# Patient Record
Sex: FEMALE | Race: WHITE | Employment: OTHER | URBAN - METROPOLITAN AREA
[De-identification: names, ages, dates, MRNs, and addresses within clinical notes are randomized per-mention and may not be internally consistent; named-entity substitution may affect disease eponyms.]

---

## 2017-01-17 ENCOUNTER — TELEPHONE (OUTPATIENT)
Dept: FAMILY MEDICINE CLINIC | Facility: CLINIC | Age: 52
End: 2017-01-17

## 2017-01-17 RX ORDER — PREDNISONE 10 MG/1
TABLET ORAL
Qty: 18 TABLET | Refills: 0 | Status: SHIPPED | OUTPATIENT
Start: 2017-01-17 | End: 2017-02-01

## 2017-01-17 RX ORDER — AMOXICILLIN AND CLAVULANATE POTASSIUM 875; 125 MG/1; MG/1
1 TABLET, FILM COATED ORAL 2 TIMES DAILY
Qty: 20 TABLET | Refills: 0 | Status: SHIPPED | OUTPATIENT
Start: 2017-01-17 | End: 2017-01-27

## 2017-01-17 RX ORDER — ALBUTEROL SULFATE 90 UG/1
2 AEROSOL, METERED RESPIRATORY (INHALATION) EVERY 6 HOURS PRN
Qty: 1 INHALER | Refills: 3 | Status: SHIPPED | OUTPATIENT
Start: 2017-01-17 | End: 2017-02-16

## 2017-01-17 RX ORDER — ALBUTEROL SULFATE 2.5 MG/3ML
2.5 SOLUTION RESPIRATORY (INHALATION) EVERY 6 HOURS PRN
Qty: 75 ML | Refills: 12 | Status: SHIPPED | OUTPATIENT
Start: 2017-01-17 | End: 2018-04-23

## 2017-01-17 NOTE — TELEPHONE ENCOUNTER
Pt states she has had her cough since her last OV on 12/20/16. Wheezing started yesterday. Pt has complaints of cough with yellow/green phlegm and nasal congestion. No fevers. Pt states she is out of medication for her nebulizer and her inhaler.  She would

## 2017-01-19 ENCOUNTER — OFFICE VISIT (OUTPATIENT)
Dept: NEUROLOGY | Facility: CLINIC | Age: 52
End: 2017-01-19

## 2017-01-19 ENCOUNTER — TELEPHONE (OUTPATIENT)
Dept: FAMILY MEDICINE CLINIC | Facility: CLINIC | Age: 52
End: 2017-01-19

## 2017-01-19 VITALS
HEART RATE: 80 BPM | WEIGHT: 220 LBS | RESPIRATION RATE: 16 BRPM | DIASTOLIC BLOOD PRESSURE: 88 MMHG | HEIGHT: 68 IN | BODY MASS INDEX: 33.34 KG/M2 | SYSTOLIC BLOOD PRESSURE: 128 MMHG

## 2017-01-19 DIAGNOSIS — M72.2 PLANTAR FASCIITIS: ICD-10-CM

## 2017-01-19 DIAGNOSIS — M79.671 FOOT PAIN, BILATERAL: ICD-10-CM

## 2017-01-19 DIAGNOSIS — M79.672 FOOT PAIN, BILATERAL: ICD-10-CM

## 2017-01-19 PROCEDURE — 99245 OFF/OP CONSLTJ NEW/EST HI 55: CPT | Performed by: OTHER

## 2017-01-19 NOTE — PATIENT INSTRUCTIONS
Refill policies:    • Allow 2 business days for refills; controlled substances may take longer.   • Contact your pharmacy at least 5 days prior to running out of medication and have them send an electronic request or submit request through the “request re your physician has recommended that you have a procedure or additional testing performed. [facility] will contact your insurance carrier to obtain pre-certification or prior authorization.     Unfortunately, MONICA has seen an increas

## 2017-01-19 NOTE — PROGRESS NOTES
[address] [address]   Neurology- INITIAL CLINIC VISIT  2017, 1:29 PM     Davon [address] Convey Patient Status:  No patient class for patient encounter    1965 MRN [id number]   [facility], [address] [place] [address],  (H)   TSH      0.350-5.500 mIU/mL 1.730    T3 FREE      2.30-4.20 pg/mL 3.22        Past Medical History:  Past Medical History   Diagnosis Date   • PCOS (polycystic ovarian syndrome)    • Fibrocystic breast disease    • Obesity, unspecified    • Fibromyal HYDROcodone-acetaminophen  MG Oral Tab, Take 1 tablet by mouth every 4 (four) hours as needed for Pain., Disp: 60 tablet, Rfl: 0  •  naproxen 500 MG Oral Tab, Take 1 tablet (500 mg total) by mouth Q12H.  Take with a meal, Disp: 60 tablet, Rfl: 0  •  m ability to follow commands were intact. Cranial nerves II-XII: Optic discs were sharp. Pupils were round and reacted to light. Extraocular movements were full. There was no face, jaw, palate or tongue weakness or atrophy. Hearing was grossly intact.  Aj Ano neuropathy. However, symptomatically at this time clinical picture is more consistent with musculoskeletal pain.     Check CK, B12, NUPUR, A1c, B6  Wear shoes with heel insert  Exercising off feet, ie in pool  Rest and icing each night  Stretches  rtc in 4 we

## 2017-01-19 NOTE — TELEPHONE ENCOUNTER
Patient is overdue for labs. Will send patient letter via [employer], will postpone for 1 month.

## 2017-01-19 NOTE — PROGRESS NOTES
When bears weight experiences constant shooting/throbbing pain that starts in feet and moves up legs approximately 1 year. Also is \"falling a lot\" over past year also.

## 2017-01-20 ENCOUNTER — TELEPHONE (OUTPATIENT)
Dept: SURGERY | Facility: CLINIC | Age: 52
End: 2017-01-20

## 2017-01-20 ENCOUNTER — OFFICE VISIT (OUTPATIENT)
Dept: SURGERY | Facility: CLINIC | Age: 52
End: 2017-01-20

## 2017-01-20 ENCOUNTER — TELEPHONE (OUTPATIENT)
Dept: FAMILY MEDICINE CLINIC | Facility: CLINIC | Age: 52
End: 2017-01-20

## 2017-01-20 VITALS — RESPIRATION RATE: 14 BRPM | SYSTOLIC BLOOD PRESSURE: 122 MMHG | HEART RATE: 80 BPM | DIASTOLIC BLOOD PRESSURE: 80 MMHG

## 2017-01-20 DIAGNOSIS — M47.817 LUMBOSACRAL SPONDYLOSIS WITHOUT MYELOPATHY: ICD-10-CM

## 2017-01-20 DIAGNOSIS — G89.29 CHRONIC RIGHT SHOULDER PAIN: ICD-10-CM

## 2017-01-20 DIAGNOSIS — M72.2 PLANTAR FASCIITIS: Primary | ICD-10-CM

## 2017-01-20 DIAGNOSIS — M51.26 BULGING LUMBAR DISC: ICD-10-CM

## 2017-01-20 DIAGNOSIS — M25.511 CHRONIC RIGHT SHOULDER PAIN: ICD-10-CM

## 2017-01-20 PROCEDURE — 99213 OFFICE O/P EST LOW 20 MIN: CPT | Performed by: PHYSICIAN ASSISTANT

## 2017-01-20 RX ORDER — HYDROCODONE BITARTRATE AND ACETAMINOPHEN 10; 325 MG/1; MG/1
1 TABLET ORAL EVERY 8 HOURS PRN
Qty: 90 TABLET | Refills: 0 | Status: SHIPPED | OUTPATIENT
Start: 2017-01-20 | End: 2018-04-23

## 2017-01-20 NOTE — TELEPHONE ENCOUNTER
Spoke w/ pt and informed her that at this time we are unable to schedule for procedures with Dr. Jerris Cranker as Dr. Jerris Cranker is currently not credentialed with [facility].  Informed pt process can take at least 30 days and that pt should call back at a later time for

## 2017-01-20 NOTE — PROGRESS NOTES
Pt is here for follow up regarding back pain, pain currently 5/10, mostly on left lower back to hip, occasionally going into left leg

## 2017-01-20 NOTE — TELEPHONE ENCOUNTER
Called and spoke to patient she states she needs letter with all the diagnosis' that she has and what your treating for rehab services \"to get back on her feet\" she states she has an appt with the lady there at 2pm on Monday and she can pick this letter

## 2017-01-20 NOTE — PROGRESS NOTES
Neurosurgery Follow up      Satya Brandt 113 Samir James is a 48year old right-handed female follow-up. She continues to have low back pain on extension. She continues with right shoulder pain.   She has bilateral foot pain which she BIOPSY          GREGORIA NEEDLE LOCALIZATION W/ SPECIMEN 1 SITE LEFT    1979      Comment  BENIGN      NEEDLE BIOPSY LEFT    2/2010      Comment  BENIGN      OTHER          Comment  left hand, injury        Bilateral  6/9/2016      Comment  Procedure: SYNVISC I dry, without rashes. NEUROLOGICAL:  This patient is alert and orientated x 3.  Speech fluent. Comprehension intact.  Pupils equally round and reactive to light.  3+ brisk bilaterally.  EOMs intact.  Visual fields are full.  Face is symmetrical. Tongue is stenosis    ASSESSMENT:  1.  Balance dysfunction  2.  Frequent falls  3. Cervical spondylosis  4.  Thoracic scoliosis  5.  L5-S1 spondylosis with back pain  6.  Bilateral hip pain  7. Right shoulder pain  8. Plantar fasciitis    PLAN:  1.  Follow up with

## 2017-01-23 ENCOUNTER — TELEPHONE (OUTPATIENT)
Dept: FAMILY MEDICINE CLINIC | Facility: CLINIC | Age: 52
End: 2017-01-23

## 2017-01-23 NOTE — TELEPHONE ENCOUNTER
----- Message from Trish Dai sent at 1/23/2017 12:03 PM CST -----  Contact: Pt  Pt calling to see if ppwk is ready for her. Pt advised she will also need a list of all meds.  Pls call pt @ [phone]

## 2017-01-23 NOTE — TELEPHONE ENCOUNTER
Clarification: 1-2 weeks after finishing antibiotic. Pt notified by phone and verbalized understanding.

## 2017-01-23 NOTE — TELEPHONE ENCOUNTER
Pt states the program helps individuals figure how to go back to school or work after having certain conditions.  She believes that fibromyalgia is an accepted condition but has to bring a letter with all her medical conditions so that she can be accepted i

## 2017-02-01 ENCOUNTER — HOSPITAL ENCOUNTER (OUTPATIENT)
Age: 52
Discharge: HOME | End: 2017-02-01
Attending: FAMILY MEDICINE
Payer: MEDICAID

## 2017-02-01 ENCOUNTER — MED REC SCAN ONLY (OUTPATIENT)
Dept: FAMILY MEDICINE CLINIC | Facility: CLINIC | Age: 52
End: 2017-02-01

## 2017-02-01 ENCOUNTER — APPOINTMENT (OUTPATIENT)
Dept: GENERAL RADIOLOGY | Age: 52
End: 2017-02-01
Attending: FAMILY MEDICINE
Payer: MEDICAID

## 2017-02-01 VITALS
HEART RATE: 85 BPM | DIASTOLIC BLOOD PRESSURE: 81 MMHG | TEMPERATURE: 98 F | WEIGHT: 220 LBS | SYSTOLIC BLOOD PRESSURE: 120 MMHG | RESPIRATION RATE: 16 BRPM | BODY MASS INDEX: 33.34 KG/M2 | HEIGHT: 68 IN

## 2017-02-01 DIAGNOSIS — S93.402A MODERATE ANKLE SPRAIN, LEFT, INITIAL ENCOUNTER: Primary | ICD-10-CM

## 2017-02-01 PROCEDURE — 99203 OFFICE O/P NEW LOW 30 MIN: CPT

## 2017-02-01 PROCEDURE — 73630 X-RAY EXAM OF FOOT: CPT

## 2017-02-01 PROCEDURE — 99213 OFFICE O/P EST LOW 20 MIN: CPT

## 2017-02-01 PROCEDURE — 73610 X-RAY EXAM OF ANKLE: CPT

## 2017-02-01 NOTE — ED INITIAL ASSESSMENT (HPI)
The patient is here with complaints of left foot and ankle pain following a fall down a stair last night about 10pm.  She has swelling to the left ankle and limps with walking due to the pain.   She hasn't taken anything for pain at this time but has used i

## 2017-02-01 NOTE — ED PROVIDER NOTES
Patient presents with:  Lower Extremity Injury (musculoskeletal)      HPI:     Olga Bennett is a 46year old female who presents today with a chief complaint of pain in the left foot and ankle  pain after fall and twisting injury.  Onset of symptom EYES: sclera non icteric bilateral  NECK: supple, no adenopathy,  LUNGS: clear to auscultation, no RRW  CARDIO: RRR without murmur        MDM/Assessment/Plan:   Orders for this encounter:      Orders Placed This Encounter  XR FOOT, COMPLETE (MIN 3 VIEWS) CONCLUSION:  No evidence of acute displaced fracture or dislocation in the left ankle. Mild soft tissue swelling surrounding the ankle joint. Dictated by: Marianne Arenas MD on 2/01/2017 at 10:29     Approved by:  Marianne Arenas MD            Xr Foot, Comp

## 2017-02-03 ENCOUNTER — APPOINTMENT (OUTPATIENT)
Dept: LAB | Facility: HOSPITAL | Age: 52
End: 2017-02-03
Attending: FAMILY MEDICINE
Payer: MEDICAID

## 2017-02-03 DIAGNOSIS — M79.672 FOOT PAIN, BILATERAL: ICD-10-CM

## 2017-02-03 DIAGNOSIS — M79.671 FOOT PAIN, BILATERAL: ICD-10-CM

## 2017-02-03 DIAGNOSIS — N91.2 AMENORRHEA: ICD-10-CM

## 2017-02-03 DIAGNOSIS — G58.9 MONONEUROPATHY: ICD-10-CM

## 2017-02-03 DIAGNOSIS — E06.3 HASHIMOTO'S THYROIDITIS: ICD-10-CM

## 2017-02-03 DIAGNOSIS — E01.0 THYROMEGALY: ICD-10-CM

## 2017-02-03 LAB
ANTI-THYROPEROXIDASE: 100 U/ML (ref ?–60)
EST. AVERAGE GLUCOSE BLD GHB EST-MCNC: 120 MG/DL (ref 68–126)
FREE T4: 1.2 NG/DL (ref 0.9–1.8)
HAV AB SERPL IA-ACNC: 1036 PG/ML (ref 193–986)
HBA1C MFR BLD HPLC: 5.8 % (ref ?–5.7)
T3FREE SERPL-MCNC: 3.28 PG/ML (ref 2.3–4.2)
TSI SER-ACNC: 1.29 MIU/ML (ref 0.35–5.5)

## 2017-02-03 PROCEDURE — 84481 FREE ASSAY (FT-3): CPT

## 2017-02-03 PROCEDURE — 84443 ASSAY THYROID STIM HORMONE: CPT

## 2017-02-03 PROCEDURE — 83036 HEMOGLOBIN GLYCOSYLATED A1C: CPT

## 2017-02-03 PROCEDURE — 86038 ANTINUCLEAR ANTIBODIES: CPT

## 2017-02-03 PROCEDURE — 82552 ASSAY OF CPK IN BLOOD: CPT

## 2017-02-03 PROCEDURE — 86376 MICROSOMAL ANTIBODY EACH: CPT

## 2017-02-03 PROCEDURE — 84207 ASSAY OF VITAMIN B-6: CPT

## 2017-02-03 PROCEDURE — 36415 COLL VENOUS BLD VENIPUNCTURE: CPT

## 2017-02-03 PROCEDURE — 82550 ASSAY OF CK (CPK): CPT

## 2017-02-03 PROCEDURE — 84439 ASSAY OF FREE THYROXINE: CPT

## 2017-02-03 PROCEDURE — 82607 VITAMIN B-12: CPT

## 2017-02-05 LAB
ANA SCREEN: NEGATIVE
VITAMIN B6: 157.5 NMOL/L

## 2017-02-06 ENCOUNTER — TELEPHONE (OUTPATIENT)
Dept: NEUROLOGY | Facility: CLINIC | Age: 52
End: 2017-02-06

## 2017-02-06 ENCOUNTER — TELEPHONE (OUTPATIENT)
Dept: FAMILY MEDICINE CLINIC | Facility: CLINIC | Age: 52
End: 2017-02-06

## 2017-02-06 DIAGNOSIS — E01.0 THYROMEGALY: Primary | ICD-10-CM

## 2017-02-06 LAB
CK TOTAL: 79 U/L
CK-BB: 0 %
CK-MACRO TYPE I: 0 %
CK-MACRO TYPE II: 0 %
CK-MB: 0 %
CK-MM: 100 %

## 2017-02-06 NOTE — TELEPHONE ENCOUNTER
----- Message from Mallory Moffett DO sent at 2/6/2017  1:46 PM CST -----  Can notify Merlyn Corner her labs are looking better, lets keep the thyroid dose the same and recall TSH and free T4 in 1 month

## 2017-02-06 NOTE — TELEPHONE ENCOUNTER
Patient advised. Orders placed.   She will have labs drawn at another [facility]

## 2017-02-06 NOTE — TELEPHONE ENCOUNTER
TPO is an antibody level, so her thyroid is being attacked but the antibodies and as that process resolves her number go down, but her thyroid is slowly burning itself out, Sometimes she may regain thyroid function, but for now the levothyroxine is helping

## 2017-02-06 NOTE — TELEPHONE ENCOUNTER
Patient advised of lab results.   She's not understanding the reason why her TSH and other levels were normal and her TPO was elevated

## 2017-02-06 NOTE — TELEPHONE ENCOUNTER
----- Message from Matilda Brandt DO sent at 2/6/2017  9:51 AM CST -----  Vitamin B6 level was elevated. Would stop taking B vitamins at this time as her B12 was >1000k and B6 toxicity can cause a painful neuropathy.   This may be only contributing but

## 2017-02-13 RX ORDER — LEVOTHYROXINE SODIUM 0.03 MG/1
TABLET ORAL
Qty: 30 TABLET | Refills: 0 | Status: SHIPPED | OUTPATIENT
Start: 2017-02-13 | End: 2017-02-13

## 2017-02-13 RX ORDER — LEVOTHYROXINE SODIUM 0.03 MG/1
TABLET ORAL
Qty: 30 TABLET | Refills: 1 | Status: SHIPPED | OUTPATIENT
Start: 2017-02-13 | End: 2017-05-13

## 2017-02-13 NOTE — TELEPHONE ENCOUNTER
Last OV 12/20/16, Future Appointments  Date Time Provider Karly Recinos   2/23/2017 2:00 PM Jeanmarie Jackson, [address] [facility] [place]   3/3/2017 8:30 AM MD HERNANDO BhaktaPER2 [facility] [place]       Last rx given 12/20/16  Last TSH done 2/3/17 wa

## 2017-03-02 ENCOUNTER — TELEPHONE (OUTPATIENT)
Dept: SURGERY | Facility: CLINIC | Age: 52
End: 2017-03-02

## 2017-03-06 ENCOUNTER — TELEPHONE (OUTPATIENT)
Dept: NEUROLOGY | Facility: CLINIC | Age: 52
End: 2017-03-06

## 2017-03-06 DIAGNOSIS — M47.816 LUMBAR FACET ARTHROPATHY: Primary | ICD-10-CM

## 2017-03-06 DIAGNOSIS — M25.519 SHOULDER PAIN, UNSPECIFIED CHRONICITY, UNSPECIFIED LATERALITY: ICD-10-CM

## 2017-03-06 NOTE — TELEPHONE ENCOUNTER
Spoke with pt who would like to scheduled injections, informed her that prior authorization will be started. Pt verbalized understanding, no further needs.      Patient has [employer], prior authorization is needed for    Right shoulder joint injection  Lef

## 2017-03-07 ENCOUNTER — OFFICE VISIT (OUTPATIENT)
Dept: NEUROLOGY | Facility: CLINIC | Age: 52
End: 2017-03-07

## 2017-03-07 VITALS
SYSTOLIC BLOOD PRESSURE: 122 MMHG | DIASTOLIC BLOOD PRESSURE: 76 MMHG | BODY MASS INDEX: 34 KG/M2 | HEART RATE: 72 BPM | WEIGHT: 222 LBS | RESPIRATION RATE: 16 BRPM

## 2017-03-07 DIAGNOSIS — M72.2 PLANTAR FASCIITIS: Primary | ICD-10-CM

## 2017-03-07 PROCEDURE — 99214 OFFICE O/P EST MOD 30 MIN: CPT | Performed by: OTHER

## 2017-03-07 NOTE — PROGRESS NOTES
[facility] in [place]  Neurology - Clinic Follow up  3/7/2017, 8:40 AM     [facility] Patient Status:  No patient class for patient encounter    1965 MRN [id number]   Location [unfilled] PCP DO Ren Raphael last encounter    ALLERGIES:   Terbinafine Hcl             MEDICATIONS: EMR reviewed   Current outpatient prescriptions:   •  Levothyroxine Sodium 25 MCG Oral Tab, TAKE 1 TABLET(25 MCG) BY MOUTH BEFORE BREAKFAST, Disp: 30 tablet, Rfl: 1  •  HYDROcodone-ace in no acute distress,   Pink Conjunctiva;   Neck: supple, no bruits;  Cardiac: S1/S2 RRR  Lungs: CTA B/L;  Musculoskeletal: no joint tenderness, others see neuro exam;  Extremities:  no pitting edema, no cyanosis or skin rash,  pulse is present,  Neurologic patient was given ample opportunity to ask questions. All questions and concerns were addressed.      Yasmani Hogan,   Neurology and Neuromuscular medicine  [facility]

## 2017-03-07 NOTE — PATIENT INSTRUCTIONS
Refill policies:    • Allow 2 business days for refills; controlled substances may take longer.   • Contact your pharmacy at least 5 days prior to running out of medication and have them send an electronic request or submit request through the “request re your physician has recommended that you have a procedure or additional testing performed. [facility] will contact your insurance carrier to obtain pre-certification or prior authorization.     Unfortunately, MONICA has seen an increas

## 2017-03-13 NOTE — TELEPHONE ENCOUNTER
Received a fax from [address] [place] [address], [phone] has been denied    The clinical information provided was reviewed by our medial director and the request for Facet Joint Interventions are denied based on lack of medical necessity.  Facet joint medial [facility]

## 2017-03-15 NOTE — TELEPHONE ENCOUNTER
Spoke with patient and informed patient of message below. Informed pt PT referral being mailed to pt's home. Address confirmed. Pt verbalized understanding. No further questions at this time.

## 2017-03-15 NOTE — TELEPHONE ENCOUNTER
PT order entered, she needs to do it for 6 weeks, I ordered 3x per week. She should follow up with us in the office afterward so we can document her progress and see if the insurance will approve injections, if needed, at that point.

## 2017-03-21 ENCOUNTER — APPOINTMENT (OUTPATIENT)
Dept: LAB | Facility: HOSPITAL | Age: 52
End: 2017-03-21
Attending: FAMILY MEDICINE
Payer: MEDICAID

## 2017-03-21 ENCOUNTER — TELEPHONE (OUTPATIENT)
Dept: FAMILY MEDICINE CLINIC | Facility: CLINIC | Age: 52
End: 2017-03-21

## 2017-03-21 DIAGNOSIS — E01.0 THYROMEGALY: Primary | ICD-10-CM

## 2017-03-21 DIAGNOSIS — E01.0 THYROMEGALY: ICD-10-CM

## 2017-03-21 LAB
FREE T4: 1.3 NG/DL (ref 0.9–1.8)
TSI SER-ACNC: 1.27 MIU/ML (ref 0.35–5.5)

## 2017-03-21 PROCEDURE — 84443 ASSAY THYROID STIM HORMONE: CPT

## 2017-03-21 PROCEDURE — 84439 ASSAY OF FREE THYROXINE: CPT

## 2017-03-21 PROCEDURE — 36415 COLL VENOUS BLD VENIPUNCTURE: CPT

## 2017-03-21 NOTE — TELEPHONE ENCOUNTER
Her thyroid is normal in function now don;t follow antibody levels we follow her progress  And her TSh, if she'd like she could see if she can find an endocrinologists, with Jett Valdes or [facility]?

## 2017-03-21 NOTE — TELEPHONE ENCOUNTER
----- Message from Agnieszka Saenz DO sent at 3/21/2017  3:02 PM CDT -----  Can notify her Thyroid looks fine, lets keep the dose the same and recall in 6 months THS nand free T4

## 2017-03-21 NOTE — TELEPHONE ENCOUNTER
Patient notified of below. [facility] [phone]. Will call our office to let us know if we can fax info to them.

## 2017-03-21 NOTE — TELEPHONE ENCOUNTER
Left message for patient to call office back. Office phone number provided. Also confirmed that [facility] accepts Medicaid. Their phone # is [phone]. Fax # [phone]  Address [address] [place] [address]

## 2017-03-21 NOTE — TELEPHONE ENCOUNTER
Patient notified of below. Wants to know why she is so tired. Wanted antibody levels checked as they were high last time. Why did TSH and T4 values fluctuate? States is frustrated-hasn't slept in 24 hours. Please advise.

## 2017-03-28 NOTE — TELEPHONE ENCOUNTER
Mail came back with \"Insufficient address\"; called and re-verified address with patient. Address was missing type of street (had [address], should read [address]).  Re-mailed to patient on 03/28 with corrected address, updated pt's demographics to

## 2017-05-15 RX ORDER — LEVOTHYROXINE SODIUM 0.03 MG/1
TABLET ORAL
Qty: 30 TABLET | Refills: 4 | Status: SHIPPED | OUTPATIENT
Start: 2017-05-15 | End: 2017-05-23

## 2017-05-15 NOTE — TELEPHONE ENCOUNTER
Last OV 12/20/16  Last labs 3/21/17  Free T4 0.9-1.8 ng/dL 1.3     TSH 0.350-5.500 mIU/mL 1.270            Last refilled 2/13/17  #30  1 refill

## 2017-05-22 ENCOUNTER — OFFICE VISIT (OUTPATIENT)
Dept: FAMILY MEDICINE CLINIC | Facility: CLINIC | Age: 52
End: 2017-05-22

## 2017-05-22 VITALS
DIASTOLIC BLOOD PRESSURE: 72 MMHG | TEMPERATURE: 99 F | HEART RATE: 96 BPM | SYSTOLIC BLOOD PRESSURE: 110 MMHG | RESPIRATION RATE: 16 BRPM | WEIGHT: 226 LBS | BODY MASS INDEX: 34 KG/M2

## 2017-05-22 DIAGNOSIS — E06.3 HASHIMOTO'S DISEASE: ICD-10-CM

## 2017-05-22 DIAGNOSIS — N91.2 AMENORRHEA: ICD-10-CM

## 2017-05-22 DIAGNOSIS — M79.7 FIBROMYALGIA: ICD-10-CM

## 2017-05-22 DIAGNOSIS — Z11.4 SCREENING FOR HIV (HUMAN IMMUNODEFICIENCY VIRUS): ICD-10-CM

## 2017-05-22 DIAGNOSIS — E04.1 THYROID NODULE: ICD-10-CM

## 2017-05-22 DIAGNOSIS — E01.0 THYROMEGALY: Primary | ICD-10-CM

## 2017-05-22 PROCEDURE — 99214 OFFICE O/P EST MOD 30 MIN: CPT | Performed by: FAMILY MEDICINE

## 2017-05-22 PROCEDURE — 36415 COLL VENOUS BLD VENIPUNCTURE: CPT | Performed by: FAMILY MEDICINE

## 2017-05-22 PROCEDURE — 84481 FREE ASSAY (FT-3): CPT | Performed by: FAMILY MEDICINE

## 2017-05-22 PROCEDURE — 86376 MICROSOMAL ANTIBODY EACH: CPT | Performed by: FAMILY MEDICINE

## 2017-05-22 PROCEDURE — 87389 HIV-1 AG W/HIV-1&-2 AB AG IA: CPT | Performed by: FAMILY MEDICINE

## 2017-05-22 PROCEDURE — 84443 ASSAY THYROID STIM HORMONE: CPT | Performed by: FAMILY MEDICINE

## 2017-05-23 ENCOUNTER — TELEPHONE (OUTPATIENT)
Dept: FAMILY MEDICINE CLINIC | Facility: CLINIC | Age: 52
End: 2017-05-23

## 2017-05-23 DIAGNOSIS — E01.0 THYROMEGALY: Primary | ICD-10-CM

## 2017-05-23 RX ORDER — LEVOTHYROXINE SODIUM 0.07 MG/1
75 TABLET ORAL
Qty: 30 TABLET | Refills: 1 | Status: SHIPPED | OUTPATIENT
Start: 2017-05-23 | End: 2017-07-28

## 2017-05-23 NOTE — TELEPHONE ENCOUNTER
----- Message from Elenita Gramajo DO sent at 5/23/2017  4:50 PM CDT -----  Can notify it is negative

## 2017-05-23 NOTE — TELEPHONE ENCOUNTER
Pt notified by phone and verbalized understanding. Reflex order and pt reminder entered into [employer]. Rx faxed to [facility] in [place].

## 2017-05-23 NOTE — TELEPHONE ENCOUNTER
----- Message from Shirley Del Cid DO sent at 5/23/2017 12:14 PM CDT -----  Can notify Collette Brittonsper that her Thyroid is normal but at the lower end of normal, so lets increase her levothyroxine to 75 mcg and see if that helps, and would get a repeat TSh and T4

## 2017-05-24 ENCOUNTER — TELEPHONE (OUTPATIENT)
Dept: FAMILY MEDICINE CLINIC | Facility: CLINIC | Age: 52
End: 2017-05-24

## 2017-05-24 DIAGNOSIS — Z00.00 ROUTINE HEALTH MAINTENANCE: Primary | ICD-10-CM

## 2017-05-24 NOTE — TELEPHONE ENCOUNTER
Detailed message left for pt on cell (ok per consent) with instructions to call to schedule mammogram.

## 2017-05-25 ENCOUNTER — TELEPHONE (OUTPATIENT)
Dept: FAMILY MEDICINE CLINIC | Facility: CLINIC | Age: 52
End: 2017-05-25

## 2017-05-25 ENCOUNTER — HOSPITAL ENCOUNTER (OUTPATIENT)
Dept: ULTRASOUND IMAGING | Age: 52
Discharge: HOME | End: 2017-05-25
Attending: FAMILY MEDICINE
Payer: MEDICAID

## 2017-05-25 DIAGNOSIS — E04.1 THYROID NODULE: ICD-10-CM

## 2017-05-25 DIAGNOSIS — M79.7 FIBROMYALGIA: ICD-10-CM

## 2017-05-25 DIAGNOSIS — N91.2 AMENORRHEA: ICD-10-CM

## 2017-05-25 DIAGNOSIS — E01.0 THYROMEGALY: ICD-10-CM

## 2017-05-25 DIAGNOSIS — E06.3 HASHIMOTO'S DISEASE: ICD-10-CM

## 2017-05-25 PROCEDURE — 76536 US EXAM OF HEAD AND NECK: CPT | Performed by: FAMILY MEDICINE

## 2017-05-25 NOTE — TELEPHONE ENCOUNTER
Detailed message left for pt on cell (ok per consent) with instructions to call with questions/concerns. Info for Dr. Adolph Urbano and Dr. Jie Chapman left on voice mail.

## 2017-05-25 NOTE — TELEPHONE ENCOUNTER
----- Message from Clark Purdy DO sent at 5/25/2017  2:00 PM CDT -----  Can notify [facility] Bring she has multiple small nodules in her thyroid, if she has [facility] she can continue to see our Docs, so have her set up an appt with Endo, for further evaluati

## 2017-05-30 ENCOUNTER — APPOINTMENT (OUTPATIENT)
Dept: GENERAL RADIOLOGY | Age: 52
End: 2017-05-30
Attending: FAMILY MEDICINE
Payer: MEDICAID

## 2017-05-30 ENCOUNTER — HOSPITAL ENCOUNTER (OUTPATIENT)
Age: 52
Discharge: HOME | End: 2017-05-30
Attending: FAMILY MEDICINE
Payer: MEDICAID

## 2017-05-30 VITALS
SYSTOLIC BLOOD PRESSURE: 131 MMHG | TEMPERATURE: 98 F | RESPIRATION RATE: 16 BRPM | OXYGEN SATURATION: 97 % | BODY MASS INDEX: 33.8 KG/M2 | WEIGHT: 223 LBS | HEIGHT: 68 IN | HEART RATE: 88 BPM | DIASTOLIC BLOOD PRESSURE: 85 MMHG

## 2017-05-30 DIAGNOSIS — S80.01XA CONTUSION OF RIGHT KNEE, INITIAL ENCOUNTER: Primary | ICD-10-CM

## 2017-05-30 PROCEDURE — 99213 OFFICE O/P EST LOW 20 MIN: CPT

## 2017-05-30 PROCEDURE — 73560 X-RAY EXAM OF KNEE 1 OR 2: CPT | Performed by: FAMILY MEDICINE

## 2017-05-30 RX ORDER — NAPROXEN 500 MG/1
500 TABLET ORAL 2 TIMES DAILY PRN
Qty: 28 TABLET | Refills: 0 | Status: SHIPPED | OUTPATIENT
Start: 2017-05-30 | End: 2018-02-02

## 2017-05-31 ENCOUNTER — HOSPITAL ENCOUNTER (OUTPATIENT)
Dept: MAMMOGRAPHY | Age: 52
Discharge: HOME | End: 2017-05-31
Attending: FAMILY MEDICINE
Payer: MEDICAID

## 2017-05-31 DIAGNOSIS — Z00.00 ROUTINE HEALTH MAINTENANCE: ICD-10-CM

## 2017-05-31 PROCEDURE — 77067 SCR MAMMO BI INCL CAD: CPT | Performed by: FAMILY MEDICINE

## 2017-05-31 NOTE — ED PROVIDER NOTES
Patient Seen in: [facility]    History   Patient presents with:  Fall (musculoskeletal, neurologic)  Lower Extremity Injury (musculoskeletal)  Upper Extremity Injury (musculoskeletal)    Stated Complaint: fall, rt side of b nebulization every 6 (six) hours as needed for Wheezing. methocarbamol 500 MG Oral Tab,  Take 1 tablet (500 mg total) by mouth every 8 (eight) hours as needed (for muscle spasm). lidocaine 5 % External Patch,  Place 1 patch onto the skin daily.        Arthor Bernheim examination there is some localized bruising over the lateral part of the thigh without any focal tenderness. Full range of motion of the hip. Patient is able to bear weight on the affected extremity.     ED Course   Labs Reviewed - No data to display  Xr

## 2017-06-19 ENCOUNTER — TELEPHONE (OUTPATIENT)
Dept: SURGERY | Facility: CLINIC | Age: 52
End: 2017-06-19

## 2017-06-19 ENCOUNTER — HOSPITAL ENCOUNTER (EMERGENCY)
Age: 52
Discharge: HOME | End: 2017-06-19
Attending: EMERGENCY MEDICINE
Payer: MEDICAID

## 2017-06-19 ENCOUNTER — APPOINTMENT (OUTPATIENT)
Dept: ULTRASOUND IMAGING | Age: 52
End: 2017-06-19
Attending: EMERGENCY MEDICINE
Payer: MEDICAID

## 2017-06-19 VITALS
BODY MASS INDEX: 32.28 KG/M2 | TEMPERATURE: 98 F | SYSTOLIC BLOOD PRESSURE: 115 MMHG | HEART RATE: 78 BPM | WEIGHT: 213 LBS | DIASTOLIC BLOOD PRESSURE: 68 MMHG | RESPIRATION RATE: 18 BRPM | OXYGEN SATURATION: 99 % | HEIGHT: 68 IN

## 2017-06-19 DIAGNOSIS — R60.9 PERIPHERAL EDEMA: Primary | ICD-10-CM

## 2017-06-19 DIAGNOSIS — M54.31 SCIATICA OF RIGHT SIDE: ICD-10-CM

## 2017-06-19 PROCEDURE — 99284 EMERGENCY DEPT VISIT MOD MDM: CPT

## 2017-06-19 PROCEDURE — 93971 EXTREMITY STUDY: CPT | Performed by: EMERGENCY MEDICINE

## 2017-06-19 RX ORDER — HYDROCODONE BITARTRATE AND ACETAMINOPHEN 5; 325 MG/1; MG/1
1 TABLET ORAL ONCE
Status: COMPLETED | OUTPATIENT
Start: 2017-06-19 | End: 2017-06-19

## 2017-06-19 RX ORDER — HYDROCODONE BITARTRATE AND ACETAMINOPHEN 5; 325 MG/1; MG/1
1-2 TABLET ORAL EVERY 4 HOURS PRN
Qty: 20 TABLET | Refills: 0 | Status: SHIPPED | OUTPATIENT
Start: 2017-06-19 | End: 2017-06-26

## 2017-06-19 RX ORDER — IBUPROFEN 600 MG/1
600 TABLET ORAL ONCE
Status: COMPLETED | OUTPATIENT
Start: 2017-06-19 | End: 2017-06-19

## 2017-06-19 NOTE — ED INITIAL ASSESSMENT (HPI)
RIGHT LEG PAIN FOR PAST MONTH BUT SWELLING OF CALF STARTED ON SAT.   TOOK 2 [address] WITHOUT RELIEF

## 2017-06-19 NOTE — TELEPHONE ENCOUNTER
Pt calling regarding PT order. Pt PT order for evaluation and treatment of low back not completed and now . Pt states she has new pain to leg and foot and scheduling OV for f/u to new pain.   Pt states she will request new PT order to include new ar

## 2017-07-12 ENCOUNTER — OFFICE VISIT (OUTPATIENT)
Dept: SURGERY | Facility: CLINIC | Age: 52
End: 2017-07-12

## 2017-07-12 VITALS
BODY MASS INDEX: 33.04 KG/M2 | DIASTOLIC BLOOD PRESSURE: 76 MMHG | SYSTOLIC BLOOD PRESSURE: 132 MMHG | RESPIRATION RATE: 16 BRPM | HEART RATE: 78 BPM | HEIGHT: 68 IN | WEIGHT: 218 LBS

## 2017-07-12 DIAGNOSIS — M54.16 LUMBAR RADICULITIS: Primary | ICD-10-CM

## 2017-07-12 DIAGNOSIS — M72.2 PLANTAR FASCIITIS: ICD-10-CM

## 2017-07-12 DIAGNOSIS — G89.29 CHRONIC PAIN OF RIGHT KNEE: ICD-10-CM

## 2017-07-12 DIAGNOSIS — M25.561 CHRONIC PAIN OF RIGHT KNEE: ICD-10-CM

## 2017-07-12 PROCEDURE — 99214 OFFICE O/P EST MOD 30 MIN: CPT | Performed by: PHYSICIAN ASSISTANT

## 2017-07-12 NOTE — PATIENT INSTRUCTIONS
Refill policies:    • Allow 2-3 business days for refills; controlled substances may take longer.   • Contact your pharmacy at least 5 days prior to running out of medication and have them send an electronic request or submit request through the [facility] have a procedure or additional testing performed. [facility]) will contact your insurance carrier to obtain pre-certification or prior authorization.     Unfortunately, MONICA has seen an increase in denial of payment even though the p

## 2017-07-12 NOTE — PROGRESS NOTES
Name: Merari Alvarez   : 1965   DOS: 2017     Pain Clinic Follow Up Visit:   Patient presents with:   Follow - Up: shoulder/back/leg pain      Sienna Villatoro Sat is a 46year old female who presents for recheck of chronic low back pa needed. Disp: 90 tablet Rfl: 0   methocarbamol 500 MG Oral Tab Take 1 tablet (500 mg total) by mouth every 8 (eight) hours as needed (for muscle spasm). Disp: 90 tablet Rfl: 0   lidocaine 5 % External Patch Place 1 patch onto the skin daily.  Disp: 30 patch AM

## 2017-07-14 ENCOUNTER — TELEPHONE (OUTPATIENT)
Dept: SURGERY | Facility: CLINIC | Age: 52
End: 2017-07-14

## 2017-07-14 NOTE — TELEPHONE ENCOUNTER
Spoke w/ pt and informed of message below. Pt to call office to provide update after 6 weeks of PT. Pt verbalized understanding. No further needs at this time.       ASSESSMENT AND PLAN:   Lumbar radiculitis  (primary encounter diagnosis)  Chronic pain o

## 2017-07-17 ENCOUNTER — TELEPHONE (OUTPATIENT)
Dept: NEUROLOGY | Facility: CLINIC | Age: 52
End: 2017-07-17

## 2017-07-17 NOTE — TELEPHONE ENCOUNTER
Spoke with patient and informed patient of message below. Pt states she is trying to get into PT and states she has tried medication. Pt verbalized frustration and understanding and states she will call insurance to follow up.   No further questions at [facility]

## 2017-07-17 NOTE — TELEPHONE ENCOUNTER
Received a fax from [facility], MRI Knee/MRI Lumbar has been denied,    An MRI Knee must meet TONY guidelines which require that imaging be necessary on the basis of a medical or physical condition.  Based on information provided (a fall on the knee), the recommend

## 2017-07-24 ENCOUNTER — HOSPITAL ENCOUNTER (OUTPATIENT)
Dept: PHYSICAL THERAPY | Facility: HOSPITAL | Age: 52
Setting detail: THERAPIES SERIES
Discharge: HOME | End: 2017-07-24
Attending: PHYSICIAN ASSISTANT
Payer: COMMERCIAL

## 2017-07-24 ENCOUNTER — TELEPHONE (OUTPATIENT)
Dept: FAMILY MEDICINE CLINIC | Facility: CLINIC | Age: 52
End: 2017-07-24

## 2017-07-24 DIAGNOSIS — M54.16 LUMBAR RADICULITIS: ICD-10-CM

## 2017-07-24 PROCEDURE — 97163 PT EVAL HIGH COMPLEX 45 MIN: CPT

## 2017-07-24 PROCEDURE — 97110 THERAPEUTIC EXERCISES: CPT

## 2017-07-24 NOTE — TELEPHONE ENCOUNTER
Patient is due for repeat labs. Letter mailed to patients home address, as patient does not check mychart account.

## 2017-07-24 NOTE — PROGRESS NOTES
SPINE EVALUATION:   Referring Physician: Dr. Tenna Lombard  Diagnosis: lumbar radiculitis     Date of Service: 7/24/2017     PATIENT SUMMARY   Sienna Gray is a 46year old y/o female who presents to therapy today with complaints of RLE swelling and karyn thyroid problem. ASSESSMENT  Patient presents to PT with impairments of pain, decreased lumbar ROM, decreased strength, gait deviations, and loss of function.   Patient has widespread pain in RLE that is affected with activity and can move around, though pain with going up a flight of stairs to her bedroom. Patient will demonstrate at least 75% reduction in sleep disturbances.   Patient will increase lumbar extension ROM to at least 55 cm in order to improve her ability to perform sit to stand transfer wit

## 2017-07-27 ENCOUNTER — HOSPITAL ENCOUNTER (OUTPATIENT)
Dept: PHYSICAL THERAPY | Facility: HOSPITAL | Age: 52
Setting detail: THERAPIES SERIES
Discharge: HOME | End: 2017-07-27
Attending: PHYSICIAN ASSISTANT
Payer: COMMERCIAL

## 2017-07-27 PROCEDURE — 97110 THERAPEUTIC EXERCISES: CPT

## 2017-07-27 PROCEDURE — 97112 NEUROMUSCULAR REEDUCATION: CPT

## 2017-07-27 PROCEDURE — 97140 MANUAL THERAPY 1/> REGIONS: CPT

## 2017-07-27 NOTE — PROGRESS NOTES
Dx: lumbar radiculitis         Authorized # of Visits:           Next MD visit: none scheduled  Fall Risk: standard         Precautions: n/a             Subjective: Patient reports no change at this time.     Objective:   RLE swelling and pain      Assessme x1, neuro x1       Total Timed Treatment: 45 min  Total Treatment Time: 45 min

## 2017-07-28 DIAGNOSIS — E01.0 THYROMEGALY: ICD-10-CM

## 2017-07-28 RX ORDER — LEVOTHYROXINE SODIUM 0.07 MG/1
TABLET ORAL
Qty: 30 TABLET | Refills: 6 | Status: SHIPPED | OUTPATIENT
Start: 2017-07-28 | End: 2018-05-30 | Stop reason: ALTCHOICE

## 2017-07-31 ENCOUNTER — HOSPITAL ENCOUNTER (OUTPATIENT)
Dept: PHYSICAL THERAPY | Facility: HOSPITAL | Age: 52
Setting detail: THERAPIES SERIES
Discharge: HOME | End: 2017-07-31
Attending: PHYSICIAN ASSISTANT
Payer: COMMERCIAL

## 2017-07-31 PROCEDURE — 97140 MANUAL THERAPY 1/> REGIONS: CPT

## 2017-07-31 PROCEDURE — 97112 NEUROMUSCULAR REEDUCATION: CPT

## 2017-07-31 PROCEDURE — 97110 THERAPEUTIC EXERCISES: CPT

## 2017-07-31 NOTE — PROGRESS NOTES
Dx: lumbar radiculitis         Authorized # of Visits:           Next MD visit: none scheduled  Fall Risk: standard         Precautions: n/a             Subjective: Patient reports no change at this time.     Objective:   RLE swelling and pain    Assessment alarm system x10' total PNE: education regarding sleep  Hygiene, alarm system x10' total         8\" forward lunges x20 BL        Skilled Services: HEP in bold.     Charges: manual x1, therex x1, neuro x1       Total Timed Treatment: 45 min  Total Treatment

## 2017-08-03 ENCOUNTER — HOSPITAL ENCOUNTER (OUTPATIENT)
Dept: PHYSICAL THERAPY | Facility: HOSPITAL | Age: 52
Setting detail: THERAPIES SERIES
Discharge: HOME | End: 2017-08-03
Attending: PHYSICIAN ASSISTANT
Payer: COMMERCIAL

## 2017-08-03 PROCEDURE — 97110 THERAPEUTIC EXERCISES: CPT

## 2017-08-03 NOTE — PROGRESS NOTES
Dx: lumbar radiculitis         Authorized # of Visits:           Next MD visit: none scheduled  Fall Risk: standard         Precautions: n/a             Subjective: Patient reports her heels are killing her today.     Objective:   RLE swelling and pain    A regarding sleep tips, sensitive nervous system, alarm system x10' total PNE: education regarding sleep  Hygiene, alarm system x10' total         8\" forward lunges x20 BL 8\" forward lunges x20 BL         Green tband knee flexion and extension 2x20 BL

## 2017-08-07 ENCOUNTER — HOSPITAL ENCOUNTER (OUTPATIENT)
Dept: PHYSICAL THERAPY | Facility: HOSPITAL | Age: 52
Setting detail: THERAPIES SERIES
Discharge: HOME | End: 2017-08-07
Attending: PHYSICIAN ASSISTANT
Payer: COMMERCIAL

## 2017-08-07 PROCEDURE — 97140 MANUAL THERAPY 1/> REGIONS: CPT

## 2017-08-07 PROCEDURE — 97110 THERAPEUTIC EXERCISES: CPT

## 2017-08-07 NOTE — PROGRESS NOTES
Dx: lumbar radiculitis         Authorized # of Visits:           Next MD visit: none scheduled  Fall Risk: standard         Precautions: n/a             Subjective: Patient 15' late for her appointment. Patient states she is in a lot of pain today.     Obj Standing gastroc stretch with foot on step and heel on floor 4x30 sec BL Standing gastroc stretch with foot on step and heel on floor 4x30 sec BL      PNE: education regarding sleep tips, sensitive nervous system, alarm system x10' total PNE: education reg

## 2017-08-10 ENCOUNTER — HOSPITAL ENCOUNTER (OUTPATIENT)
Dept: PHYSICAL THERAPY | Facility: HOSPITAL | Age: 52
Setting detail: THERAPIES SERIES
Discharge: HOME | End: 2017-08-10
Attending: PHYSICIAN ASSISTANT
Payer: COMMERCIAL

## 2017-08-10 PROCEDURE — 97140 MANUAL THERAPY 1/> REGIONS: CPT

## 2017-08-10 PROCEDURE — 97112 NEUROMUSCULAR REEDUCATION: CPT

## 2017-08-10 PROCEDURE — 97110 THERAPEUTIC EXERCISES: CPT

## 2017-08-14 ENCOUNTER — TELEPHONE (OUTPATIENT)
Dept: SURGERY | Facility: CLINIC | Age: 52
End: 2017-08-14

## 2017-08-14 ENCOUNTER — HOSPITAL ENCOUNTER (OUTPATIENT)
Dept: PHYSICAL THERAPY | Facility: HOSPITAL | Age: 52
Setting detail: THERAPIES SERIES
Discharge: HOME | End: 2017-08-14
Attending: PHYSICIAN ASSISTANT
Payer: COMMERCIAL

## 2017-08-14 PROCEDURE — 97140 MANUAL THERAPY 1/> REGIONS: CPT

## 2017-08-14 PROCEDURE — 97110 THERAPEUTIC EXERCISES: CPT

## 2017-08-14 NOTE — PROGRESS NOTES
Dx: lumbar radiculitis         Authorized # of Visits:           Next MD visit: none scheduled  Fall Risk: standard         Precautions: n/a             Subjective: Patient competed in her triathlon over the weekend and states she felt great.   She used tap DF stretching x15' total    SB ex: supine lower trunk rotations x2', SB DKTC x20 SB ex: supine lower trunk rotations x2', SB DKTC x20  SB ex: supine lower trunk rotations x2', SB DKTC x20 SB ex: supine lower trunk rotations x2'  SB DKTC x20 SB ex: supine l

## 2017-08-15 ENCOUNTER — TELEPHONE (OUTPATIENT)
Dept: SURGERY | Facility: CLINIC | Age: 52
End: 2017-08-15

## 2017-08-15 NOTE — TELEPHONE ENCOUNTER
Spoke to pt regarding office injection. She is stating that she finished her PT as discussed during last OV and she would like to schedule injections. Per last OV notes, only one injection was recommended for pt.  Right plantar fascia injection which is angie

## 2017-08-15 NOTE — TELEPHONE ENCOUNTER
Called pt to get new insurance information from her. She stated that nothing has changed. RN advised her to call our office once she receives information packet from [facility]. Pt verbalizes understanding.       Also, pt notified that once her ne

## 2017-08-15 NOTE — TELEPHONE ENCOUNTER
Per Ky Rand, pt can receive injection on both feet. Pt notified and advised to have a  just in case. Pre-op instruction reviewed with pt. Pt switched her insurance from [facility] to [facility] and is asking if she needs new MRI order.

## 2017-08-17 ENCOUNTER — APPOINTMENT (OUTPATIENT)
Dept: PHYSICAL THERAPY | Facility: HOSPITAL | Age: 52
End: 2017-08-17
Attending: PHYSICIAN ASSISTANT
Payer: COMMERCIAL

## 2017-09-06 NOTE — TELEPHONE ENCOUNTER
Spoke to patient, states she is active with [facility], states member ID remained the same. Patient advised office will obtain prior authorization from new insurance for injection next week. Patient verbalized understanding, no further needs.      Attempted to

## 2017-09-07 NOTE — TELEPHONE ENCOUNTER
Patient provided [facility] member ID: [id number]. Eligibility e-verified.

## 2017-09-07 NOTE — TELEPHONE ENCOUNTER
Spoke to Robb BURKS at [facility] to request authorization for Plantar Fascia injection, provided cpt code 05075.  Robb Murray states Jaz does not review code, no prior authorization required, but advised to call [facility] as authorization may be needed through the

## 2017-09-14 NOTE — PROGRESS NOTES
Name: Pierre Vergara   : 1965   DOS: 2017     Pain Clinic Follow Up Visit:   No chief complaint on file. Pierre Vergara is a 46year old female who presents for recheck of chronic low back pain and knee pain.  I orderd MRI L

## 2017-09-15 ENCOUNTER — OFFICE VISIT (OUTPATIENT)
Dept: SURGERY | Facility: CLINIC | Age: 52
End: 2017-09-15

## 2017-09-15 ENCOUNTER — TELEPHONE (OUTPATIENT)
Dept: SURGERY | Facility: CLINIC | Age: 52
End: 2017-09-15

## 2017-09-15 VITALS
WEIGHT: 210 LBS | BODY MASS INDEX: 31.83 KG/M2 | HEIGHT: 68 IN | RESPIRATION RATE: 16 BRPM | SYSTOLIC BLOOD PRESSURE: 128 MMHG | HEART RATE: 78 BPM | DIASTOLIC BLOOD PRESSURE: 68 MMHG

## 2017-09-15 DIAGNOSIS — G89.29 CHRONIC RIGHT SHOULDER PAIN: ICD-10-CM

## 2017-09-15 DIAGNOSIS — M25.511 CHRONIC RIGHT SHOULDER PAIN: ICD-10-CM

## 2017-09-15 DIAGNOSIS — M72.2 PLANTAR FASCIITIS, BILATERAL: Primary | ICD-10-CM

## 2017-09-15 DIAGNOSIS — M25.561 CHRONIC PAIN OF RIGHT KNEE: ICD-10-CM

## 2017-09-15 DIAGNOSIS — G89.29 CHRONIC PAIN OF RIGHT KNEE: ICD-10-CM

## 2017-09-15 DIAGNOSIS — M54.16 LUMBAR RADICULOPATHY: ICD-10-CM

## 2017-09-15 PROCEDURE — 20552 NJX 1/MLT TRIGGER POINT 1/2: CPT | Performed by: NURSE PRACTITIONER

## 2017-09-15 RX ORDER — LIDOCAINE HYDROCHLORIDE 10 MG/ML
20 INJECTION, SOLUTION INFILTRATION; PERINEURAL ONCE
Status: COMPLETED | OUTPATIENT
Start: 2017-09-15 | End: 2017-09-15

## 2017-09-15 RX ORDER — METHYLPREDNISOLONE ACETATE 40 MG/ML
80 INJECTION, SUSPENSION INTRA-ARTICULAR; INTRALESIONAL; INTRAMUSCULAR; SOFT TISSUE ONCE
Status: COMPLETED | OUTPATIENT
Start: 2017-09-15 | End: 2017-09-15

## 2017-09-15 RX ADMIN — METHYLPREDNISOLONE ACETATE 80 MG: 40 INJECTION, SUSPENSION INTRA-ARTICULAR; INTRALESIONAL; INTRAMUSCULAR; SOFT TISSUE at 09:38:00

## 2017-09-15 RX ADMIN — LIDOCAINE HYDROCHLORIDE 20 ML: 10 INJECTION, SOLUTION INFILTRATION; PERINEURAL at 09:37:00

## 2017-09-15 NOTE — TELEPHONE ENCOUNTER
Which please attempt to pre-certify MRI of lumbar spine and right knee MRI which were ordered in July.   Patient insurance at that time denied MRI because she needed to do physical therapy first.  She did complete physical therapy for 6 weeks with no relief

## 2017-09-15 NOTE — PROGRESS NOTES
Name: Pierre Vergara   : 1965   DOS: 9/15/2017     Pain Clinic Follow Up Visit:   Pierre Vergara is a 46year old female who presents for bilateral plantar fascia injections in the office today.     She is also been experiencing incre lidocaine 5 % External Patch Place 1 patch onto the skin daily. Disp: 30 patch Rfl: 1         EXAM:   /68   Pulse 78   Resp 16   Ht 68\"   Wt 210 lb   BMI 31.93 kg/m²   General: 46year old female in no acute distress.   Neurologic: Alert, oriented, 12:39 PM

## 2017-09-15 NOTE — TELEPHONE ENCOUNTER
Would you please delete the progress note that you had previously opened for this patient today. Since I did the consent, I completed my own progress note.   Thank you

## 2017-09-15 NOTE — PATIENT INSTRUCTIONS
Refill policies:    • Allow 2-3 business days for refills; controlled substances may take longer.   • Contact your pharmacy at least 5 days prior to running out of medication and have them send an electronic request or submit request through the [facility] have a procedure or additional testing performed. [facility]) will contact your insurance carrier to obtain pre-certification or prior authorization.     Unfortunately, MONICA has seen an increase in denial of payment even though the p

## 2017-09-17 NOTE — PROCEDURES
[facility]  Operative Report  9/15/2017     Melissa Lazar Patient Status:  No patient class for patient encounter    1965 MRN [id number]   Location [address] [place] [address], [address] [place] [address], [address] [place] [address] Attending No att. providers found   H

## 2017-09-18 NOTE — TELEPHONE ENCOUNTER
Spoke to Rey matamoros [facility], codes  are valid and billable, no prior authorization or predetermination required.  Call reference: [phone] call time 5:59

## 2017-09-27 ENCOUNTER — APPOINTMENT (OUTPATIENT)
Dept: LAB | Facility: HOSPITAL | Age: 52
End: 2017-09-27
Attending: FAMILY MEDICINE
Payer: MEDICAID

## 2017-09-27 ENCOUNTER — TELEPHONE (OUTPATIENT)
Dept: FAMILY MEDICINE CLINIC | Facility: CLINIC | Age: 52
End: 2017-09-27

## 2017-09-27 DIAGNOSIS — E01.0 THYROMEGALY: Primary | ICD-10-CM

## 2017-09-27 DIAGNOSIS — E01.0 THYROMEGALY: ICD-10-CM

## 2017-09-27 PROCEDURE — 36415 COLL VENOUS BLD VENIPUNCTURE: CPT

## 2017-09-27 PROCEDURE — 84443 ASSAY THYROID STIM HORMONE: CPT

## 2017-09-27 PROCEDURE — 84439 ASSAY OF FREE THYROXINE: CPT

## 2017-09-27 NOTE — TELEPHONE ENCOUNTER
----- Message from Nia Prado MD sent at 9/27/2017 12:58 PM CDT -----  Please let patient know or leave message that her thyroid is well regulated.   Continue present medications  Repeat tsh and free t4 in 6 months  Thanks

## 2017-10-10 ENCOUNTER — HOSPITAL ENCOUNTER (OUTPATIENT)
Dept: MRI IMAGING | Age: 52
Discharge: HOME | End: 2017-10-10
Attending: PHYSICIAN ASSISTANT
Payer: MEDICAID

## 2017-10-10 DIAGNOSIS — M54.16 LUMBAR RADICULITIS: ICD-10-CM

## 2017-10-10 DIAGNOSIS — G89.29 CHRONIC PAIN OF RIGHT KNEE: ICD-10-CM

## 2017-10-10 DIAGNOSIS — M25.561 CHRONIC PAIN OF RIGHT KNEE: ICD-10-CM

## 2017-10-10 PROCEDURE — 72148 MRI LUMBAR SPINE W/O DYE: CPT | Performed by: PHYSICIAN ASSISTANT

## 2017-10-10 PROCEDURE — 73721 MRI JNT OF LWR EXTRE W/O DYE: CPT | Performed by: PHYSICIAN ASSISTANT

## 2017-10-19 PROBLEM — M17.11 PRIMARY OSTEOARTHRITIS OF RIGHT KNEE: Status: ACTIVE | Noted: 2017-10-19

## 2017-10-25 ENCOUNTER — TELEPHONE (OUTPATIENT)
Dept: SURGERY | Facility: CLINIC | Age: 52
End: 2017-10-25

## 2017-10-25 DIAGNOSIS — M25.811 CLICKING RIGHT SHOULDER: ICD-10-CM

## 2017-10-25 DIAGNOSIS — M25.511 CHRONIC RIGHT SHOULDER PAIN: Primary | ICD-10-CM

## 2017-10-25 DIAGNOSIS — G89.29 CHRONIC RIGHT SHOULDER PAIN: Primary | ICD-10-CM

## 2017-10-26 NOTE — TELEPHONE ENCOUNTER
Spoke to pt regarding her right shoulder pain which she states her now is 9/10. Pt would like to get MRI prior to any injections. RN provided her with central scheduling phone number and the order number.  Pt will call our office regarding MRI results and i

## 2017-11-08 ENCOUNTER — HOSPITAL ENCOUNTER (OUTPATIENT)
Dept: GENERAL RADIOLOGY | Facility: HOSPITAL | Age: 52
Discharge: HOME | End: 2017-11-08
Attending: NURSE PRACTITIONER
Payer: MEDICAID

## 2017-11-08 DIAGNOSIS — G89.29 CHRONIC RIGHT SHOULDER PAIN: ICD-10-CM

## 2017-11-08 DIAGNOSIS — M25.511 CHRONIC RIGHT SHOULDER PAIN: ICD-10-CM

## 2017-11-08 PROCEDURE — 73030 X-RAY EXAM OF SHOULDER: CPT | Performed by: NURSE PRACTITIONER

## 2017-11-08 NOTE — TELEPHONE ENCOUNTER
Please let patient know that her insurance would not approve the MRI of the right shoulder. They require that she begins with a right shoulder x-ray which I have ordered.   Thank you

## 2017-11-08 NOTE — TELEPHONE ENCOUNTER
Patient called asking about MRI. I advised her of prior note; that x-ray needed to be done first before insurance would approve MRI and that x-ray order was in the system. She was happy and will schedule x-ray soon.

## 2017-11-08 NOTE — TELEPHONE ENCOUNTER
Denied MRI Right Shoulder cpt code 47511    Based on [facility] Musculoskeletal Imaging Guidelines, we cannot approve this request. Your records show that you have signs and/or symptoms related to the body system that provides form, support, strength, and mot

## 2017-11-09 NOTE — TELEPHONE ENCOUNTER
Called pt and notified her to of the message below. Also, advised her to wait a week prior to scheduling her MRI to make sure that is covered under her insurance. Pt verbalized understanding.

## 2017-11-09 NOTE — TELEPHONE ENCOUNTER
Given patient's continued pain and slopping acromion which can be associated with rotator cuff pathology, I have ordered right shoulder MRI. Would you please ask her to wait a couple of days and be sure it is approved by her insurance prior to scheduling.

## 2017-11-27 NOTE — TELEPHONE ENCOUNTER
Called [employer]. Spoke to appeals rep  [place] advsied rep that per original denial pt has completed Xray of shoulder. Rep states that at this time an appeal has to be submitted in writing along with the completed designation form signed by patient.

## 2017-11-30 NOTE — TELEPHONE ENCOUNTER
Spoke to patient, advised that office is trying to complete appeal for MRI Shoulder, but need signed Delegation Form. Patient requests form be faxed to her attention at [phone]. Form faxed, fax confirmation received.

## 2017-12-06 NOTE — TELEPHONE ENCOUNTER
Spoke to patient, states she did receive fax, but is not able to fax the form back. Patient states she will stop in the office to sign the forms. No further needs at this time.

## 2017-12-13 NOTE — TELEPHONE ENCOUNTER
Appeal letter drafted and submitted to [address] [place] [address] with XR report and clinical notes.

## 2017-12-18 NOTE — TELEPHONE ENCOUNTER
Received fax from [facility] saying that they have received our appeal and it can take up to 30 days for a decision.     Request  ID #[id number]

## 2018-01-03 ENCOUNTER — TELEPHONE (OUTPATIENT)
Dept: FAMILY MEDICINE CLINIC | Facility: CLINIC | Age: 53
End: 2018-01-03

## 2018-01-03 NOTE — TELEPHONE ENCOUNTER
Patient contacted office, states she received letter of authorization for MRI shoulder, office submitted appeal in December. Patient provided authorization #[id number], valid through 1/28/18.  Patient advised to contact central scheduling to proceed with MR

## 2018-01-12 ENCOUNTER — HOSPITAL ENCOUNTER (OUTPATIENT)
Dept: MRI IMAGING | Age: 53
Discharge: HOME | End: 2018-01-12
Attending: NURSE PRACTITIONER
Payer: MEDICAID

## 2018-01-12 DIAGNOSIS — G89.29 CHRONIC RIGHT SHOULDER PAIN: ICD-10-CM

## 2018-01-12 DIAGNOSIS — M25.811 CLICKING RIGHT SHOULDER: ICD-10-CM

## 2018-01-12 DIAGNOSIS — M25.511 CHRONIC RIGHT SHOULDER PAIN: ICD-10-CM

## 2018-01-12 PROCEDURE — 73221 MRI JOINT UPR EXTREM W/O DYE: CPT | Performed by: NURSE PRACTITIONER

## 2018-01-17 ENCOUNTER — TELEPHONE (OUTPATIENT)
Dept: SURGERY | Facility: CLINIC | Age: 53
End: 2018-01-17

## 2018-01-17 DIAGNOSIS — G89.29 CHRONIC RIGHT SHOULDER PAIN: Primary | ICD-10-CM

## 2018-01-17 DIAGNOSIS — M25.511 CHRONIC RIGHT SHOULDER PAIN: Primary | ICD-10-CM

## 2018-01-17 NOTE — TELEPHONE ENCOUNTER
Radiologist notes tendon tears on right shoulder MRI, Dr. Derrick Nichole recommends patient consult with Dr. Efrain Zimmerman, orthopedic specialist regarding her right shoulder pain. I will place the referral, please let her know.    Thanks

## 2018-01-18 ENCOUNTER — TELEPHONE (OUTPATIENT)
Dept: NEUROLOGY | Facility: CLINIC | Age: 53
End: 2018-01-18

## 2018-01-18 NOTE — TELEPHONE ENCOUNTER
----- Message from GIN Byrd sent at 1/17/2018 11:43 AM CST -----  Radiologist notes tendon tears on right shoulder MRI, Dr. Lucita Wallace recommends patient consult with Dr. Tri Nicole, orthopedic specialist regarding her right shoulder pain.  I will place t

## 2018-01-19 NOTE — TELEPHONE ENCOUNTER
Spoke with patient and informed patient of message below. Pt verbalized understanding and agreeable to plan. Contact info for Dr. Ana Love given. No further questions at this time.        [address] [facility] [address]   Phone: [phone]

## 2018-01-24 PROBLEM — M75.121 COMPLETE TEAR OF RIGHT ROTATOR CUFF: Status: ACTIVE | Noted: 2018-01-24

## 2018-02-02 RX ORDER — ACETAMINOPHEN 325 MG/1
325 TABLET ORAL AS NEEDED
COMMUNITY
End: 2019-06-10 | Stop reason: ALTCHOICE

## 2018-02-02 RX ORDER — CEFAZOLIN SODIUM/WATER 2 G/20 ML
2 SYRINGE (ML) INTRAVENOUS ONCE
Status: CANCELLED | OUTPATIENT
Start: 2018-02-02 | End: 2018-02-02

## 2018-02-02 RX ORDER — MULTIVIT-MIN/IRON FUM/FOLIC AC 7.5 MG-4
1 TABLET ORAL DAILY
COMMUNITY
End: 2019-08-07 | Stop reason: ALTCHOICE

## 2018-02-20 NOTE — H&P
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    PATIENT ACCOUNT#:   [de-identified]    LOCATION:  OR   [facility]  MEDICAL RECORD #:   [id number]       YOB: 1965  ADMISSION DATE:       02/23/2018 II-XII are intact. PLAN:  The patient is scheduled for elective right knee arthroscopy, meniscectomy, debridement, chondroplasty as appropriate. Limitations, risks, complications, postoperative scenarios are stressed.   The fact that she does have an el

## 2018-02-23 ENCOUNTER — ANESTHESIA EVENT (OUTPATIENT)
Dept: SURGERY | Facility: HOSPITAL | Age: 53
End: 2018-02-23

## 2018-02-23 ENCOUNTER — ANESTHESIA (OUTPATIENT)
Dept: SURGERY | Facility: HOSPITAL | Age: 53
End: 2018-02-23

## 2018-02-23 ENCOUNTER — SURGERY (OUTPATIENT)
Age: 53
End: 2018-02-23

## 2018-02-23 ENCOUNTER — HOSPITAL ENCOUNTER (OUTPATIENT)
Facility: HOSPITAL | Age: 53
Setting detail: HOSPITAL OUTPATIENT SURGERY
Discharge: HOME | End: 2018-02-23
Attending: ORTHOPAEDIC SURGERY | Admitting: ORTHOPAEDIC SURGERY
Payer: MEDICAID

## 2018-02-23 VITALS
RESPIRATION RATE: 16 BRPM | WEIGHT: 225 LBS | HEIGHT: 68 IN | OXYGEN SATURATION: 100 % | TEMPERATURE: 98 F | HEART RATE: 71 BPM | DIASTOLIC BLOOD PRESSURE: 86 MMHG | SYSTOLIC BLOOD PRESSURE: 116 MMHG | BODY MASS INDEX: 34.1 KG/M2

## 2018-02-23 DIAGNOSIS — M17.11 PRIMARY OSTEOARTHRITIS OF RIGHT KNEE: ICD-10-CM

## 2018-02-23 DIAGNOSIS — S83.231A COMPLEX TEAR OF MEDIAL MENISCUS OF RIGHT KNEE AS CURRENT INJURY, INITIAL ENCOUNTER: ICD-10-CM

## 2018-02-23 LAB
CHLORIDE: 107 MMOL/L (ref 101–111)
CO2: 26 MMOL/L (ref 22–32)
POTASSIUM SERPL-SCNC: 4 MMOL/L (ref 3.6–5.1)
SODIUM SERPL-SCNC: 140 MMOL/L (ref 136–144)

## 2018-02-23 PROCEDURE — 0SBC4ZZ EXCISION OF RIGHT KNEE JOINT, PERCUTANEOUS ENDOSCOPIC APPROACH: ICD-10-PCS | Performed by: ORTHOPAEDIC SURGERY

## 2018-02-23 PROCEDURE — 80051 ELECTROLYTE PANEL: CPT

## 2018-02-23 RX ORDER — HYDROCODONE BITARTRATE AND ACETAMINOPHEN 5; 325 MG/1; MG/1
2 TABLET ORAL AS NEEDED
Status: COMPLETED | OUTPATIENT
Start: 2018-02-23 | End: 2018-02-23

## 2018-02-23 RX ORDER — ONDANSETRON 2 MG/ML
4 INJECTION INTRAMUSCULAR; INTRAVENOUS AS NEEDED
Status: DISCONTINUED | OUTPATIENT
Start: 2018-02-23 | End: 2018-02-23

## 2018-02-23 RX ORDER — BUPIVACAINE HYDROCHLORIDE AND EPINEPHRINE 5; 5 MG/ML; UG/ML
INJECTION, SOLUTION EPIDURAL; INTRACAUDAL; PERINEURAL AS NEEDED
Status: DISCONTINUED | OUTPATIENT
Start: 2018-02-23 | End: 2018-02-23 | Stop reason: HOSPADM

## 2018-02-23 RX ORDER — METOCLOPRAMIDE HYDROCHLORIDE 5 MG/ML
10 INJECTION INTRAMUSCULAR; INTRAVENOUS AS NEEDED
Status: DISCONTINUED | OUTPATIENT
Start: 2018-02-23 | End: 2018-02-23

## 2018-02-23 RX ORDER — SODIUM CHLORIDE, SODIUM LACTATE, POTASSIUM CHLORIDE, CALCIUM CHLORIDE 600; 310; 30; 20 MG/100ML; MG/100ML; MG/100ML; MG/100ML
INJECTION, SOLUTION INTRAVENOUS CONTINUOUS
Status: DISCONTINUED | OUTPATIENT
Start: 2018-02-23 | End: 2018-02-23

## 2018-02-23 RX ORDER — MIDAZOLAM HYDROCHLORIDE 1 MG/ML
1 INJECTION INTRAMUSCULAR; INTRAVENOUS EVERY 5 MIN PRN
Status: DISCONTINUED | OUTPATIENT
Start: 2018-02-23 | End: 2018-02-23

## 2018-02-23 RX ORDER — HYDROCODONE BITARTRATE AND ACETAMINOPHEN 5; 325 MG/1; MG/1
1 TABLET ORAL AS NEEDED
Status: COMPLETED | OUTPATIENT
Start: 2018-02-23 | End: 2018-02-23

## 2018-02-23 RX ORDER — HYDROCODONE BITARTRATE AND ACETAMINOPHEN 5; 325 MG/1; MG/1
1-2 TABLET ORAL EVERY 6 HOURS PRN
Qty: 40 TABLET | Refills: 0 | Status: SHIPPED | OUTPATIENT
Start: 2018-02-23 | End: 2019-08-07 | Stop reason: ALTCHOICE

## 2018-02-23 RX ORDER — NALOXONE HYDROCHLORIDE 0.4 MG/ML
80 INJECTION, SOLUTION INTRAMUSCULAR; INTRAVENOUS; SUBCUTANEOUS AS NEEDED
Status: DISCONTINUED | OUTPATIENT
Start: 2018-02-23 | End: 2018-02-23

## 2018-02-23 RX ORDER — MEPERIDINE HYDROCHLORIDE 25 MG/ML
12.5 INJECTION INTRAMUSCULAR; INTRAVENOUS; SUBCUTANEOUS AS NEEDED
Status: DISCONTINUED | OUTPATIENT
Start: 2018-02-23 | End: 2018-02-23

## 2018-02-23 RX ORDER — MORPHINE SULFATE 2 MG/ML
2 INJECTION, SOLUTION INTRAMUSCULAR; INTRAVENOUS EVERY 5 MIN PRN
Status: DISCONTINUED | OUTPATIENT
Start: 2018-02-23 | End: 2018-02-23

## 2018-02-23 NOTE — OPERATIVE REPORT
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 OPERATING PHYSICIAN: Tri Ohara M.D.    PATIENT ACCOUNT#:   [de-identified]    LOCATION:  [address] [place] [address] 10  MEDICAL RECORD #:   [id number] seen along the patella. The trochlear groove shows grade 3 change. An inferomedial portal is established. Fat pad is debrided. Ligamentum mucosa is released. The ACL is intact.   The medial compartment shows complex meniscal abnormality with horizontal

## 2018-02-23 NOTE — ANESTHESIA PREPROCEDURE EVALUATION
PRE-OP EVALUATION    Patient Name: Brant Serrato [facility]    Pre-op Diagnosis: Primary osteoarthritis of right knee [M17.11]  Complex tear of medial meniscus of right knee as current injury, initial encounter [I52.809Y]    Procedure(s):  ARTHROSCOPY WITH PA oz/week     Comment: social       Drug use: No     Available pre-op labs reviewed.                Airway      Mallampati: II  Mouth opening: >3 FB  TM distance: 4 - 6 cm  Neck ROM: full Cardiovascular      Rhythm: regular  Rate: normal  (-) murmur   Dental

## 2018-02-23 NOTE — BRIEF OP NOTE
Pre-Operative Diagnosis: Primary osteoarthritis of right knee [M17.11]  Complex tear of medial meniscus of right knee as current injury, initial encounter [P02.290A]     Post-Operative Diagnosis: Primary osteoarthritis of right knee [M17.11]Complex tear

## 2018-02-23 NOTE — ANESTHESIA POSTPROCEDURE EVALUATION
[address] [place] [address] Patient Status:  Hospital Outpatient Surgery   Age/Gender 46year old female MRN [id number]   Location [address] [facility] Attending Kimo Hanson MD   Hosp Day # 0 PCP Matthew Zeng DO

## 2018-02-23 NOTE — INTERVAL H&P NOTE
Pre-op Diagnosis: Primary osteoarthritis of right knee [M17.11]  Complex tear of medial meniscus of right knee as current injury, initial encounter [L84.519G]    The above referenced H&P was reviewed by Jeannine Castrejon MD on 2/23/2018, the patient was exa

## 2018-03-07 ENCOUNTER — HOSPITAL ENCOUNTER (OUTPATIENT)
Dept: PHYSICAL THERAPY | Facility: HOSPITAL | Age: 53
Setting detail: THERAPIES SERIES
Discharge: HOME | End: 2018-03-07
Attending: OCCUPATIONAL THERAPIST
Payer: MEDICAID

## 2018-03-07 PROCEDURE — 97161 PT EVAL LOW COMPLEX 20 MIN: CPT

## 2018-03-07 PROCEDURE — 97110 THERAPEUTIC EXERCISES: CPT

## 2018-03-08 PROBLEM — Z47.89 ORTHOPEDIC AFTERCARE: Status: ACTIVE | Noted: 2018-03-08

## 2018-03-12 ENCOUNTER — APPOINTMENT (OUTPATIENT)
Dept: PHYSICAL THERAPY | Facility: HOSPITAL | Age: 53
End: 2018-03-12
Attending: OCCUPATIONAL THERAPIST
Payer: MEDICAID

## 2018-03-13 NOTE — PROGRESS NOTES
POST-OP KNEE EVALUATION:   Referring Physician: Dr. Wilbert Ernst  Diagnosis: R medial meniscus tear s/p     Date of Service: 3/7/2018     PATIENT SUMMARY   Kemal Weinstein is a 46year old y/o female who presents to therapy today s/p R medial menisca 5/5  Abduction: R 4+/5; L 5/5  ER: R 5-/5; L 5/5 Flexion: R 4+/5; L 5/5  Extension: R 4+/5; L 5/5        Balance: SLS: R 10 sec, L >30 sec    Today’s Treatment and Response: Patient education provided on use of icing, correct gait sequencing.    Patient was questions, please contact me at Dept: [phone]    Sincerely,  Electronically signed by therapist: Luz Serrano, PT    Physician's certification required: Yes  I certify the need for these services furnished under this plan of treatment and while u

## 2018-03-15 ENCOUNTER — HOSPITAL ENCOUNTER (OUTPATIENT)
Dept: PHYSICAL THERAPY | Facility: HOSPITAL | Age: 53
Setting detail: THERAPIES SERIES
Discharge: HOME | End: 2018-03-15
Attending: OCCUPATIONAL THERAPIST
Payer: MEDICAID

## 2018-03-15 PROCEDURE — 97110 THERAPEUTIC EXERCISES: CPT

## 2018-03-15 NOTE — PROGRESS NOTES
Dx: : R medial meniscus tear s/p          Authorized # of Visits:  ?         Next MD visit: none scheduled  Fall Risk: standard         Precautions: n/a             Subjective: R knee feeling better.  Able to put socks on without difficulty    Objective: am

## 2018-03-19 ENCOUNTER — HOSPITAL ENCOUNTER (OUTPATIENT)
Dept: PHYSICAL THERAPY | Facility: HOSPITAL | Age: 53
Setting detail: THERAPIES SERIES
Discharge: HOME | End: 2018-03-19
Attending: OCCUPATIONAL THERAPIST
Payer: MEDICAID

## 2018-03-19 PROCEDURE — 97110 THERAPEUTIC EXERCISES: CPT

## 2018-03-19 NOTE — PROGRESS NOTES
Dx: : R medial meniscus tear s/p          Authorized # of Visits:  ?         Next MD visit: none scheduled  Fall Risk: standard         Precautions: n/a             Subjective: walking getting easier. Knee feels tight while transferring out of car.     Leonce Liming

## 2018-03-21 ENCOUNTER — HOSPITAL ENCOUNTER (OUTPATIENT)
Dept: PHYSICAL THERAPY | Facility: HOSPITAL | Age: 53
Setting detail: THERAPIES SERIES
Discharge: HOME | End: 2018-03-21
Attending: OCCUPATIONAL THERAPIST
Payer: MEDICAID

## 2018-03-21 PROCEDURE — 97110 THERAPEUTIC EXERCISES: CPT

## 2018-03-21 NOTE — PROGRESS NOTES
Dx: : R medial meniscus tear s/p          Authorized # of Visits:  ?         Next MD visit: none scheduled  Fall Risk: standard         Precautions: n/a             Subjective: no new c/o    Objective: ambulated into dept without assistive device.  normaliz ea           Charges: TEx3 (TEx2, MT)   Total Timed Treatment: 45 min     Total Treatment Time: 45 min

## 2018-03-26 ENCOUNTER — HOSPITAL ENCOUNTER (OUTPATIENT)
Dept: PHYSICAL THERAPY | Facility: HOSPITAL | Age: 53
Setting detail: THERAPIES SERIES
Discharge: HOME | End: 2018-03-26
Attending: OCCUPATIONAL THERAPIST
Payer: MEDICAID

## 2018-03-26 PROCEDURE — 97110 THERAPEUTIC EXERCISES: CPT

## 2018-03-26 PROCEDURE — 97140 MANUAL THERAPY 1/> REGIONS: CPT

## 2018-03-26 NOTE — PROGRESS NOTES
Discharge Summary    Pt has attended 5, cancelled 0, and no shown 0 visits in Physical Therapy. Dx: : R medial meniscus tear s/p                      Subjective: . Pain- 0-3/10 (was: 4-8/10).  Set up bike at home and doing home exercise program . Has p wnl  Gastroc-soleus: R WNL (WAS:min); L wnl  Piriformis: R wnl; L wnl  Quads: R mod; L min     Strength/MMT:  Hip Knee   Flexion: R 5/5 (WAS:4+/5); L 5/5  Extension: R 5/5 (WAS: 4+/5); L 5/5  Abduction: R 5/5 (WAS:4+/5); L 5/5  ER: R 5/5 (WAS:5-/5); L 5/5 therapist: Mitesh Zhu PT         Date: 3/15/2018  Tx#: 2/8 Date: 3/19  Tx#: 3/ Date: 3/21  Tx#: 4/ Date: 3/26  Tx#: 5/  Progress note Date: Tx#: 6/ Date: Tx#: 7/ Date:    Tx#: 8/   Upright bike 5 min L3 Upright bike 5 min L5 Upright bike 5 min L

## 2018-03-27 ENCOUNTER — TELEPHONE (OUTPATIENT)
Dept: FAMILY MEDICINE CLINIC | Facility: CLINIC | Age: 53
End: 2018-03-27

## 2018-03-28 ENCOUNTER — APPOINTMENT (OUTPATIENT)
Dept: PHYSICAL THERAPY | Facility: HOSPITAL | Age: 53
End: 2018-03-28
Attending: OCCUPATIONAL THERAPIST
Payer: MEDICAID

## 2018-03-28 PROBLEM — M12.811 ROTATOR CUFF TEAR ARTHROPATHY OF RIGHT SHOULDER: Status: ACTIVE | Noted: 2018-03-28

## 2018-03-28 PROBLEM — M75.101 ROTATOR CUFF TEAR ARTHROPATHY OF RIGHT SHOULDER: Status: ACTIVE | Noted: 2018-03-28

## 2018-03-29 ENCOUNTER — HOSPITAL ENCOUNTER (OUTPATIENT)
Dept: PHYSICAL THERAPY | Facility: HOSPITAL | Age: 53
Setting detail: THERAPIES SERIES
Discharge: HOME | End: 2018-03-29
Attending: NURSE PRACTITIONER
Payer: MEDICAID

## 2018-03-29 PROCEDURE — 97110 THERAPEUTIC EXERCISES: CPT

## 2018-03-29 PROCEDURE — 97150 GROUP THERAPEUTIC PROCEDURES: CPT

## 2018-03-29 PROCEDURE — 97140 MANUAL THERAPY 1/> REGIONS: CPT

## 2018-04-02 NOTE — PROGRESS NOTES
Dx: : R medial meniscus tear s/p          Pt has attended 5, cancelled 0, and no shown 0 visits in Physical Therapy.      Dx: : R medial meniscus tear s/p                       Subjective: . Pain- 0-3/10 (was: 4-8/10).  Set up bike at home and doing h Mobility/Accessory motion: WNL (WAS:decreased) R patella all directions, but primarily inferior     Flexibility:   Hamstrings: R WNL (WAS: min); L wnl  Gastroc-soleus: R WNL (WAS:min); L wnl  Piriformis: R wnl; L wnl  Quads: R mod; L min     Strength/MMT: min L3 Upright bike 5 min L5 Upright bike 5 min L5 Upright bike 5 min L5 Upright bike 5 min L5   STM R knee    PROM prone x30 STM R knee/ITB    PROM prone x30 STM R knee/ITB    PROM prone x30    Resisted knee flexion GTB x30    SLR x30 STM R quad w roller

## 2018-04-10 ENCOUNTER — APPOINTMENT (OUTPATIENT)
Dept: PHYSICAL THERAPY | Facility: HOSPITAL | Age: 53
End: 2018-04-10
Attending: NURSE PRACTITIONER
Payer: MEDICAID

## 2018-04-12 ENCOUNTER — APPOINTMENT (OUTPATIENT)
Dept: PHYSICAL THERAPY | Facility: HOSPITAL | Age: 53
End: 2018-04-12
Attending: NURSE PRACTITIONER
Payer: MEDICAID

## 2018-04-19 ENCOUNTER — APPOINTMENT (OUTPATIENT)
Dept: PHYSICAL THERAPY | Facility: HOSPITAL | Age: 53
End: 2018-04-19
Attending: NURSE PRACTITIONER
Payer: MEDICAID

## 2018-04-23 RX ORDER — SODIUM CHLORIDE, SODIUM LACTATE, POTASSIUM CHLORIDE, CALCIUM CHLORIDE 600; 310; 30; 20 MG/100ML; MG/100ML; MG/100ML; MG/100ML
INJECTION, SOLUTION INTRAVENOUS CONTINUOUS
Status: CANCELLED | OUTPATIENT
Start: 2018-04-23

## 2018-05-09 ENCOUNTER — APPOINTMENT (OUTPATIENT)
Dept: LAB | Facility: HOSPITAL | Age: 53
End: 2018-05-09
Attending: ORTHOPAEDIC SURGERY
Payer: MEDICAID

## 2018-05-09 DIAGNOSIS — M75.101 ROTATOR CUFF TEAR ARTHROPATHY OF RIGHT SHOULDER: ICD-10-CM

## 2018-05-09 DIAGNOSIS — E87.6 HYPOKALEMIA: ICD-10-CM

## 2018-05-09 DIAGNOSIS — M12.811 ROTATOR CUFF TEAR ARTHROPATHY OF RIGHT SHOULDER: ICD-10-CM

## 2018-05-09 PROCEDURE — 36415 COLL VENOUS BLD VENIPUNCTURE: CPT

## 2018-05-09 PROCEDURE — 80051 ELECTROLYTE PANEL: CPT

## 2018-05-09 PROCEDURE — 93010 ELECTROCARDIOGRAM REPORT: CPT | Performed by: INTERNAL MEDICINE

## 2018-05-09 PROCEDURE — 93005 ELECTROCARDIOGRAM TRACING: CPT

## 2018-05-15 ENCOUNTER — OFFICE VISIT (OUTPATIENT)
Dept: FAMILY MEDICINE CLINIC | Facility: CLINIC | Age: 53
End: 2018-05-15

## 2018-05-15 VITALS
HEIGHT: 67 IN | SYSTOLIC BLOOD PRESSURE: 118 MMHG | BODY MASS INDEX: 36.88 KG/M2 | WEIGHT: 235 LBS | RESPIRATION RATE: 16 BRPM | DIASTOLIC BLOOD PRESSURE: 80 MMHG | TEMPERATURE: 98 F | HEART RATE: 76 BPM

## 2018-05-15 DIAGNOSIS — Z11.3 SCREENING EXAMINATION FOR STD (SEXUALLY TRANSMITTED DISEASE): ICD-10-CM

## 2018-05-15 DIAGNOSIS — E01.0 THYROMEGALY: ICD-10-CM

## 2018-05-15 DIAGNOSIS — Z00.00 ROUTINE HEALTH MAINTENANCE: Primary | ICD-10-CM

## 2018-05-15 PROCEDURE — 87591 N.GONORRHOEAE DNA AMP PROB: CPT | Performed by: FAMILY MEDICINE

## 2018-05-15 PROCEDURE — 99396 PREV VISIT EST AGE 40-64: CPT | Performed by: FAMILY MEDICINE

## 2018-05-15 PROCEDURE — 80053 COMPREHEN METABOLIC PANEL: CPT | Performed by: FAMILY MEDICINE

## 2018-05-15 PROCEDURE — 87491 CHLMYD TRACH DNA AMP PROBE: CPT | Performed by: FAMILY MEDICINE

## 2018-05-15 PROCEDURE — 86780 TREPONEMA PALLIDUM: CPT | Performed by: FAMILY MEDICINE

## 2018-05-15 PROCEDURE — 84443 ASSAY THYROID STIM HORMONE: CPT | Performed by: FAMILY MEDICINE

## 2018-05-15 PROCEDURE — 85027 COMPLETE CBC AUTOMATED: CPT | Performed by: FAMILY MEDICINE

## 2018-05-15 PROCEDURE — 87389 HIV-1 AG W/HIV-1&-2 AB AG IA: CPT | Performed by: FAMILY MEDICINE

## 2018-05-15 PROCEDURE — 84439 ASSAY OF FREE THYROXINE: CPT | Performed by: FAMILY MEDICINE

## 2018-05-15 PROCEDURE — 80061 LIPID PANEL: CPT | Performed by: FAMILY MEDICINE

## 2018-05-15 PROCEDURE — 36415 COLL VENOUS BLD VENIPUNCTURE: CPT | Performed by: FAMILY MEDICINE

## 2018-05-15 NOTE — PROGRESS NOTES
HPI:   Denia Grimes is a 46year old female who presents for a complete physical exam. Symptoms: denies discharge, itching, burning or dysuria.  Patient complains of having issues with her right shoulder for some time now, and was Dx with right rot HYDROcodone-acetaminophen (NORCO) 5-325 MG Oral Tab Take 1-2 tablets by mouth every 6 (six) hours as needed for Pain. Disp: 40 tablet Rfl: 0   POTASSIUM OR Take by mouth daily.  Disp:  Rfl:    Multiple Vitamins-Minerals (MULTI-VITAMIN/MINERALS) Oral Tab T ST  LUNGS: denies shortness of breath with exertion  CARDIOVASCULAR: denies chest pain on exertion  GI: denies abdominal pain,denies heartburn  : denies dysuria, vaginal discharge or itching,periods regular   MUSCULOSKELETAL: denies back pain  NEURO: den (sexually transmitted disease)      Orders Placed This Encounter      TSH and Free T4 [E]      CBC, Platelet, No Differential [E]      Comp Metabolic Panel (14) [E]      Lipid Panel [E]      HIV AG AB Combo [E]      T Pallidum Screening Reagan [E]      Ch

## 2018-05-15 NOTE — PROGRESS NOTES
2 gold, 1 mint, 1 lavender and 1 pink tube collected from L AC using straight needle and 1 attempt. Pt tolerated and was sent home in stable condition. Pt also provided urine specimen for testing as well.

## 2018-05-16 ENCOUNTER — TELEPHONE (OUTPATIENT)
Dept: FAMILY MEDICINE CLINIC | Facility: CLINIC | Age: 53
End: 2018-05-16

## 2018-05-16 NOTE — TELEPHONE ENCOUNTER
Pt was advised of results- verbalized understanding. Pt states she has not taken her thyroid medication in over 2 months. Per verbal conversation with Dr. Alina Bates - pt can remain off medication since thryroid is functioning properly.     Pt asked that we

## 2018-05-16 NOTE — TELEPHONE ENCOUNTER
----- Message from Anna Richmond DO sent at 5/16/2018  2:16 PM CDT -----  Can notify Renetta Oh that her STD testing was all negative  Her cholesterol looked good her, BS  kidney and liver function tests were good  Her blood count was normal and her thyroid

## 2018-05-21 NOTE — H&P
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    PATIENT ACCOUNT#:   [de-identified]    LOCATION:  OR   [facility]  MEDICAL RECORD #:   [id number]       YOB: 1965  ADMISSION DATE:       05/22/2018 elective right shoulder arthroscopy, acromioplasty, and cuff repair. Limitations, risks, complications, and postoperative scenarios are stressed.   The possibility of continued pain, stiffness, vascular compromise, DVT, infection, hemarthrosis, surgical fa

## 2018-05-30 ENCOUNTER — TELEPHONE (OUTPATIENT)
Dept: FAMILY MEDICINE CLINIC | Facility: CLINIC | Age: 53
End: 2018-05-30

## 2018-05-30 RX ORDER — ACETAMINOPHEN 500 MG
1000 TABLET ORAL ONCE
Status: CANCELLED | OUTPATIENT
Start: 2018-05-30 | End: 2018-05-30

## 2018-05-30 NOTE — TELEPHONE ENCOUNTER
PT was advised that order is in system- pt verblized understanding. I offered phone number to central scheduling but pt stated she already had it.

## 2018-05-30 NOTE — TELEPHONE ENCOUNTER
Pt requests that DS place an order for her yearly mammogram. Pls call when order placed so pt can call central sched

## 2018-06-05 ENCOUNTER — HOSPITAL ENCOUNTER (OUTPATIENT)
Dept: MAMMOGRAPHY | Age: 53
Discharge: HOME | End: 2018-06-05
Attending: FAMILY MEDICINE
Payer: MEDICAID

## 2018-06-05 DIAGNOSIS — Z00.00 ROUTINE HEALTH MAINTENANCE: ICD-10-CM

## 2018-06-05 PROCEDURE — 77067 SCR MAMMO BI INCL CAD: CPT | Performed by: FAMILY MEDICINE

## 2018-06-05 PROCEDURE — 77063 BREAST TOMOSYNTHESIS BI: CPT | Performed by: FAMILY MEDICINE

## 2018-06-15 NOTE — H&P
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    PATIENT ACCOUNT#:   [de-identified]    LOCATION:  OR   [facility]  MEDICAL RECORD #:   [id number]       YOB: 1965  ADMISSION DATE:       06/19/2018 scenarios are stressed. She is well counseled. She will go to surgery with informed consent.     Dictated By Alek Burdick M.D.  d: 06/14/2018 15:09:08  t: 06/14/2018 18:02:08  [facility] [id number]  IJ/

## 2018-06-19 ENCOUNTER — ANESTHESIA EVENT (OUTPATIENT)
Dept: SURGERY | Facility: HOSPITAL | Age: 53
End: 2018-06-19
Payer: MEDICAID

## 2018-06-19 ENCOUNTER — ANESTHESIA (OUTPATIENT)
Dept: SURGERY | Facility: HOSPITAL | Age: 53
End: 2018-06-19
Payer: MEDICAID

## 2018-06-19 ENCOUNTER — HOSPITAL ENCOUNTER (OUTPATIENT)
Facility: HOSPITAL | Age: 53
Setting detail: HOSPITAL OUTPATIENT SURGERY
Discharge: HOME | End: 2018-06-19
Attending: ORTHOPAEDIC SURGERY | Admitting: ORTHOPAEDIC SURGERY
Payer: MEDICAID

## 2018-06-19 ENCOUNTER — SURGERY (OUTPATIENT)
Age: 53
End: 2018-06-19

## 2018-06-19 VITALS
HEART RATE: 70 BPM | RESPIRATION RATE: 20 BRPM | SYSTOLIC BLOOD PRESSURE: 130 MMHG | HEIGHT: 68 IN | WEIGHT: 238.13 LBS | TEMPERATURE: 98 F | BODY MASS INDEX: 36.09 KG/M2 | DIASTOLIC BLOOD PRESSURE: 80 MMHG | OXYGEN SATURATION: 94 %

## 2018-06-19 DIAGNOSIS — E87.6 HYPOKALEMIA: Primary | ICD-10-CM

## 2018-06-19 DIAGNOSIS — M75.101 ROTATOR CUFF TEAR ARTHROPATHY OF RIGHT SHOULDER: ICD-10-CM

## 2018-06-19 DIAGNOSIS — M12.811 ROTATOR CUFF ARTHROPATHY, RIGHT: ICD-10-CM

## 2018-06-19 DIAGNOSIS — M12.811 ROTATOR CUFF TEAR ARTHROPATHY OF RIGHT SHOULDER: ICD-10-CM

## 2018-06-19 PROCEDURE — 81025 URINE PREGNANCY TEST: CPT | Performed by: ORTHOPAEDIC SURGERY

## 2018-06-19 PROCEDURE — 76942 ECHO GUIDE FOR BIOPSY: CPT | Performed by: ORTHOPAEDIC SURGERY

## 2018-06-19 PROCEDURE — 0RNJ4ZZ RELEASE RIGHT SHOULDER JOINT, PERCUTANEOUS ENDOSCOPIC APPROACH: ICD-10-PCS | Performed by: ORTHOPAEDIC SURGERY

## 2018-06-19 PROCEDURE — 0LQ14ZZ REPAIR RIGHT SHOULDER TENDON, PERCUTANEOUS ENDOSCOPIC APPROACH: ICD-10-PCS | Performed by: ORTHOPAEDIC SURGERY

## 2018-06-19 DEVICE — SYSTEM ESCP FX 19.1MM 4.75MM: Type: IMPLANTABLE DEVICE | Site: SHOULDER | Status: FUNCTIONAL

## 2018-06-19 RX ORDER — ACETAMINOPHEN 500 MG
1000 TABLET ORAL ONCE
COMMUNITY
End: 2019-06-10 | Stop reason: ALTCHOICE

## 2018-06-19 RX ORDER — SODIUM CHLORIDE, SODIUM LACTATE, POTASSIUM CHLORIDE, CALCIUM CHLORIDE 600; 310; 30; 20 MG/100ML; MG/100ML; MG/100ML; MG/100ML
INJECTION, SOLUTION INTRAVENOUS CONTINUOUS
Status: DISCONTINUED | OUTPATIENT
Start: 2018-06-19 | End: 2018-06-19

## 2018-06-19 RX ORDER — MEPERIDINE HYDROCHLORIDE 25 MG/ML
12.5 INJECTION INTRAMUSCULAR; INTRAVENOUS; SUBCUTANEOUS AS NEEDED
Status: DISCONTINUED | OUTPATIENT
Start: 2018-06-19 | End: 2018-06-19

## 2018-06-19 RX ORDER — CEFAZOLIN SODIUM/WATER 2 G/20 ML
2 SYRINGE (ML) INTRAVENOUS ONCE
Status: COMPLETED | OUTPATIENT
Start: 2018-06-19 | End: 2018-06-19

## 2018-06-19 RX ORDER — MIDAZOLAM HYDROCHLORIDE 1 MG/ML
1 INJECTION INTRAMUSCULAR; INTRAVENOUS EVERY 5 MIN PRN
Status: DISCONTINUED | OUTPATIENT
Start: 2018-06-19 | End: 2018-06-19

## 2018-06-19 RX ORDER — HYDROMORPHONE HYDROCHLORIDE 1 MG/ML
0.4 INJECTION, SOLUTION INTRAMUSCULAR; INTRAVENOUS; SUBCUTANEOUS EVERY 5 MIN PRN
Status: DISCONTINUED | OUTPATIENT
Start: 2018-06-19 | End: 2018-06-19

## 2018-06-19 RX ORDER — HYDROCODONE BITARTRATE AND ACETAMINOPHEN 5; 325 MG/1; MG/1
2 TABLET ORAL AS NEEDED
Status: DISCONTINUED | OUTPATIENT
Start: 2018-06-19 | End: 2018-06-19

## 2018-06-19 RX ORDER — SCOLOPAMINE TRANSDERMAL SYSTEM 1 MG/1
1 PATCH, EXTENDED RELEASE TRANSDERMAL ONCE
Status: DISCONTINUED | OUTPATIENT
Start: 2018-06-19 | End: 2018-06-19

## 2018-06-19 RX ORDER — NALOXONE HYDROCHLORIDE 0.4 MG/ML
80 INJECTION, SOLUTION INTRAMUSCULAR; INTRAVENOUS; SUBCUTANEOUS AS NEEDED
Status: DISCONTINUED | OUTPATIENT
Start: 2018-06-19 | End: 2018-06-19

## 2018-06-19 RX ORDER — HYDROCODONE BITARTRATE AND ACETAMINOPHEN 5; 325 MG/1; MG/1
1 TABLET ORAL AS NEEDED
Status: DISCONTINUED | OUTPATIENT
Start: 2018-06-19 | End: 2018-06-19

## 2018-06-19 RX ORDER — ONDANSETRON 2 MG/ML
4 INJECTION INTRAMUSCULAR; INTRAVENOUS AS NEEDED
Status: DISCONTINUED | OUTPATIENT
Start: 2018-06-19 | End: 2018-06-19

## 2018-06-19 RX ORDER — BUPIVACAINE HYDROCHLORIDE AND EPINEPHRINE 5; 5 MG/ML; UG/ML
INJECTION, SOLUTION EPIDURAL; INTRACAUDAL; PERINEURAL AS NEEDED
Status: DISCONTINUED | OUTPATIENT
Start: 2018-06-19 | End: 2018-06-19 | Stop reason: HOSPADM

## 2018-06-19 NOTE — ANESTHESIA POSTPROCEDURE EVALUATION
[facility] Liliam Mayes Patient Status:  Hospital Outpatient Surgery   Age/Gender 46year old female MRN [id number]   Location [address] [facility] Attending No att. providers found   Hosp Day # 0 PCP Yasmani Pedroza

## 2018-06-19 NOTE — INTERVAL H&P NOTE
Pre-op Diagnosis: Rotator cuff arthropathy, right [M12.811]    The above referenced H&P was reviewed by Alice Gonzalez MD on 6/19/2018, the patient was examined and no significant changes have occurred in the patient's condition since the H&P was perform

## 2018-06-19 NOTE — ANESTHESIA PREPROCEDURE EVALUATION
PRE-OP EVALUATION    Patient Name: Lena Metzger [facility]    Pre-op Diagnosis: Rotator cuff arthropathy, right [M12.811]    Procedure(s):  ARTHROSCOPIC ACROMIOPLASTY ROTATOR CUFF REPAIR RIGHT SHOULDER    Surgeon(s) and Role:     * Monty Amin MD - Betzaida Koenig Smoker  0.10 Packs/day  For 3.00 Years     Quit date: 4/23/1993    Smokeless tobacco: Never Used    Alcohol use Yes  0.0 - 1.2 oz/week        Drug use: No     Available pre-op labs reviewed.     Lab Results  Component Value Date   WBC 6.9 05/15/2018   RBC 5

## 2018-06-19 NOTE — OPERATIVE REPORT
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 OPERATING PHYSICIAN: Elmira Charles M.D.    PATIENT ACCOUNT#:   [de-identified]    LOCATION:  PACU [address] [place] [address] PACU 4 EDWP 10  MEDICAL RECORD #:   [id number]       DA outlet. The subscapularis is normal.  The articular surface is without disease. There is full-thickness supraspinatus tear. Outside-in lateral portal is established. Labral and biceps debridement follows.   Functionally, this is a full-thickness tear, a

## 2018-06-19 NOTE — BRIEF OP NOTE
Pre-Operative Diagnosis: Rotator cuff arthropathy, right [M12.811]     Post-Operative Diagnosis: Rotator cuff arthropathy, right [M12.811]      Procedure Performed:   Procedure(s):  ARTHROSCOPIC ACROMIOPLASTY ROTATOR CUFF REPAIR RIGHT SHOULDER    Surgeon(s

## 2018-07-19 ENCOUNTER — HOSPITAL ENCOUNTER (OUTPATIENT)
Dept: PHYSICAL THERAPY | Facility: HOSPITAL | Age: 53
Setting detail: THERAPIES SERIES
Discharge: HOME | End: 2018-07-19
Attending: NURSE PRACTITIONER
Payer: MEDICAID

## 2018-07-19 DIAGNOSIS — M75.121 COMPLETE TEAR OF RIGHT ROTATOR CUFF: ICD-10-CM

## 2018-07-19 PROCEDURE — 97110 THERAPEUTIC EXERCISES: CPT

## 2018-07-19 PROCEDURE — 97163 PT EVAL HIGH COMPLEX 45 MIN: CPT

## 2018-07-19 NOTE — PROGRESS NOTES
POST-OP SHOULDER EVALUATION:   Referring Physician: Mr. Leobardo Ferris  Diagnosis: R rotator cuff tear s/p     Date of Service: 7/19/2018     PATIENT SUMMARY   Sienna Pulliam is a 46year old y/o female who presents to therapy today s/p R RCR on 6/19/18 wi Patient education provided on use of ice, sleeping positions.    Patient was instructed in and issued a HEP for: pendulum x10 all directions, elbow flexion/extension x10, AAROM flexion supine with cane x5-elevated UE    Charges: PT Eval High Complexity, Levon sign and return this letter via fax as soon as possible to [phone]. If you have any questions, please contact me at Dept: [phone]    Sincerely,  Electronically signed by therapist: Susie Gonzalez PT    Physician's certification required:  Yes

## 2018-07-24 ENCOUNTER — HOSPITAL ENCOUNTER (OUTPATIENT)
Dept: PHYSICAL THERAPY | Facility: HOSPITAL | Age: 53
Setting detail: THERAPIES SERIES
Discharge: HOME | End: 2018-07-24
Attending: NURSE PRACTITIONER
Payer: MEDICAID

## 2018-07-24 PROCEDURE — 97110 THERAPEUTIC EXERCISES: CPT

## 2018-07-24 PROCEDURE — 97140 MANUAL THERAPY 1/> REGIONS: CPT

## 2018-07-24 NOTE — PROGRESS NOTES
Dx: R rotator cuff tear s/p         Authorized # of Visits:  8         Next MD visit: none scheduled  Fall Risk: standard         Precautions: n/a             Subjective: Pain at rest 3/10, with movement 153/100.  Vacuumed over the weekend and doing other a instrument assisted soft tissue mobilization. Modalities: e-stim, heat/cold for pain relief. Date: 7/24/2018  Tx#: 2/8 Date: Tx#: 3/ Date: Tx#: 4/ Date: Tx#: 5/ Date: Tx#: 6/ Date: Tx#: 7/ Date:    Tx#: 8/   [place] 5 min         MAYTE laws

## 2018-07-26 ENCOUNTER — APPOINTMENT (OUTPATIENT)
Dept: PHYSICAL THERAPY | Facility: HOSPITAL | Age: 53
End: 2018-07-26
Attending: NURSE PRACTITIONER
Payer: MEDICAID

## 2018-07-30 ENCOUNTER — HOSPITAL ENCOUNTER (OUTPATIENT)
Dept: PHYSICAL THERAPY | Facility: HOSPITAL | Age: 53
Setting detail: THERAPIES SERIES
Discharge: HOME | End: 2018-07-30
Attending: NURSE PRACTITIONER
Payer: MEDICAID

## 2018-07-30 PROCEDURE — 97110 THERAPEUTIC EXERCISES: CPT

## 2018-07-30 PROCEDURE — 97140 MANUAL THERAPY 1/> REGIONS: CPT

## 2018-07-30 NOTE — PROGRESS NOTES
Dx: R rotator cuff tear s/p         Authorized # of Visits:  8         Next MD visit: none scheduled  Fall Risk: standard         Precautions: n/a             Subjective: Pain at rest 0-8/10.  Still sleeping in recliner but able to start to move arm a littl posture education/training, scapular and core stabilization. Manual therapy to include: joint mobilizations to restore normal joint mechanics and decrease pain, instrument assisted soft tissue mobilization. Modalities: e-stim, heat/cold for pain relief.  P

## 2018-07-31 ENCOUNTER — HOSPITAL ENCOUNTER (OUTPATIENT)
Dept: ULTRASOUND IMAGING | Age: 53
Discharge: HOME | End: 2018-07-31
Attending: INTERNAL MEDICINE
Payer: MEDICAID

## 2018-07-31 ENCOUNTER — HOSPITAL ENCOUNTER (OUTPATIENT)
Dept: PHYSICAL THERAPY | Facility: HOSPITAL | Age: 53
Setting detail: THERAPIES SERIES
Discharge: HOME | End: 2018-07-31
Attending: NURSE PRACTITIONER
Payer: MEDICAID

## 2018-07-31 DIAGNOSIS — E01.0 THYROMEGALY: ICD-10-CM

## 2018-07-31 PROCEDURE — 97140 MANUAL THERAPY 1/> REGIONS: CPT

## 2018-07-31 PROCEDURE — 97110 THERAPEUTIC EXERCISES: CPT

## 2018-07-31 PROCEDURE — 76536 US EXAM OF HEAD AND NECK: CPT | Performed by: INTERNAL MEDICINE

## 2018-07-31 NOTE — PROGRESS NOTES
Dx: R rotator cuff tear s/p         Authorized # of Visits:  8         Next MD visit: none scheduled  Fall Risk: standard         Precautions: n/a             Subjective: Pain at rest 0-8/10.  Still sleeping in recliner but able to start to move arm a littl achieved in PT (12 visits)    Plan:Pt considered discharged/placed on hold from physical therapy. Pt plans to continue to pursue insurance options.  Pt confident to continue present home exercise program independently and therapist offered her number for a MHP during stretches MHP during stretches           Charges: TEx2, MT, MHP ([place])  Total Timed Treatment: 45 min     Total Treatment Time: 45 min

## 2018-08-01 ENCOUNTER — APPOINTMENT (OUTPATIENT)
Dept: PHYSICAL THERAPY | Facility: HOSPITAL | Age: 53
End: 2018-08-01
Attending: NURSE PRACTITIONER
Payer: MEDICAID

## 2018-08-06 ENCOUNTER — APPOINTMENT (OUTPATIENT)
Dept: PHYSICAL THERAPY | Facility: HOSPITAL | Age: 53
End: 2018-08-06
Attending: NURSE PRACTITIONER
Payer: MEDICAID

## 2018-08-08 ENCOUNTER — APPOINTMENT (OUTPATIENT)
Dept: PHYSICAL THERAPY | Facility: HOSPITAL | Age: 53
End: 2018-08-08
Attending: NURSE PRACTITIONER
Payer: MEDICAID

## 2018-08-15 ENCOUNTER — APPOINTMENT (OUTPATIENT)
Dept: PHYSICAL THERAPY | Facility: HOSPITAL | Age: 53
End: 2018-08-15
Attending: NURSE PRACTITIONER
Payer: MEDICAID

## 2019-06-06 NOTE — LETTER
12/13/19  Sienna BOYLE Convey  [address] [place]  [address] [address]      Dear [facility]. To help us provide the highest quality medical care, [facility] uses a sophisticated computer system to track our patient records.  During 11

## 2019-06-10 ENCOUNTER — OFFICE VISIT (OUTPATIENT)
Dept: FAMILY MEDICINE CLINIC | Facility: CLINIC | Age: 54
End: 2019-06-10

## 2019-06-10 VITALS
HEIGHT: 67 IN | SYSTOLIC BLOOD PRESSURE: 112 MMHG | TEMPERATURE: 98 F | DIASTOLIC BLOOD PRESSURE: 80 MMHG | WEIGHT: 216 LBS | BODY MASS INDEX: 33.9 KG/M2 | HEART RATE: 72 BPM | RESPIRATION RATE: 18 BRPM

## 2019-06-10 DIAGNOSIS — E01.0 THYROMEGALY: Primary | ICD-10-CM

## 2019-06-10 DIAGNOSIS — K21.00 GASTROESOPHAGEAL REFLUX DISEASE WITH ESOPHAGITIS: ICD-10-CM

## 2019-06-10 DIAGNOSIS — M79.10 MYALGIA: ICD-10-CM

## 2019-06-10 DIAGNOSIS — R19.8 GLOBUS SENSATION: ICD-10-CM

## 2019-06-10 DIAGNOSIS — M79.7 FIBROMYALGIA: ICD-10-CM

## 2019-06-10 DIAGNOSIS — Z00.00 ROUTINE HEALTH MAINTENANCE: ICD-10-CM

## 2019-06-10 PROBLEM — R09.89 GLOBUS SENSATION: Status: ACTIVE | Noted: 2019-06-10

## 2019-06-10 PROBLEM — R09.A2 GLOBUS SENSATION: Status: ACTIVE | Noted: 2019-06-10

## 2019-06-10 PROCEDURE — 84439 ASSAY OF FREE THYROXINE: CPT | Performed by: FAMILY MEDICINE

## 2019-06-10 PROCEDURE — 85025 COMPLETE CBC W/AUTO DIFF WBC: CPT | Performed by: FAMILY MEDICINE

## 2019-06-10 PROCEDURE — 80053 COMPREHEN METABOLIC PANEL: CPT | Performed by: FAMILY MEDICINE

## 2019-06-10 PROCEDURE — 99214 OFFICE O/P EST MOD 30 MIN: CPT | Performed by: FAMILY MEDICINE

## 2019-06-10 PROCEDURE — 80061 LIPID PANEL: CPT | Performed by: FAMILY MEDICINE

## 2019-06-10 PROCEDURE — 84481 FREE ASSAY (FT-3): CPT | Performed by: FAMILY MEDICINE

## 2019-06-10 PROCEDURE — 84443 ASSAY THYROID STIM HORMONE: CPT | Performed by: FAMILY MEDICINE

## 2019-06-10 PROCEDURE — 83874 ASSAY OF MYOGLOBIN: CPT | Performed by: FAMILY MEDICINE

## 2019-06-10 PROCEDURE — 82085 ASSAY OF ALDOLASE: CPT | Performed by: FAMILY MEDICINE

## 2019-06-10 RX ORDER — VIT B1 MN/B2/B3/B5/B6/B12/C/FA 18-250-400
TABLET ORAL
COMMUNITY
End: 2019-08-07 | Stop reason: ALTCHOICE

## 2019-06-10 NOTE — PROGRESS NOTES
Ty Zazueta is a 48year old female. HPI:   Michael Vinnie is here for evaluation of a number of issues not the least of which facial pain , her neck, her anterior neck muscles at times her salivary glands get swollen as well, and her mouth gets dry. Drug use: No       REVIEW OF SYSTEMS:   GENERAL HEALTH: feels well otherwise  SKIN: denies any unusual skin lesions or rashes  RESPIRATORY: denies shortness of breath with exertion  CARDIOVASCULAR: denies chest pain on exertion  GI: denies abdominal pain a

## 2019-06-12 ENCOUNTER — TELEPHONE (OUTPATIENT)
Dept: FAMILY MEDICINE CLINIC | Facility: CLINIC | Age: 54
End: 2019-06-12

## 2019-06-12 RX ORDER — AMITRIPTYLINE HYDROCHLORIDE 25 MG/1
25 TABLET, FILM COATED ORAL NIGHTLY
Qty: 30 TABLET | Refills: 5 | Status: SHIPPED | OUTPATIENT
Start: 2019-06-12 | End: 2019-06-12

## 2019-06-12 RX ORDER — AMITRIPTYLINE HYDROCHLORIDE 25 MG/1
25 TABLET, FILM COATED ORAL NIGHTLY
Qty: 30 TABLET | Refills: 5 | Status: SHIPPED | OUTPATIENT
Start: 2019-06-12 | End: 2019-07-12

## 2019-06-12 NOTE — TELEPHONE ENCOUNTER
Future Appointments   Date Time Provider Karly Recinos   8/7/2019  9:00 AM Gilberto Catalan, [facility] [place]     Pt advised of results- verbalized understanding    Please send amitryptilline to [facility] on [address] [place]

## 2019-06-12 NOTE — TELEPHONE ENCOUNTER
----- Message from Judd Wilburn DO sent at 6/12/2019 11:54 AM CDT -----  Notify Fany Patino  labs looked very good lipids were  Excellent kidney, liver function, blood sugar and thyroid were all normal. And her muscle enzyme tests were normal as

## 2019-08-07 ENCOUNTER — OFFICE VISIT (OUTPATIENT)
Dept: FAMILY MEDICINE CLINIC | Facility: CLINIC | Age: 54
End: 2019-08-07

## 2019-08-07 VITALS
SYSTOLIC BLOOD PRESSURE: 110 MMHG | RESPIRATION RATE: 16 BRPM | BODY MASS INDEX: 34.63 KG/M2 | HEART RATE: 80 BPM | HEIGHT: 67 IN | TEMPERATURE: 99 F | DIASTOLIC BLOOD PRESSURE: 80 MMHG | WEIGHT: 220.63 LBS

## 2019-08-07 DIAGNOSIS — R92.2 DENSE BREASTS: ICD-10-CM

## 2019-08-07 DIAGNOSIS — Z12.39 BREAST CANCER SCREENING: ICD-10-CM

## 2019-08-07 DIAGNOSIS — Z00.00 ROUTINE HEALTH MAINTENANCE: Primary | ICD-10-CM

## 2019-08-07 DIAGNOSIS — E04.2 MULTIPLE THYROID NODULES: ICD-10-CM

## 2019-08-07 PROCEDURE — 87624 HPV HI-RISK TYP POOLED RSLT: CPT | Performed by: FAMILY MEDICINE

## 2019-08-07 PROCEDURE — G0438 PPPS, INITIAL VISIT: HCPCS | Performed by: FAMILY MEDICINE

## 2019-08-07 PROCEDURE — 87625 HPV TYPES 16 & 18 ONLY: CPT | Performed by: FAMILY MEDICINE

## 2019-08-07 PROCEDURE — 99396 PREV VISIT EST AGE 40-64: CPT | Performed by: FAMILY MEDICINE

## 2019-08-07 PROCEDURE — 88175 CYTOPATH C/V AUTO FLUID REDO: CPT | Performed by: FAMILY MEDICINE

## 2019-08-12 LAB
HPV I/H RISK 1 DNA SPEC QL NAA+PROBE: POSITIVE
LAST PAP RESULT: NORMAL
PAP HISTORY (OTHER THAN LAST PAP): NORMAL

## 2019-08-13 LAB
HPV16 DNA CVX QL PROBE+SIG AMP: NEGATIVE
HPV18 DNA CVX QL PROBE+SIG AMP: NEGATIVE

## 2019-08-14 ENCOUNTER — TELEPHONE (OUTPATIENT)
Dept: FAMILY MEDICINE CLINIC | Facility: CLINIC | Age: 54
End: 2019-08-14

## 2019-08-14 DIAGNOSIS — Z11.3 SCREENING FOR STD (SEXUALLY TRANSMITTED DISEASE): Primary | ICD-10-CM

## 2019-08-14 NOTE — TELEPHONE ENCOUNTER
Pt would like STD bloodwork testing order placed- please place orders    Future Appointments   Date Time Provider Karly Recinos   8/23/2019  2:00 PM  [facility] [address] [facility] [place]     Pt also asked about HPV 16 and 18 testing.   RN advised those

## 2019-08-14 NOTE — TELEPHONE ENCOUNTER
----- Message from Rachel Romo DO sent at 8/14/2019  1:56 PM CDT -----  Notified of results her PAP cytology was negative but her HPV was positive, so we need to recall PAP in 1 year

## 2019-08-14 NOTE — TELEPHONE ENCOUNTER
Pt got a call from DS about her Test Results, She has a couple follow up question for the nurse, She wants to talk to nurse about her blood work from June. Also would like to talk to nurse about HPV 16 and 18. Pt's phone is cutting in and out.    Please r

## 2019-08-23 ENCOUNTER — NURSE ONLY (OUTPATIENT)
Dept: FAMILY MEDICINE CLINIC | Facility: CLINIC | Age: 54
End: 2019-08-23

## 2019-08-23 DIAGNOSIS — Z11.3 SCREENING FOR STD (SEXUALLY TRANSMITTED DISEASE): ICD-10-CM

## 2019-08-23 LAB — T PALLIDUM AB SER QL IA: NONREACTIVE

## 2019-08-23 PROCEDURE — 87491 CHLMYD TRACH DNA AMP PROBE: CPT | Performed by: FAMILY MEDICINE

## 2019-08-23 PROCEDURE — 87591 N.GONORRHOEAE DNA AMP PROB: CPT | Performed by: FAMILY MEDICINE

## 2019-08-23 PROCEDURE — 86780 TREPONEMA PALLIDUM: CPT | Performed by: FAMILY MEDICINE

## 2019-08-23 PROCEDURE — 87389 HIV-1 AG W/HIV-1&-2 AB AG IA: CPT | Performed by: FAMILY MEDICINE

## 2019-08-23 NOTE — PROGRESS NOTES
Mili Trejo presents today for nurse visit. Labs ordered by Dr Alina Bates.  2 gold, 1 pink drawn from left ac area with 1 attempt with straight needle. Urine collected. Patient tolerated well. Left office in stable condition.

## 2019-08-24 ENCOUNTER — TELEPHONE (OUTPATIENT)
Dept: FAMILY MEDICINE CLINIC | Facility: CLINIC | Age: 54
End: 2019-08-24

## 2019-08-24 NOTE — TELEPHONE ENCOUNTER
----- Message from Yasmani Pedroza DO sent at 8/24/2019  6:29 AM CDT -----  Can notify Sienna her tests all came back negative

## 2019-08-26 LAB
C TRACH DNA SPEC QL NAA+PROBE: NEGATIVE
N GONORRHOEA DNA SPEC QL NAA+PROBE: NEGATIVE

## 2019-09-09 ENCOUNTER — TELEPHONE (OUTPATIENT)
Dept: FAMILY MEDICINE CLINIC | Facility: CLINIC | Age: 54
End: 2019-09-09

## 2019-09-10 ENCOUNTER — APPOINTMENT (OUTPATIENT)
Dept: GENERAL RADIOLOGY | Facility: HOSPITAL | Age: 54
DRG: 481 | End: 2019-09-10
Attending: EMERGENCY MEDICINE
Payer: COMMERCIAL

## 2019-09-10 ENCOUNTER — APPOINTMENT (OUTPATIENT)
Dept: CT IMAGING | Facility: HOSPITAL | Age: 54
DRG: 481 | End: 2019-09-10
Attending: EMERGENCY MEDICINE
Payer: COMMERCIAL

## 2019-09-10 ENCOUNTER — APPOINTMENT (OUTPATIENT)
Dept: GENERAL RADIOLOGY | Facility: HOSPITAL | Age: 54
DRG: 481 | End: 2019-09-10
Attending: ORTHOPAEDIC SURGERY
Payer: COMMERCIAL

## 2019-09-10 ENCOUNTER — HOSPITAL ENCOUNTER (INPATIENT)
Facility: HOSPITAL | Age: 54
LOS: 7 days | Discharge: NURSING, REHAB OR LONG-TERM CARE | DRG: 481 | End: 2019-09-17
Attending: EMERGENCY MEDICINE | Admitting: INTERNAL MEDICINE
Payer: COMMERCIAL

## 2019-09-10 ENCOUNTER — ANESTHESIA (OUTPATIENT)
Dept: SURGERY | Facility: HOSPITAL | Age: 54
End: 2019-09-10

## 2019-09-10 ENCOUNTER — ANESTHESIA EVENT (OUTPATIENT)
Dept: SURGERY | Facility: HOSPITAL | Age: 54
End: 2019-09-10
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
## 2019-09-10 DIAGNOSIS — S72.301A CLOSED FRACTURE OF SHAFT OF RIGHT FEMUR, UNSPECIFIED FRACTURE MORPHOLOGY, INITIAL ENCOUNTER (HCC): Primary | ICD-10-CM

## 2019-09-10 LAB
ALBUMIN SERPL-MCNC: 2.7 G/DL (ref 3.4–5)
ALBUMIN/GLOB SERPL: 0.8 {RATIO} (ref 1–2)
ALP LIVER SERPL-CCNC: 75 U/L (ref 41–108)
ALT SERPL-CCNC: 22 U/L (ref 13–56)
AMYLASE SERPL-CCNC: 51 U/L (ref 25–115)
ANION GAP SERPL CALC-SCNC: 8 MMOL/L (ref 0–18)
ANTIBODY SCREEN: NEGATIVE
APTT PPP: 27.7 SECONDS (ref 25.4–36.1)
B-HCG SERPL-ACNC: 4 MIU/ML
BASOPHILS # BLD AUTO: 0.09 X10(3) UL (ref 0–0.2)
BASOPHILS NFR BLD AUTO: 0.6 %
BILIRUB SERPL-MCNC: 0.3 MG/DL (ref 0.1–2)
BUN BLD-MCNC: 14 MG/DL (ref 7–18)
BUN/CREAT SERPL: 24.1 (ref 10–20)
CALCIUM BLD-MCNC: 6.7 MG/DL (ref 8.5–10.1)
CHLORIDE SERPL-SCNC: 119 MMOL/L (ref 98–112)
CO2 SERPL-SCNC: 19 MMOL/L (ref 21–32)
CREAT BLD-MCNC: 0.58 MG/DL (ref 0.55–1.02)
DEPRECATED RDW RBC AUTO: 45.6 FL (ref 35.1–46.3)
EOSINOPHIL # BLD AUTO: 0.4 X10(3) UL (ref 0–0.7)
EOSINOPHIL NFR BLD AUTO: 2.6 %
ERYTHROCYTE [DISTWIDTH] IN BLOOD BY AUTOMATED COUNT: 13.9 % (ref 11–15)
ETHANOL SERPL-MCNC: <3 MG/DL (ref ?–3)
GLOBULIN PLAS-MCNC: 3.3 G/DL (ref 2.8–4.4)
GLUCOSE BLD-MCNC: 93 MG/DL (ref 70–99)
HCT VFR BLD AUTO: 41.7 % (ref 35–48)
HGB BLD-MCNC: 13.4 G/DL (ref 12–16)
IMM GRANULOCYTES # BLD AUTO: 0.27 X10(3) UL (ref 0–1)
IMM GRANULOCYTES NFR BLD: 1.8 %
INR BLD: 0.97 (ref 0.9–1.1)
LIPASE SERPL-CCNC: 124 U/L (ref 73–393)
LYMPHOCYTES # BLD AUTO: 5.52 X10(3) UL (ref 1–4)
LYMPHOCYTES NFR BLD AUTO: 36.6 %
M PROTEIN MFR SERPL ELPH: 6 G/DL (ref 6.4–8.2)
MCH RBC QN AUTO: 28.3 PG (ref 26–34)
MCHC RBC AUTO-ENTMCNC: 32.1 G/DL (ref 31–37)
MCV RBC AUTO: 88.2 FL (ref 80–100)
MONOCYTES # BLD AUTO: 1.08 X10(3) UL (ref 0.1–1)
MONOCYTES NFR BLD AUTO: 7.2 %
NEUTROPHILS # BLD AUTO: 7.74 X10 (3) UL (ref 1.5–7.7)
NEUTROPHILS # BLD AUTO: 7.74 X10(3) UL (ref 1.5–7.7)
NEUTROPHILS NFR BLD AUTO: 51.2 %
OSMOLALITY SERPL CALC.SUM OF ELEC: 302 MOSM/KG (ref 275–295)
PLATELET # BLD AUTO: 408 10(3)UL (ref 150–450)
POTASSIUM SERPL-SCNC: 4.5 MMOL/L (ref 3.5–5.1)
PSA SERPL DL<=0.01 NG/ML-MCNC: 13.3 SECONDS (ref 12.5–14.7)
RBC # BLD AUTO: 4.73 X10(6)UL (ref 3.8–5.3)
RH BLOOD TYPE: POSITIVE
SODIUM SERPL-SCNC: 146 MMOL/L (ref 136–145)
TROPONIN I SERPL-MCNC: <0.045 NG/ML (ref ?–0.04)
WBC # BLD AUTO: 15.1 X10(3) UL (ref 4–11)

## 2019-09-10 PROCEDURE — 72125 CT NECK SPINE W/O DYE: CPT | Performed by: EMERGENCY MEDICINE

## 2019-09-10 PROCEDURE — 74174 CTA ABD&PLVS W/CONTRAST: CPT | Performed by: EMERGENCY MEDICINE

## 2019-09-10 PROCEDURE — 3E0T3BZ INTRODUCTION OF ANESTHETIC AGENT INTO PERIPHERAL NERVES AND PLEXI, PERCUTANEOUS APPROACH: ICD-10-PCS | Performed by: ANESTHESIOLOGY

## 2019-09-10 PROCEDURE — 71275 CT ANGIOGRAPHY CHEST: CPT | Performed by: EMERGENCY MEDICINE

## 2019-09-10 PROCEDURE — 72170 X-RAY EXAM OF PELVIS: CPT | Performed by: EMERGENCY MEDICINE

## 2019-09-10 PROCEDURE — 70450 CT HEAD/BRAIN W/O DYE: CPT | Performed by: EMERGENCY MEDICINE

## 2019-09-10 PROCEDURE — 73552 X-RAY EXAM OF FEMUR 2/>: CPT | Performed by: EMERGENCY MEDICINE

## 2019-09-10 PROCEDURE — 71045 X-RAY EXAM CHEST 1 VIEW: CPT | Performed by: EMERGENCY MEDICINE

## 2019-09-10 PROCEDURE — 99223 1ST HOSP IP/OBS HIGH 75: CPT | Performed by: INTERNAL MEDICINE

## 2019-09-10 PROCEDURE — 0QH806Z INSERTION OF INTRAMEDULLARY INTERNAL FIXATION DEVICE INTO RIGHT FEMORAL SHAFT, OPEN APPROACH: ICD-10-PCS | Performed by: ORTHOPAEDIC SURGERY

## 2019-09-10 PROCEDURE — 76000 FLUOROSCOPY <1 HR PHYS/QHP: CPT | Performed by: ORTHOPAEDIC SURGERY

## 2019-09-10 DEVICE — SCREW CORT FA 5.0X45 Z NAIL: Type: IMPLANTABLE DEVICE | Site: HIP | Status: FUNCTIONAL

## 2019-09-10 DEVICE — ZNN CMN LAG SCREW 10.5X85
Type: IMPLANTABLE DEVICE | Site: HIP | Status: FUNCTIONAL
Brand: ZIMMER® NATURAL NAIL® SYSTEM

## 2019-09-10 DEVICE — ZNN CMN NAIL 11.5MMX38CM 130 R
Type: IMPLANTABLE DEVICE | Site: HIP | Status: FUNCTIONAL
Brand: ZIMMER® NATURAL NAIL® SYSTEM

## 2019-09-10 RX ORDER — HYDROMORPHONE HYDROCHLORIDE 1 MG/ML
1 INJECTION, SOLUTION INTRAMUSCULAR; INTRAVENOUS; SUBCUTANEOUS EVERY 30 MIN PRN
Status: DISCONTINUED | OUTPATIENT
Start: 2019-09-10 | End: 2019-09-17 | Stop reason: HOSPADM

## 2019-09-10 RX ORDER — SODIUM CHLORIDE 9 MG/ML
INJECTION, SOLUTION INTRAVENOUS CONTINUOUS
Status: DISCONTINUED | OUTPATIENT
Start: 2019-09-10 | End: 2019-09-17

## 2019-09-10 RX ORDER — HYDROMORPHONE HYDROCHLORIDE 1 MG/ML
0.2 INJECTION, SOLUTION INTRAMUSCULAR; INTRAVENOUS; SUBCUTANEOUS EVERY 2 HOUR PRN
Status: DISCONTINUED | OUTPATIENT
Start: 2019-09-10 | End: 2019-09-17

## 2019-09-10 RX ORDER — SODIUM CHLORIDE, SODIUM LACTATE, POTASSIUM CHLORIDE, CALCIUM CHLORIDE 600; 310; 30; 20 MG/100ML; MG/100ML; MG/100ML; MG/100ML
INJECTION, SOLUTION INTRAVENOUS CONTINUOUS
Status: DISCONTINUED | OUTPATIENT
Start: 2019-09-10 | End: 2019-09-10 | Stop reason: HOSPADM

## 2019-09-10 RX ORDER — LORAZEPAM 2 MG/ML
1 INJECTION INTRAMUSCULAR ONCE
Status: DISCONTINUED | OUTPATIENT
Start: 2019-09-10 | End: 2019-09-10

## 2019-09-10 RX ORDER — HYDROMORPHONE HYDROCHLORIDE 1 MG/ML
0.4 INJECTION, SOLUTION INTRAMUSCULAR; INTRAVENOUS; SUBCUTANEOUS EVERY 5 MIN PRN
Status: DISCONTINUED | OUTPATIENT
Start: 2019-09-10 | End: 2019-09-10 | Stop reason: HOSPADM

## 2019-09-10 RX ORDER — LORAZEPAM 2 MG/ML
2 INJECTION INTRAMUSCULAR ONCE
Status: COMPLETED | OUTPATIENT
Start: 2019-09-10 | End: 2019-09-10

## 2019-09-10 RX ORDER — ONDANSETRON 2 MG/ML
4 INJECTION INTRAMUSCULAR; INTRAVENOUS AS NEEDED
Status: DISCONTINUED | OUTPATIENT
Start: 2019-09-10 | End: 2019-09-10 | Stop reason: HOSPADM

## 2019-09-10 RX ORDER — LORAZEPAM 2 MG/ML
INJECTION INTRAMUSCULAR
Status: DISPENSED
Start: 2019-09-10 | End: 2019-09-10

## 2019-09-10 RX ORDER — CEFAZOLIN SODIUM 1 G/3ML
INJECTION, POWDER, FOR SOLUTION INTRAMUSCULAR; INTRAVENOUS
Status: DISCONTINUED | OUTPATIENT
Start: 2019-09-10 | End: 2019-09-10 | Stop reason: HOSPADM

## 2019-09-10 RX ORDER — BISACODYL 10 MG
10 SUPPOSITORY, RECTAL RECTAL
Status: DISCONTINUED | OUTPATIENT
Start: 2019-09-10 | End: 2019-09-17

## 2019-09-10 RX ORDER — POLYETHYLENE GLYCOL 3350 17 G/17G
1 POWDER, FOR SOLUTION ORAL DAILY PRN
Status: DISCONTINUED | OUTPATIENT
Start: 2019-09-10 | End: 2019-09-17

## 2019-09-10 RX ORDER — NALOXONE HYDROCHLORIDE 0.4 MG/ML
80 INJECTION, SOLUTION INTRAMUSCULAR; INTRAVENOUS; SUBCUTANEOUS AS NEEDED
Status: DISCONTINUED | OUTPATIENT
Start: 2019-09-10 | End: 2019-09-10 | Stop reason: HOSPADM

## 2019-09-10 RX ORDER — HYDROMORPHONE HYDROCHLORIDE 1 MG/ML
0.4 INJECTION, SOLUTION INTRAMUSCULAR; INTRAVENOUS; SUBCUTANEOUS EVERY 2 HOUR PRN
Status: DISCONTINUED | OUTPATIENT
Start: 2019-09-10 | End: 2019-09-17

## 2019-09-10 RX ORDER — SODIUM CHLORIDE 9 MG/ML
INJECTION, SOLUTION INTRAVENOUS ONCE
Status: COMPLETED | OUTPATIENT
Start: 2019-09-10 | End: 2019-09-10

## 2019-09-10 RX ORDER — METOCLOPRAMIDE HYDROCHLORIDE 5 MG/ML
10 INJECTION INTRAMUSCULAR; INTRAVENOUS EVERY 8 HOURS PRN
Status: DISCONTINUED | OUTPATIENT
Start: 2019-09-10 | End: 2019-09-17

## 2019-09-10 RX ORDER — ENOXAPARIN SODIUM 100 MG/ML
0.5 INJECTION SUBCUTANEOUS DAILY
Status: DISCONTINUED | OUTPATIENT
Start: 2019-09-11 | End: 2019-09-17

## 2019-09-10 RX ORDER — ACETAMINOPHEN 325 MG/1
650 TABLET ORAL EVERY 6 HOURS PRN
Status: DISCONTINUED | OUTPATIENT
Start: 2019-09-10 | End: 2019-09-17

## 2019-09-10 RX ORDER — HYDROMORPHONE HYDROCHLORIDE 1 MG/ML
0.8 INJECTION, SOLUTION INTRAMUSCULAR; INTRAVENOUS; SUBCUTANEOUS EVERY 2 HOUR PRN
Status: DISCONTINUED | OUTPATIENT
Start: 2019-09-10 | End: 2019-09-17

## 2019-09-10 RX ORDER — ONDANSETRON 2 MG/ML
4 INJECTION INTRAMUSCULAR; INTRAVENOUS EVERY 6 HOURS PRN
Status: DISCONTINUED | OUTPATIENT
Start: 2019-09-10 | End: 2019-09-17

## 2019-09-10 RX ORDER — HYDROCODONE BITARTRATE AND ACETAMINOPHEN 10; 325 MG/1; MG/1
1 TABLET ORAL EVERY 4 HOURS PRN
Status: DISCONTINUED | OUTPATIENT
Start: 2019-09-10 | End: 2019-09-17

## 2019-09-10 RX ORDER — DOCUSATE SODIUM 100 MG/1
100 CAPSULE, LIQUID FILLED ORAL 2 TIMES DAILY
Status: DISCONTINUED | OUTPATIENT
Start: 2019-09-10 | End: 2019-09-17

## 2019-09-10 RX ORDER — HEPARIN SODIUM 5000 [USP'U]/ML
5000 INJECTION, SOLUTION INTRAVENOUS; SUBCUTANEOUS EVERY 8 HOURS SCHEDULED
Status: DISCONTINUED | OUTPATIENT
Start: 2019-09-10 | End: 2019-09-10

## 2019-09-10 RX ORDER — HYDROMORPHONE HYDROCHLORIDE 1 MG/ML
INJECTION, SOLUTION INTRAMUSCULAR; INTRAVENOUS; SUBCUTANEOUS
Status: COMPLETED
Start: 2019-09-10 | End: 2019-09-10
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
## 2019-09-10 NOTE — ED NOTES
Call to patients daughter per pt request call to [facility] [phone]. [facility] will drive down from [facility] and will contact other family members.

## 2019-09-10 NOTE — ED NOTES
Skin breakdown noted to rgt groin area, gauze applied prior to traction application, + pedal pulse, traction at 15, pt tolerated procedure well

## 2019-09-10 NOTE — CONSULTS
[facility] Patient Status:  Inpatient    1965 MRN [id number]   [facility] 3SW-A Attending Esther Abraham MD   Hosp Day # 0 PCP Brissa Jarquin DO   CC:  right thigh pain    History of MAIN OR   • STEREOTACTIC BREAST BIOPSY     • SYNVISC INJECTION OF BILATERAL KNEE JOINT Bilateral 6/9/2016    Performed by Kevin Emery MD at [facility] MAIN OR   • TUBAL LIGATION       Family History   Problem Relation Age of Onset   • Heart Disorder Father INR 0.97   PTT 27.7     Xray pelvis and right femur:  Comminuted right femur fracture. No obvious hip or knee fracture. Impression and Plan:    Right femur fracture:    Patient’s diagnosis and treatment options were discussed.   What femoral fracture

## 2019-09-10 NOTE — PROGRESS NOTES
Dr. Daniel Jimenez notified of Serum Pregnancy result, HCG 4.0 results was indeterminate.   About to obtain urine sample for Pregnancy test but Dr. Daniel Jimenez said not need to get one after talking to the Anesthesiologist.
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
## 2019-09-10 NOTE — H&P
[facility]  History and Physical     Albaro Grief Patient Status:  Emergency    1965 MRN [id number]   Location [facility] [facility] Attending WillRaymon MD   Hosp Day # 0 PCP Shirley DO Nehemias     Chief Co ARTHROSCOPY ROTATOR CUFF REPAIR Right 6/19/2018    Performed by Maris Kasper MD at [address] [place] [address]   • STEREOTACTIC BREAST BIOPSY     • [address] INJECTION OF BILATERAL KNEE JOINT Bilateral 6/9/2016    Performed by Anabel Tidwell MD at [facility] MAIN OR   • [address] edema or cyanosis. Integument: No rashes or lesions. Psychiatric: Appropriate mood and affect but screaming in pain.        Diagnostic Data:      Labs:  Recent Labs   Lab 09/10/19  1115   WBC 15.1*   HGB 13.4   MCV 88.2   .0   INR 0.97       Recen

## 2019-09-10 NOTE — ANESTHESIA PREPROCEDURE EVALUATION
PRE-OP EVALUATION    Patient Name: Gretel Jimenez    Pre-op Diagnosis: Femur fracture, right ([address] Rolin Pain    Procedure(s):  RIGHT FEMUR RODDING    Surgeon(s) and Role:     Dawood Loomis MD - Primary    Pre-op vitals reviewed.   Temp: 97.9 °F (36 Neuro/Psych                 (+) neuromuscular disease             PCOS      Past Surgical History:   Procedure Laterality Date   • KNEE ARTHROSCOPY Right 2/23/2018    Performed by Gwen Soliman MD at [facility] MAIN OR   • KNEE JOINT INJECTIONS UNDER FLU Lab Results   Component Value Date     (H) 09/10/2019    K 4.5 09/10/2019     (H) 09/10/2019    CO2 19.0 (L) 09/10/2019    BUN 14 09/10/2019    CREATSERUM 0.58 09/10/2019    GLU 93 09/10/2019    CA 6.7 (L) 09/10/2019     Lab Results   Haroon Shield
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
## 2019-09-11 ENCOUNTER — APPOINTMENT (OUTPATIENT)
Dept: GENERAL RADIOLOGY | Facility: HOSPITAL | Age: 54
DRG: 481 | End: 2019-09-11
Attending: ORTHOPAEDIC SURGERY
Payer: COMMERCIAL

## 2019-09-11 ENCOUNTER — APPOINTMENT (OUTPATIENT)
Dept: CT IMAGING | Facility: HOSPITAL | Age: 54
DRG: 481 | End: 2019-09-11
Attending: INTERNAL MEDICINE
Payer: COMMERCIAL

## 2019-09-11 LAB
ANION GAP SERPL CALC-SCNC: 7 MMOL/L (ref 0–18)
BASOPHILS # BLD AUTO: 0.01 X10(3) UL (ref 0–0.2)
BASOPHILS # BLD AUTO: 0.01 X10(3) UL (ref 0–0.2)
BASOPHILS NFR BLD AUTO: 0.1 %
BASOPHILS NFR BLD AUTO: 0.1 %
BUN BLD-MCNC: 24 MG/DL (ref 7–18)
BUN/CREAT SERPL: 22.4 (ref 10–20)
CALCIUM BLD-MCNC: 8.1 MG/DL (ref 8.5–10.1)
CHLORIDE SERPL-SCNC: 107 MMOL/L (ref 98–112)
CO2 SERPL-SCNC: 23 MMOL/L (ref 21–32)
CREAT BLD-MCNC: 1.07 MG/DL (ref 0.55–1.02)
DEPRECATED RDW RBC AUTO: 46.5 FL (ref 35.1–46.3)
DEPRECATED RDW RBC AUTO: 46.5 FL (ref 35.1–46.3)
DEPRECATED RDW RBC AUTO: 47.1 FL (ref 35.1–46.3)
EOSINOPHIL # BLD AUTO: 0 X10(3) UL (ref 0–0.7)
EOSINOPHIL # BLD AUTO: 0 X10(3) UL (ref 0–0.7)
EOSINOPHIL NFR BLD AUTO: 0 %
EOSINOPHIL NFR BLD AUTO: 0 %
ERYTHROCYTE [DISTWIDTH] IN BLOOD BY AUTOMATED COUNT: 14 % (ref 11–15)
ERYTHROCYTE [DISTWIDTH] IN BLOOD BY AUTOMATED COUNT: 14 % (ref 11–15)
ERYTHROCYTE [DISTWIDTH] IN BLOOD BY AUTOMATED COUNT: 14.1 % (ref 11–15)
GLUCOSE BLD-MCNC: 150 MG/DL (ref 70–99)
HCT VFR BLD AUTO: 29 % (ref 35–48)
HCT VFR BLD AUTO: 29 % (ref 35–48)
HCT VFR BLD AUTO: 31.3 % (ref 35–48)
HGB BLD-MCNC: 9.2 G/DL (ref 12–16)
HGB BLD-MCNC: 9.2 G/DL (ref 12–16)
HGB BLD-MCNC: 9.9 G/DL (ref 12–16)
IMM GRANULOCYTES # BLD AUTO: 0.04 X10(3) UL (ref 0–1)
IMM GRANULOCYTES # BLD AUTO: 0.05 X10(3) UL (ref 0–1)
IMM GRANULOCYTES NFR BLD: 0.3 %
IMM GRANULOCYTES NFR BLD: 0.3 %
LYMPHOCYTES # BLD AUTO: 1.61 X10(3) UL (ref 1–4)
LYMPHOCYTES # BLD AUTO: 1.85 X10(3) UL (ref 1–4)
LYMPHOCYTES NFR BLD AUTO: 13.5 %
LYMPHOCYTES NFR BLD AUTO: 9.5 %
MCH RBC QN AUTO: 28.6 PG (ref 26–34)
MCH RBC QN AUTO: 28.6 PG (ref 26–34)
MCH RBC QN AUTO: 28.9 PG (ref 26–34)
MCHC RBC AUTO-ENTMCNC: 31.6 G/DL (ref 31–37)
MCHC RBC AUTO-ENTMCNC: 31.7 G/DL (ref 31–37)
MCHC RBC AUTO-ENTMCNC: 31.7 G/DL (ref 31–37)
MCV RBC AUTO: 90.1 FL (ref 80–100)
MCV RBC AUTO: 90.1 FL (ref 80–100)
MCV RBC AUTO: 91.3 FL (ref 80–100)
MONOCYTES # BLD AUTO: 1.94 X10(3) UL (ref 0.1–1)
MONOCYTES # BLD AUTO: 2.22 X10(3) UL (ref 0.1–1)
MONOCYTES NFR BLD AUTO: 13.1 %
MONOCYTES NFR BLD AUTO: 14.2 %
NEUTROPHILS # BLD AUTO: 13 X10 (3) UL (ref 1.5–7.7)
NEUTROPHILS # BLD AUTO: 13 X10(3) UL (ref 1.5–7.7)
NEUTROPHILS # BLD AUTO: 9.85 X10 (3) UL (ref 1.5–7.7)
NEUTROPHILS # BLD AUTO: 9.85 X10(3) UL (ref 1.5–7.7)
NEUTROPHILS NFR BLD AUTO: 71.9 %
NEUTROPHILS NFR BLD AUTO: 77 %
OSMOLALITY SERPL CALC.SUM OF ELEC: 291 MOSM/KG (ref 275–295)
PLATELET # BLD AUTO: 310 10(3)UL (ref 150–450)
PLATELET # BLD AUTO: 314 10(3)UL (ref 150–450)
PLATELET # BLD AUTO: 314 10(3)UL (ref 150–450)
POTASSIUM SERPL-SCNC: 5.2 MMOL/L (ref 3.5–5.1)
RBC # BLD AUTO: 3.22 X10(6)UL (ref 3.8–5.3)
RBC # BLD AUTO: 3.22 X10(6)UL (ref 3.8–5.3)
RBC # BLD AUTO: 3.43 X10(6)UL (ref 3.8–5.3)
SODIUM SERPL-SCNC: 137 MMOL/L (ref 136–145)
WBC # BLD AUTO: 13.7 X10(3) UL (ref 4–11)
WBC # BLD AUTO: 13.7 X10(3) UL (ref 4–11)
WBC # BLD AUTO: 16.9 X10(3) UL (ref 4–11)

## 2019-09-11 PROCEDURE — 73030 X-RAY EXAM OF SHOULDER: CPT | Performed by: ORTHOPAEDIC SURGERY

## 2019-09-11 PROCEDURE — 70450 CT HEAD/BRAIN W/O DYE: CPT | Performed by: INTERNAL MEDICINE

## 2019-09-11 PROCEDURE — 90792 PSYCH DIAG EVAL W/MED SRVCS: CPT | Performed by: OTHER

## 2019-09-11 PROCEDURE — 99232 SBSQ HOSP IP/OBS MODERATE 35: CPT | Performed by: INTERNAL MEDICINE

## 2019-09-11 RX ORDER — LORAZEPAM 2 MG/ML
2 INJECTION INTRAMUSCULAR EVERY 4 HOURS PRN
Status: DISCONTINUED | OUTPATIENT
Start: 2019-09-11 | End: 2019-09-12

## 2019-09-11 RX ORDER — HYDROMORPHONE HYDROCHLORIDE 1 MG/ML
0.4 INJECTION, SOLUTION INTRAMUSCULAR; INTRAVENOUS; SUBCUTANEOUS EVERY 2 HOUR PRN
Status: DISCONTINUED | OUTPATIENT
Start: 2019-09-11 | End: 2019-09-11

## 2019-09-11 RX ORDER — LORAZEPAM 2 MG/ML
1-2 INJECTION INTRAMUSCULAR EVERY 4 HOURS PRN
Status: DISCONTINUED | OUTPATIENT
Start: 2019-09-11 | End: 2019-09-11 | Stop reason: SDUPTHER

## 2019-09-11 RX ORDER — LORAZEPAM 2 MG/ML
1 INJECTION INTRAMUSCULAR EVERY 4 HOURS PRN
Status: DISCONTINUED | OUTPATIENT
Start: 2019-09-11 | End: 2019-09-12
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
## 2019-09-11 NOTE — PLAN OF CARE
Received patient from PACU at 2218. Patient drowsy but easily arousable. Right hip with aquacel x2. Ice applied. On O2 2L nc satting 96-99%. /76  resp 16. Family at bedside. Plan of care reviewed. Safety precautions maintained.  Call light withi

## 2019-09-11 NOTE — PLAN OF CARE
Patient with Lora Chroman in place. Unable to void. Bladder scan done and shows 900mls.  Patient straight cath for   1000ml

## 2019-09-11 NOTE — ANESTHESIA POSTPROCEDURE EVALUATION
[address] [place] [address] Patient Status:  Inpatient   Age/Gender 48year old female MRN [id number]   Location [facility] Attending Cyntha Leventhal, MD   Hosp Day # 0 PCP Sowmya Mondragon DO       Anesthesia Post-

## 2019-09-11 NOTE — PROGRESS NOTES
Orthopedic surgery progress note    Olga Sabrina Patient Status:  Inpatient    1965 MRN [id number]   [facility] 3SW-A Attending Azucena Juárez MD   [address] Day # 1 PCP Junaid Warren DO       Subjective: Has right thigh pain.
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
## 2019-09-11 NOTE — CONSULTS
[facility]:  [facility]    1965 MRN [id number]   [facility] 3SW-A Attending Julio Jackson MD   [facility] Day # 1 PCP Brit Pang DO     Patient Identification  Sienna Manjarrez is a 5 breast disease    • Fibromyalgia    • Obesity, unspecified    • PCOS (polycystic ovarian syndrome)    • Visual impairment     glasses       Past Surgical History:   Procedure Laterality Date   • FEMUR IM NAIL Right 9/10/2019    Performed by Gracy Vargas MD mg Intravenous Once   0.9% NaCl infusion  Intravenous Continuous   acetaminophen (TYLENOL) tab 650 mg 650 mg Oral Q6H PRN   HYDROmorphone HCl (DILAUDID) 1 MG/ML injection 0.2 mg 0.2 mg Intravenous Q2H PRN   Or      HYDROmorphone HCl (DILAUDID) 1 MG/ML inje BUN 24 09/11/2019     09/11/2019    K 5.2 09/11/2019     09/11/2019    CO2 23.0 09/11/2019     09/11/2019    CA 8.1 09/11/2019       Review of Systems:  ROS as listed in HPI   Other 10 point review of systems within normal limits.     OBJ Finger to nose and heel to shin testing is normal bilaterally. Psychiatric: Awake, alert and oriented x 3. Medical Group.
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
## 2019-09-11 NOTE — PROGRESS NOTES
[facility]  Progress Note    Alise Patiño Patient Status:  Inpatient    1965 MRN [id number]   [facility] 3SW-A Attending Valerie Rico MD   Hosp Day # 1 PCP Yadi Aggarwal DO     Subjective:    Patient c/o right leg/h intervertebral disc     Bilateral lumbar radiculopathy     Primary osteoarthritis of right knee     AA/RCR RIGHT SHOULDER  Global 09/17/2018     right knee scope sx  global exp 5/24/18     Globus sensation     Gastroesophageal reflux disease with esophagit
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
## 2019-09-11 NOTE — PLAN OF CARE
Dr. Borrego Setting at bedside at approx 0900 this AM- discussed elevated HR, tele in place, per tele tech rhythm is sinus tach with occasional PVC's 's- 120's at rest with reported 140's during [address] [place].   Discussed upcoming PT session, orders to give 1mg ativ

## 2019-09-11 NOTE — PLAN OF CARE
Patients heart rate 108-140. Dr Sangeetha Ren notified. Orders received. Patient taking norco for pain. States pain 7/10. Patient anxious at times. Family at bedside.
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
## 2019-09-11 NOTE — CM/SW NOTE
Met with pt to discuss DC planning. Pt in the process of leaving the floor for testing. Pt gave permission for SW to speak with her family. Spoke with pt's dtr Jack Horta ([phone]), pt's mother and pt's sister at bedside.   Pt lives alone and is n Home 1   Patient lives with Alone   Patient Status Prior to Admission   Independent with ADLs and Mobility Yes   Discharge Needs   Anticipated D/C needs Acute rehab

## 2019-09-11 NOTE — PROGRESS NOTES
[facility] Progress Note:  Anticoagulation Weight Dose Adjustment for enoxaparin (LOVENOX)    Sienna Ruiz is a 48year old female who has been prescribed enoxaparin (LOVENOX) for VTE prophylaxis.       Estimated Creatinine Clearance: 80.6 mL/min

## 2019-09-11 NOTE — CONSULTS
[facility]  Report of Psychiatric Consultation    Hema Grajeda Patient Status:  Inpatient    1965 MRN [id number]   [facility] 3SW-A Attending Diego Daniels MD   [facility] Day [facility] 1 PCP Kwan Echavarria DO     Date of Admission and alternated between hyperventilating and holding her breath per report from RN. She appeared to be in a state of panic and shock. She was given 2mg IV Ativan which calmed her down. I came back to see her this evening.  She just got another 2mg IV Ati NAIL Right 9/10/2019    Performed by Ekaterina Patricia MD at [facility] MAIN OR   • KNEE ARTHROSCOPY Right 2/23/2018    Performed by Ammon Nicole MD at [address] Bilateral 4/14/2016    Performed by Walt Sewell (ATIVAN) injection 2 mg, 2 mg, Intravenous, Q4H PRN  •  HYDROmorphone HCl (DILAUDID) 1 MG/ML injection 1 mg, 1 mg, Intravenous, Q30 Min PRN  •  0.9% NaCl infusion, , Intravenous, Continuous  •  acetaminophen (TYLENOL) tab 650 mg, 650 mg, Oral, Q6H PRN  • 09/11/2019    HGB 9.2 09/11/2019    HGB 9.2 09/11/2019    HCT 29.0 09/11/2019    HCT 29.0 09/11/2019    .0 09/11/2019    .0 09/11/2019    CREATSERUM 1.07 09/11/2019    BUN 24 09/11/2019     09/11/2019    K 5.2 09/11/2019     09/11

## 2019-09-12 LAB
DEPRECATED RDW RBC AUTO: 44.5 FL (ref 35.1–46.3)
ERYTHROCYTE [DISTWIDTH] IN BLOOD BY AUTOMATED COUNT: 13.7 % (ref 11–15)
HCT VFR BLD AUTO: 20.5 % (ref 35–48)
HGB BLD-MCNC: 6.5 G/DL (ref 12–16)
HGB BLD-MCNC: 7.5 G/DL (ref 12–16)
MCH RBC QN AUTO: 28.3 PG (ref 26–34)
MCHC RBC AUTO-ENTMCNC: 31.7 G/DL (ref 31–37)
MCV RBC AUTO: 89.1 FL (ref 80–100)
PLATELET # BLD AUTO: 206 10(3)UL (ref 150–450)
RBC # BLD AUTO: 2.3 X10(6)UL (ref 3.8–5.3)
WBC # BLD AUTO: 14 X10(3) UL (ref 4–11)

## 2019-09-12 PROCEDURE — 99232 SBSQ HOSP IP/OBS MODERATE 35: CPT | Performed by: OTHER

## 2019-09-12 PROCEDURE — 30233N1 TRANSFUSION OF NONAUTOLOGOUS RED BLOOD CELLS INTO PERIPHERAL VEIN, PERCUTANEOUS APPROACH: ICD-10-PCS | Performed by: INTERNAL MEDICINE

## 2019-09-12 PROCEDURE — 99232 SBSQ HOSP IP/OBS MODERATE 35: CPT | Performed by: INTERNAL MEDICINE

## 2019-09-12 RX ORDER — SODIUM CHLORIDE 9 MG/ML
INJECTION, SOLUTION INTRAVENOUS ONCE
Status: COMPLETED | OUTPATIENT
Start: 2019-09-12 | End: 2019-09-12

## 2019-09-12 RX ORDER — LORAZEPAM 1 MG/1
2 TABLET ORAL EVERY 6 HOURS PRN
Status: DISCONTINUED | OUTPATIENT
Start: 2019-09-12 | End: 2019-09-17

## 2019-09-12 RX ORDER — LORAZEPAM 1 MG/1
1 TABLET ORAL EVERY 6 HOURS PRN
Status: DISCONTINUED | OUTPATIENT
Start: 2019-09-12 | End: 2019-09-17

## 2019-09-12 NOTE — PHYSICAL THERAPY NOTE
Attempted to see patient for PT services this am, however patient with hemoglobin 6.5, awaiting blood transfusion.  Per RN request will hold at this time and reattempt as able following transfusion
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
## 2019-09-12 NOTE — PLAN OF CARE
Patient to side of bed with PT. Fall and DVT prevention in place. Discussed HR with Dr. Brandon Arnold, staff to contact MD if HR greater than 140. See earlier note for shift events.
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
## 2019-09-12 NOTE — PLAN OF CARE
Pt is sleepy but easy to arouse in the beginning of the shift. She informed that nurse that her \"pain is coming back on the right leg\". Aquacel dressing x 2 noted to be C/D/I.  Slight anxiety noted, encouraged deep breathing exercises as well as relaxatio

## 2019-09-12 NOTE — OCCUPATIONAL THERAPY NOTE
Attempted to see patient for OT services this am, however patient with hemoglobin 6.5, awaiting blood transfusion. Per RN request will hold at this time and reattempt as able following transfusion.

## 2019-09-12 NOTE — CM/SW NOTE
Pts daughter Craig [facility] requested to speak with CM. She states she has previously met with unit SW and concerned \"pts and family's concerns\" are not being addressed, ie; they want pt to go to rehab in the [facility], which is close to where family lives.      I ass
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
## 2019-09-12 NOTE — CM/SW NOTE
Received call from pt's dtr, Lise Chahal requesting to meet with SW later today to review HCPOA paperwork. Also discuss DC planning. Pt seen by Dr Anamaria Marin with recommendation for acute rehab.   KIM contacted pt's [employer] and was informed Bhargavmady Dalton is pr

## 2019-09-12 NOTE — CM/SW NOTE
Spoke with Ramona Jay from [facility] ([phone]) who stated referral is still being reviewed for clinical acceptance.   They have spoken with Jazmin Lauren from [facility] regarding insurance approval for their facility as they are in [facility], but not preferred provide

## 2019-09-12 NOTE — PLAN OF CARE
Patient received drowsy, she open her eyes when name called out. Right lateral thigh with Aquacel dressing x 2, dry and intact. Hbg 6.5 this morning, transfusing 1 unit of PRBC. Will recheck Hemoglobin an hour after transfusion.   Informed PT to hold the

## 2019-09-12 NOTE — PROGRESS NOTES
C/o right thigh pain. Improved from pre op level of pain. No numbness. Temp:  [98.3 °F (36.8 °C)-100.4 °F (38 °C)] 99.2 °F (37.3 °C)  Pulse:  [] 114  Resp:  [16-21] 18  BP: ()/(48-74) 109/55    No distress. Right thigh dressing intact.   Ashish Perez

## 2019-09-12 NOTE — PROGRESS NOTES
Patient cooperative with care today. Appeared to be anxious this afternoon, unable to have comfortable position in bed. Patient hyperventilating and complaint of having some chest discomfort. Lorazepam IV given.    Patient repositioned and made comfortab

## 2019-09-12 NOTE — PROGRESS NOTES
Dr. Jose Silva was paged re critical hgb level. - see orders    [phone]: 2 consecutive reading of low grade temp - Dr. Jose Silva was paged, awaiting call back    [phone]:  As per Dr. Jose Silva its okay to start BT with low grade temp

## 2019-09-13 ENCOUNTER — APPOINTMENT (OUTPATIENT)
Dept: GENERAL RADIOLOGY | Facility: HOSPITAL | Age: 54
DRG: 481 | End: 2019-09-13
Attending: INTERNAL MEDICINE
Payer: COMMERCIAL

## 2019-09-13 LAB
BILIRUB UR QL STRIP.AUTO: NEGATIVE
BLOOD TYPE BARCODE: 5100
CLARITY UR REFRACT.AUTO: CLEAR
DEPRECATED RDW RBC AUTO: 42.7 FL (ref 35.1–46.3)
ERYTHROCYTE [DISTWIDTH] IN BLOOD BY AUTOMATED COUNT: 13.3 % (ref 11–15)
GLUCOSE BLD-MCNC: 108 MG/DL (ref 70–99)
GLUCOSE UR STRIP.AUTO-MCNC: NEGATIVE MG/DL
HCT VFR BLD AUTO: 19.7 % (ref 35–48)
HGB BLD-MCNC: 6.8 G/DL (ref 12–16)
HGB BLD-MCNC: 8 G/DL (ref 12–16)
KETONES UR STRIP.AUTO-MCNC: NEGATIVE MG/DL
LEUKOCYTE ESTERASE UR QL STRIP.AUTO: NEGATIVE
MCH RBC QN AUTO: 30.5 PG (ref 26–34)
MCHC RBC AUTO-ENTMCNC: 34.5 G/DL (ref 31–37)
MCV RBC AUTO: 88.3 FL (ref 80–100)
NITRITE UR QL STRIP.AUTO: NEGATIVE
PH UR STRIP.AUTO: 7 [PH] (ref 4.5–8)
PLATELET # BLD AUTO: 192 10(3)UL (ref 150–450)
PROT UR STRIP.AUTO-MCNC: NEGATIVE MG/DL
RBC # BLD AUTO: 2.23 X10(6)UL (ref 3.8–5.3)
RBC UR QL AUTO: NEGATIVE
SP GR UR STRIP.AUTO: 1.01 (ref 1–1.03)
UROBILINOGEN UR STRIP.AUTO-MCNC: <2 MG/DL
WBC # BLD AUTO: 13.1 X10(3) UL (ref 4–11)

## 2019-09-13 PROCEDURE — 99232 SBSQ HOSP IP/OBS MODERATE 35: CPT | Performed by: INTERNAL MEDICINE

## 2019-09-13 PROCEDURE — 71045 X-RAY EXAM CHEST 1 VIEW: CPT | Performed by: INTERNAL MEDICINE

## 2019-09-13 PROCEDURE — 99232 SBSQ HOSP IP/OBS MODERATE 35: CPT | Performed by: OTHER

## 2019-09-13 PROCEDURE — 99291 CRITICAL CARE FIRST HOUR: CPT | Performed by: HOSPITALIST

## 2019-09-13 RX ORDER — PROPRANOLOL HYDROCHLORIDE 20 MG/1
20 TABLET ORAL 3 TIMES DAILY
Status: DISCONTINUED | OUTPATIENT
Start: 2019-09-13 | End: 2019-09-17

## 2019-09-13 RX ORDER — LORAZEPAM 2 MG/ML
1 INJECTION INTRAMUSCULAR ONCE
Status: COMPLETED | OUTPATIENT
Start: 2019-09-13 | End: 2019-09-13

## 2019-09-13 RX ORDER — SODIUM CHLORIDE 9 MG/ML
INJECTION, SOLUTION INTRAVENOUS ONCE
Status: COMPLETED | OUTPATIENT
Start: 2019-09-13 | End: 2019-09-13

## 2019-09-13 RX ORDER — HYDROCODONE BITARTRATE AND ACETAMINOPHEN 10; 325 MG/1; MG/1
1 TABLET ORAL EVERY 6 HOURS PRN
Qty: 50 TABLET | Refills: 0 | Status: SHIPPED | OUTPATIENT
Start: 2019-09-13 | End: 2020-03-16

## 2019-09-13 RX ORDER — ESCITALOPRAM OXALATE 10 MG/1
10 TABLET ORAL DAILY
Status: DISCONTINUED | OUTPATIENT
Start: 2019-09-14 | End: 2019-09-17

## 2019-09-13 RX ORDER — LORAZEPAM 2 MG/ML
1 INJECTION INTRAMUSCULAR EVERY 6 HOURS PRN
Status: DISCONTINUED | OUTPATIENT
Start: 2019-09-13 | End: 2019-09-17

## 2019-09-13 RX ORDER — LORAZEPAM 2 MG/ML
2 INJECTION INTRAMUSCULAR EVERY 6 HOURS PRN
Status: DISCONTINUED | OUTPATIENT
Start: 2019-09-13 | End: 2019-09-17

## 2019-09-13 NOTE — CM/SW NOTE
Spoke with Tan Guthrie from [facility] ([phone]) regarding referral for acute rehab.   She would like to speak with pt/dtr regarding acute rehab program to confirm pt is willing to participate in acute therapy services at [facility] before clinically accepting patient for
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
## 2019-09-13 NOTE — PLAN OF CARE
PT. C/O OF SEVERE POSTOP PAIN  MODERATELY MANAGED WITH PRN [place] 10 AND FREQUENT REPOSITIONING. PRN  ORAL ATIVAN GIVEN FOR ANXIETY. LOW GRADE TEMP IMPROVED WITH COUGHING AND DEEP BREATHING EXERCISES.  MAINTAINED. SURGICAL DRESSING CDI.  SIMMONS PATENT WITH G

## 2019-09-13 NOTE — PROGRESS NOTES
SPOKE TO DR. ERNST REGARDING LOW GRADE TEMPS AND CRITICAL HGB 6.8. ORDER GIVEN TO DO BLOOD CULTURES,SEND URINE FOR URINALYSIS WITH REFEX C/S, PORTABLE CXR AND BLOOD TRANSFUSION. PT. AND PT.S DTR. UPDATED ON PLAN OF CARE.

## 2019-09-13 NOTE — PROGRESS NOTES
ELIAN RN UPDATED ON NEED FOR BLOOD TRANSFUSION AND TO HOLD LOVENOX UNTIL AFTER TRANSFUSION PER DR. ERNST.
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
## 2019-09-13 NOTE — PLAN OF CARE
This morning during change of shift report, pt c/o pain and stating that she wanted to get up. [address] [facility] pt one [place] 10 at 0758. I was called into room by tech shortly afterward because pt was unresponsive.  Vital sign stable, JOE, Rapid response
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
## 2019-09-13 NOTE — PROGRESS NOTES
Patient does not really verbalize any issues with me. Daughter speaks for her. No major complaints as far as I can tell. No numbness or tingling. No other major pain.     Temp:  [98.3 °F (36.8 °C)-100.3 °F (37.9 °C)] 99.9 °F (37.7 °C)  Pulse:  [103-110]
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
## 2019-09-14 LAB — BLOOD TYPE BARCODE: 5100

## 2019-09-14 PROCEDURE — 99232 SBSQ HOSP IP/OBS MODERATE 35: CPT | Performed by: INTERNAL MEDICINE

## 2019-09-14 RX ORDER — CALCIUM CARBONATE 200(500)MG
500 TABLET,CHEWABLE ORAL
Status: DISCONTINUED | OUTPATIENT
Start: 2019-09-14 | End: 2019-09-17
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
## 2019-09-14 NOTE — PLAN OF CARE
POD 4 Pt aaox4, very drowsy, will drift off mid-conversation, VSS no issues overnight, IV SL, Aquacel dressings x4 CDI, Pt remains TTWB, Cabrera in place draining clear yellow urine. PO pain medication, plan is Acute rehab on D/C.  Pt doing well, will continu
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
## 2019-09-15 PROCEDURE — 99232 SBSQ HOSP IP/OBS MODERATE 35: CPT | Performed by: INTERNAL MEDICINE

## 2019-09-15 RX ORDER — ENOXAPARIN SODIUM 100 MG/ML
1 INJECTION SUBCUTANEOUS DAILY
Qty: 14 SYRINGE | Refills: 0 | Status: SHIPPED | OUTPATIENT
Start: 2019-09-15 | End: 2019-10-16
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
## 2019-09-15 NOTE — PLAN OF CARE
Pt A&Ox4. On ra poc & scds in place. Pt needing 2 l o2 via nc when sleeping. Pain controlled on pain meds. Vss. Tolerating diet, bm this afternoon.  Voiding in bedpan, jennifer cifuentes this am, ok to dc per im md. Pt up with mod-max assist walker & gait belt, tot
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
## 2019-09-16 PROCEDURE — 99232 SBSQ HOSP IP/OBS MODERATE 35: CPT | Performed by: OTHER

## 2019-09-16 PROCEDURE — 99232 SBSQ HOSP IP/OBS MODERATE 35: CPT | Performed by: INTERNAL MEDICINE

## 2019-09-16 RX ORDER — LORAZEPAM 2 MG/1
TABLET ORAL
Qty: 30 TABLET | Refills: 0 | Status: SHIPPED | OUTPATIENT
Start: 2019-09-16 | End: 2019-10-16 | Stop reason: DRUGHIGH

## 2019-09-16 RX ORDER — ESCITALOPRAM OXALATE 10 MG/1
10 TABLET ORAL DAILY
Qty: 30 TABLET | Refills: 0 | Status: SHIPPED | OUTPATIENT
Start: 2019-09-17 | End: 2019-11-13 | Stop reason: DRUGHIGH

## 2019-09-16 RX ORDER — PROPRANOLOL HYDROCHLORIDE 20 MG/1
20 TABLET ORAL 3 TIMES DAILY
Qty: 90 TABLET | Refills: 0 | Status: SHIPPED | OUTPATIENT
Start: 2019-09-16 | End: 2019-11-13
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
## 2019-09-16 NOTE — CM/SW NOTE
Met with pt and pt's dtr, Nilton Staley at bedside to discuss anticipated plan for [facility] acute, possible today. Pt's family would like to be present at DC and would like to be informed of DC time.     Spoke with Zain Gunn from [facility] who confirmed they have received verba
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
## 2019-09-16 NOTE — PLAN OF CARE
POD 6 Pt is AAOX4, drowsy, drifts off mid-conversation, VSS, IV SL, Pt spent night in bedside chair / recliner, Aquacel dressings x3 with scant old drainage remain DI, remaining Aquacel dressings D/T be D/C'd on 9/17.  Pt remains TTWB to Rt leg, Voiding nevaeh
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
## 2019-09-16 NOTE — CM/SW NOTE
Spoke with Amrik Griggs from [facility] ([phone]) who confirmed pt has been clinically accepted for admission. They will submit for insurance auth today. She spoke with pt's family on Friday and plans to meet with pt today.     Spoke with Mitra from [facility] [phone]
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
## 2019-09-17 VITALS
TEMPERATURE: 99 F | OXYGEN SATURATION: 92 % | HEART RATE: 77 BPM | WEIGHT: 225 LBS | BODY MASS INDEX: 44.17 KG/M2 | HEIGHT: 60 IN | SYSTOLIC BLOOD PRESSURE: 99 MMHG | RESPIRATION RATE: 19 BRPM | DIASTOLIC BLOOD PRESSURE: 58 MMHG

## 2019-09-17 LAB
CREAT BLD-MCNC: 0.6 MG/DL (ref 0.55–1.02)
PLATELET # BLD AUTO: 420 10(3)UL (ref 150–450)

## 2019-09-17 PROCEDURE — 99239 HOSP IP/OBS DSCHRG MGMT >30: CPT | Performed by: INTERNAL MEDICINE

## 2019-09-17 PROCEDURE — 99232 SBSQ HOSP IP/OBS MODERATE 35: CPT | Performed by: OTHER
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
## 2019-09-17 NOTE — PLAN OF CARE
Assumed care of pt at 0715am  PT A/O x 4. VSS. Calm and tearful at times. Rt leg surgical dressing in place, C/D/I. Rt calf soft, no tender. Pt performing ankle pumps. Pt encouraged to use I.S.  PRN [place] given x 1, pt states relief of rt leg pain.   UT injury  Description  INTERVENTIONS:  - Assess pt frequently for physical needs  - Identify cognitive and physical deficits and behaviors that affect risk of falls.   - [place] fall precautions as indicated by assessment.  - Educate pt/family on patient sa
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
## 2019-09-17 NOTE — PROGRESS NOTES
Report called to [facility] receiving RN Cristian Loomis.  All questions answered, verbalized understanding

## 2019-09-17 NOTE — CM/SW NOTE
Spoke with Juliann at [facility]. Sent her updated MAR per request and she did review and they will accept pt today. She is fine with 2pm transport time. Per Lindwood Boxer pt will go to room on 20th floor and room will be assigned when she arrives.    RN informed to

## 2019-09-17 NOTE — CM/SW NOTE
09/17/19 1100   Discharge disposition   Expected discharge disposition Short Term H   Name of Facillity/Home Care/Hospice ACF Other   Additional Home Care/Hospice Provider   Aidan Sheldon ability lab)   Discharge transportation [employer]
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
## 2019-09-17 NOTE — PROGRESS NOTES
NURSING DISCHARGE NOTE    Discharge orders and instructions given to Ambulance transporter. All questions answered, verbalized understanding. Report called prior to receiving RN Estrellita at [facility] at [phone].   All questions answere

## 2019-09-17 NOTE — PLAN OF CARE
Takes Norco for pain with adequate relief. Was up in the chair most of the evening. Needs mod-max assist on transfers. Calm and cooperative, daughter and friends at bedside. Dsg to rt leg intact, calf soft. Encouraged use of IS and ankle pump exercises.  Pl
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
## 2019-09-17 NOTE — DISCHARGE SUMMARY
[facility] HOSPITALIST  DISCHARGE SUMMARY     Sigrid Hinojosa Patient Status:  Inpatient    1965 MRN [id number]   [facility] 3SW-A Attending Esther Abraham MD   [facility] Day # 7 PCP Brissa Jarquin DO     Date of Admission: 9/10/2019 asSherrel Ok      Take 1 tablet (10 mg total) by mouth daily. Quantity:  30 tablet  Refills:  0     HYDROcodone-acetaminophen  MG Tabs  Commonly known as:  NORCO      Take 1 tablet by mouth every 6 (six) hours as needed.    Quantity:  50 tablet  Ref

## 2019-10-07 ENCOUNTER — MED REC SCAN ONLY (OUTPATIENT)
Dept: FAMILY MEDICINE CLINIC | Facility: CLINIC | Age: 54
End: 2019-10-07

## 2019-10-10 ENCOUNTER — TELEPHONE (OUTPATIENT)
Dept: FAMILY MEDICINE CLINIC | Facility: CLINIC | Age: 54
End: 2019-10-10

## 2019-10-10 DIAGNOSIS — S72.301A CLOSED FRACTURE OF SHAFT OF RIGHT FEMUR, UNSPECIFIED FRACTURE MORPHOLOGY, INITIAL ENCOUNTER (HCC): Primary | ICD-10-CM

## 2019-10-10 NOTE — TELEPHONE ENCOUNTER
BRAD FROM [facility] CALLED AND ADV NEEDS A REFERRAL FOR   WHEELCHAIR AND ACCESSORIES.     [address] PROVIDED:  [address]  [address] [facility] ([address] [facility])    [address] [facility]    WBOAU:[phone] X 77 [facility]

## 2019-10-16 ENCOUNTER — TELEPHONE (OUTPATIENT)
Dept: FAMILY MEDICINE CLINIC | Facility: CLINIC | Age: 54
End: 2019-10-16

## 2019-10-16 ENCOUNTER — OFFICE VISIT (OUTPATIENT)
Dept: FAMILY MEDICINE CLINIC | Facility: CLINIC | Age: 54
End: 2019-10-16

## 2019-10-16 VITALS
HEART RATE: 64 BPM | RESPIRATION RATE: 16 BRPM | DIASTOLIC BLOOD PRESSURE: 62 MMHG | SYSTOLIC BLOOD PRESSURE: 100 MMHG | TEMPERATURE: 98 F

## 2019-10-16 DIAGNOSIS — M54.16 BILATERAL LUMBAR RADICULOPATHY: ICD-10-CM

## 2019-10-16 DIAGNOSIS — S72.301A CLOSED FRACTURE OF SHAFT OF RIGHT FEMUR, UNSPECIFIED FRACTURE MORPHOLOGY, INITIAL ENCOUNTER (HCC): Primary | ICD-10-CM

## 2019-10-16 DIAGNOSIS — F43.0 ACUTE STRESS DISORDER: ICD-10-CM
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
## 2019-10-16 PROCEDURE — 99214 OFFICE O/P EST MOD 30 MIN: CPT | Performed by: FAMILY MEDICINE

## 2019-10-16 RX ORDER — DOCUSATE SODIUM 100 MG/1
100 CAPSULE, LIQUID FILLED ORAL 2 TIMES DAILY
COMMUNITY
End: 2020-09-28

## 2019-10-16 RX ORDER — ERGOCALCIFEROL 1.25 MG/1
50000 CAPSULE ORAL WEEKLY
Qty: 12 CAPSULE | Refills: 1 | Status: SHIPPED | OUTPATIENT
Start: 2019-10-16 | End: 2019-12-11

## 2019-10-16 RX ORDER — LORAZEPAM 1 MG/1
1 TABLET ORAL EVERY 6 HOURS PRN
Qty: 30 TABLET | Refills: 0 | Status: SHIPPED | OUTPATIENT
Start: 2019-10-16 | End: 2019-11-13

## 2019-10-16 RX ORDER — TRAZODONE HYDROCHLORIDE 50 MG/1
25 TABLET ORAL NIGHTLY PRN
Qty: 30 TABLET | Refills: 1 | Status: SHIPPED | OUTPATIENT
Start: 2019-10-16 | End: 2019-10-30

## 2019-10-16 RX ORDER — LORAZEPAM 1 MG/1
1 TABLET ORAL EVERY 6 HOURS PRN
Refills: 0 | COMMUNITY
Start: 2019-10-04 | End: 2019-10-16
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
## 2019-10-16 NOTE — TELEPHONE ENCOUNTER
Sally Valdes From [facility] is calling to talk to nurse about [address] for pt.    Please return call to [phone]

## 2019-10-16 NOTE — TELEPHONE ENCOUNTER
Mayra from [facility] called and advised that she has been unsuccessful in findiing pt HH. She states she has exhausted the list and that no one is wiling to accept pt d/t where the pt is located.     Leela Dominguez also advised that it is her jjob to follow up on th

## 2019-10-17 ENCOUNTER — TELEPHONE (OUTPATIENT)
Dept: FAMILY MEDICINE CLINIC | Facility: CLINIC | Age: 54
End: 2019-10-17

## 2019-10-17 NOTE — TELEPHONE ENCOUNTER
Please see Referral #[id number]    Pt has been d/c from [facility] since 10/8/19 with out [facility]. Pt has IHP Plan and is living in [place] with her niece while she is recovering. Because of her location no one in [facility] will travel to see her.      Pt has not be

## 2019-10-18 ENCOUNTER — TELEPHONE (OUTPATIENT)
Dept: FAMILY MEDICINE CLINIC | Facility: CLINIC | Age: 54
End: 2019-10-18

## 2019-10-18 NOTE — TELEPHONE ENCOUNTER
Darryl Connelly from [facility] calling back. States [facility] has been authorized to go out and see patient. Need to contact Marco Napier from [facility] at [phone] to see if anything further is needed from our office.      Spoke with Marco Napier at [facility] who sta

## 2019-10-22 ENCOUNTER — TELEPHONE (OUTPATIENT)
Dept: FAMILY MEDICINE CLINIC | Facility: CLINIC | Age: 54
End: 2019-10-22
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
## 2019-10-22 DIAGNOSIS — S72.301A CLOSED FRACTURE OF SHAFT OF RIGHT FEMUR, UNSPECIFIED FRACTURE MORPHOLOGY, INITIAL ENCOUNTER (HCC): Primary | ICD-10-CM

## 2019-10-22 NOTE — TELEPHONE ENCOUNTER
[facility] sent over fax that they need order from PCP for Wheelchair    Code provided;  Pawan Pond  [phone]    Date of service  10/22/1    NPI [id number]    Referral placed and faxed to [phone]

## 2019-10-23 ENCOUNTER — TELEPHONE (OUTPATIENT)
Dept: FAMILY MEDICINE CLINIC | Facility: CLINIC | Age: 54
End: 2019-10-23

## 2019-10-23 NOTE — TELEPHONE ENCOUNTER
Phone call to dianelys and was advised that patient picked up the prescription for lorazepam on 10 18 2019.

## 2019-10-30 ENCOUNTER — TELEPHONE (OUTPATIENT)
Dept: FAMILY MEDICINE CLINIC | Facility: CLINIC | Age: 54
End: 2019-10-30

## 2019-10-30 RX ORDER — TRAZODONE HYDROCHLORIDE 50 MG/1
50 TABLET ORAL NIGHTLY PRN
Qty: 90 TABLET | Refills: 2 | Status: SHIPPED | OUTPATIENT
Start: 2019-10-30 | End: 2019-11-13

## 2019-10-30 RX ORDER — TRAZODONE HYDROCHLORIDE 50 MG/1
25 TABLET ORAL NIGHTLY PRN
Qty: 90 TABLET | Refills: 2 | Status: SHIPPED | OUTPATIENT
Start: 2019-10-30 | End: 2019-10-30

## 2019-10-30 NOTE — TELEPHONE ENCOUNTER
LOV: 10/16/19  Last Refill: 10/16/19 #30 1 RF  Pt states she is taking 1 tab daily- note 0.5 tab    Pt requests 90 day supply

## 2019-10-30 NOTE — TELEPHONE ENCOUNTER
Pt called, medication-traZODone HCl 50 MG Oral Tab, should be for 1 tab a day. Pharmacy needs new script from us and pt would like a 90 day supply.     Please call pt at [phone]

## 2019-11-05 ENCOUNTER — TELEPHONE (OUTPATIENT)
Dept: FAMILY MEDICINE CLINIC | Facility: CLINIC | Age: 54
End: 2019-11-05

## 2019-11-05 DIAGNOSIS — S72.391G: Primary | ICD-10-CM

## 2019-11-05 NOTE — TELEPHONE ENCOUNTER
Patient would like a referral to see a new Orthopaedic doctor, Matt Agosto 11. Please call patient when referral is complete so she can call and make the appointment.  Trying to coordinate with another appointment on the same day as  Dr Pastor Dowell on t

## 2019-11-05 NOTE — TELEPHONE ENCOUNTER
Pt was advised that DS placed order and it is in chart    Future Appointments   Date Time Provider Karly Recinos   11/13/2019  1:00 PM Melody Garcia [facility] MERI Rene   11/15/2019 10:10 AM Gracy Vargas MD MMO NP PAT Ortez   11/20/2019  8:15

## 2019-11-13 ENCOUNTER — TELEPHONE (OUTPATIENT)
Dept: FAMILY MEDICINE CLINIC | Facility: CLINIC | Age: 54
End: 2019-11-13

## 2019-11-13 DIAGNOSIS — N83.201 OVARIAN CYST, RIGHT: Primary | ICD-10-CM

## 2019-11-13 NOTE — TELEPHONE ENCOUNTER
Pt states findings are clearly marked in her chart from the full body scans done on 9/10/19. Please see conclusion 9/10/19. Pt is most concerned with findings #3 and #4.     Also- pt needs to know if DS is going to continue at home PT for pt?

## 2019-11-13 NOTE — TELEPHONE ENCOUNTER
So she has an ovarian cyst, not a mass  which is something that can be followed  Up in 1-2 months to see if it changes I will put the order in and she can schedule, it at her convenice.  Also she does not have a 'HOLE\" she has a hiatal hernia which is the

## 2019-11-13 NOTE — TELEPHONE ENCOUNTER
So I looked through her chart and I did not see anywhere where they mentioned a pelvic mass, of a hole in her esophagus, if that was the case she would have likely gone to surgery for repair
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
## 2019-11-13 NOTE — TELEPHONE ENCOUNTER
Pt was advised of information below- verbalized understanding    Pt will call to schedule US- was given number to CS    Per verbal with DS yes he will continue to sign her PT orders- please have them fax something over.     Pt verbalized understanding

## 2019-11-13 NOTE — TELEPHONE ENCOUNTER
Pt called she was just here and forgot to ask Dr. Sandra Muro about the fact they found mass in pelvic area and told Pt she needed to get a pelvic ultra sound and wants to get referral for that?   Also found a whole in her esophagus and wants to know what she paris

## 2019-11-15 ENCOUNTER — HOSPITAL ENCOUNTER (OUTPATIENT)
Dept: MAMMOGRAPHY | Age: 54
Discharge: HOME | End: 2019-11-15
Attending: FAMILY MEDICINE
Payer: COMMERCIAL

## 2019-11-15 DIAGNOSIS — R92.2 DENSE BREASTS: ICD-10-CM

## 2019-11-15 DIAGNOSIS — Z12.39 BREAST CANCER SCREENING: ICD-10-CM

## 2019-11-15 DIAGNOSIS — Z00.00 ROUTINE HEALTH MAINTENANCE: ICD-10-CM

## 2019-11-15 PROCEDURE — 77067 SCR MAMMO BI INCL CAD: CPT | Performed by: FAMILY MEDICINE

## 2019-11-15 PROCEDURE — 77063 BREAST TOMOSYNTHESIS BI: CPT | Performed by: FAMILY MEDICINE

## 2019-11-22 ENCOUNTER — TELEPHONE (OUTPATIENT)
Dept: FAMILY MEDICINE CLINIC | Facility: CLINIC | Age: 54
End: 2019-11-22

## 2019-11-22 NOTE — TELEPHONE ENCOUNTER
Patient thinks Dr. Alina Bates put in an order for more home health therapy but she doesn't need it and wants it cancelled. Please call back, if any questions.

## 2019-12-11 ENCOUNTER — OFFICE VISIT (OUTPATIENT)
Dept: FAMILY MEDICINE CLINIC | Facility: CLINIC | Age: 54
End: 2019-12-11

## 2019-12-11 VITALS
RESPIRATION RATE: 18 BRPM | TEMPERATURE: 98 F | SYSTOLIC BLOOD PRESSURE: 102 MMHG | HEART RATE: 84 BPM | DIASTOLIC BLOOD PRESSURE: 72 MMHG

## 2019-12-11 DIAGNOSIS — S72.301A CLOSED FRACTURE OF SHAFT OF RIGHT FEMUR, UNSPECIFIED FRACTURE MORPHOLOGY, INITIAL ENCOUNTER (HCC): ICD-10-CM
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
## 2019-12-11 PROCEDURE — 99214 OFFICE O/P EST MOD 30 MIN: CPT | Performed by: FAMILY MEDICINE

## 2019-12-11 RX ORDER — TRAZODONE HYDROCHLORIDE 50 MG/1
100 TABLET ORAL NIGHTLY PRN
Qty: 60 TABLET | Refills: 0 | COMMUNITY
Start: 2019-12-11 | End: 2020-03-16

## 2019-12-11 RX ORDER — ERGOCALCIFEROL 1.25 MG/1
50000 CAPSULE ORAL WEEKLY
Qty: 12 CAPSULE | Refills: 1 | Status: SHIPPED | OUTPATIENT
Start: 2019-12-11 | End: 2020-05-27
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
## 2019-12-13 ENCOUNTER — APPOINTMENT (OUTPATIENT)
Dept: ULTRASOUND IMAGING | Age: 54
End: 2019-12-13
Attending: FAMILY MEDICINE
Payer: COMMERCIAL

## 2019-12-13 ENCOUNTER — HOSPITAL ENCOUNTER (OUTPATIENT)
Dept: ULTRASOUND IMAGING | Age: 54
Discharge: HOME | End: 2019-12-13
Attending: FAMILY MEDICINE
Payer: COMMERCIAL

## 2019-12-13 ENCOUNTER — HOSPITAL ENCOUNTER (OUTPATIENT)
Dept: MAMMOGRAPHY | Age: 54
Discharge: HOME | End: 2019-12-13
Attending: FAMILY MEDICINE
Payer: COMMERCIAL

## 2019-12-13 ENCOUNTER — APPOINTMENT (OUTPATIENT)
Dept: MAMMOGRAPHY | Age: 54
End: 2019-12-13
Attending: FAMILY MEDICINE
Payer: COMMERCIAL

## 2019-12-13 DIAGNOSIS — R92.8 ABNORMAL MAMMOGRAM: ICD-10-CM

## 2019-12-13 DIAGNOSIS — N83.201 OVARIAN CYST, RIGHT: ICD-10-CM

## 2019-12-13 PROCEDURE — 76830 TRANSVAGINAL US NON-OB: CPT | Performed by: FAMILY MEDICINE

## 2019-12-13 PROCEDURE — 77066 DX MAMMO INCL CAD BI: CPT | Performed by: FAMILY MEDICINE

## 2019-12-13 PROCEDURE — 76856 US EXAM PELVIC COMPLETE: CPT | Performed by: FAMILY MEDICINE

## 2019-12-13 PROCEDURE — 77062 BREAST TOMOSYNTHESIS BI: CPT | Performed by: FAMILY MEDICINE

## 2019-12-13 PROCEDURE — 76642 ULTRASOUND BREAST LIMITED: CPT | Performed by: FAMILY MEDICINE

## 2019-12-16 PROBLEM — M17.11 PRIMARY OSTEOARTHRITIS OF RIGHT KNEE: Status: ACTIVE | Noted: 2019-12-16

## 2019-12-16 PROBLEM — M17.12 PRIMARY OSTEOARTHRITIS OF LEFT KNEE: Status: ACTIVE | Noted: 2019-12-16

## 2019-12-17 ENCOUNTER — TELEPHONE (OUTPATIENT)
Dept: FAMILY MEDICINE CLINIC | Facility: CLINIC | Age: 54
End: 2019-12-17

## 2019-12-17 NOTE — TELEPHONE ENCOUNTER
----- Message from Brissa Jarquin DO sent at 12/17/2019  1:48 PM CST -----  Notified of results, she has a mildly complex cyst of the right ovary, would recheck in 6 months according to  Radiology recommendation

## 2019-12-19 ENCOUNTER — TELEPHONE (OUTPATIENT)
Dept: FAMILY MEDICINE CLINIC | Facility: CLINIC | Age: 54
End: 2019-12-19
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
## 2019-12-19 DIAGNOSIS — S72.301A CLOSED FRACTURE OF SHAFT OF RIGHT FEMUR, UNSPECIFIED FRACTURE MORPHOLOGY, INITIAL ENCOUNTER (HCC): Primary | ICD-10-CM

## 2019-12-19 NOTE — TELEPHONE ENCOUNTER
Received via regular mail, medical records request from [facility] for records regarding pt from 09/01/2018 to present. Will forward to [employer] today, made copy of request and bar code to this note and sent to scanning today also.

## 2019-12-19 NOTE — TELEPHONE ENCOUNTER
Pt states she is at [employer] to see - orthopedic at [facility].     Follow up visit after inpatient stay for 3 weeks     # [phone] Fax  phone# [phone]    Physical medicine rehabilitation specialist.   REferral # [id number]    JACKY

## 2019-12-26 NOTE — TELEPHONE ENCOUNTER
Alice Linares referral approved. Details in Epic. Start Date 12/19/19 through 3/18/20.  1 visits authorized. Referral #[id number]. Patient advised of approval. Is grateful for our efforts.

## 2020-01-02 ENCOUNTER — TELEPHONE (OUTPATIENT)
Dept: FAMILY MEDICINE CLINIC | Facility: CLINIC | Age: 55
End: 2020-01-02

## 2020-01-20 RX ORDER — METHOCARBAMOL 500 MG/1
500 TABLET, FILM COATED ORAL 2 TIMES DAILY
Qty: 60 TABLET | Refills: 0 | Status: SHIPPED | OUTPATIENT
Start: 2020-01-20 | End: 2020-02-19

## 2020-01-20 NOTE — TELEPHONE ENCOUNTER
PT CALLED AND ADV THAT SHE NEEDS A LETTER THAT SHE IS NOT ABLE TO RETURN BACK TO WORK UNTIL LIKE September    ALSO NEEDS AN ORDER FOR DAY THERAPY FOR Julian Nunez      PT ALSO NEEDS REFILL OF     methocarbamol 500 MG Oral Tab    PLEASE SEND TO ELIE GEE

## 2020-01-20 NOTE — TELEPHONE ENCOUNTER
Pt was advised - that DS can not write letter- ortho needs to be addressing that and her rreturn to work status.  Pt verbalized understanding    Pt also needs to send us copy of new insurance- we do not have correct insurance on file    Please send refill o

## 2020-02-19 RX ORDER — METHOCARBAMOL 500 MG/1
TABLET, FILM COATED ORAL
Qty: 60 TABLET | Refills: 0 | Status: SHIPPED | OUTPATIENT
Start: 2020-02-19 | End: 2020-07-15

## 2020-03-16 RX ORDER — LORAZEPAM 1 MG/1
1 TABLET ORAL EVERY 6 HOURS PRN
Qty: 30 TABLET | Refills: 0 | Status: SHIPPED | OUTPATIENT
Start: 2020-03-16 | End: 2020-03-16

## 2020-03-16 RX ORDER — LORAZEPAM 1 MG/1
1 TABLET ORAL EVERY 8 HOURS PRN
Qty: 90 TABLET | Refills: 0 | Status: SHIPPED | OUTPATIENT
Start: 2020-03-16 | End: 2020-07-02

## 2020-03-16 RX ORDER — HYDROCODONE BITARTRATE AND ACETAMINOPHEN 10; 325 MG/1; MG/1
1 TABLET ORAL EVERY 6 HOURS PRN
Qty: 50 TABLET | Refills: 0 | Status: SHIPPED | OUTPATIENT
Start: 2020-03-16 | End: 2020-03-23

## 2020-03-16 RX ORDER — TRAZODONE HYDROCHLORIDE 50 MG/1
100 TABLET ORAL NIGHTLY PRN
Qty: 60 TABLET | Refills: 0 | Status: SHIPPED | OUTPATIENT
Start: 2020-03-16 | End: 2020-08-14

## 2020-03-16 NOTE — TELEPHONE ENCOUNTER
LOV: 12/11/19   Last Refill:  Norco 11/13/19 #120 0 RF  Lorazepam 10/16/19 #30 0 Rf  Trazodone 12/11/19 #60 0 RF  Future Appointments   Date Time Provider Karly Recinos   5/22/2020 10:30 AM Mati Mejia MD MMO NP Keila Michelle

## 2020-03-16 NOTE — TELEPHONE ENCOUNTER
Pt called would like refills of      HYDROcodone-acetaminophen  MG Oral Tab     And would like anxiety medication that PT was taking previously. Pt also asked if we could look and see what else is needing to be refilled soon and send that too.   Pt

## 2020-03-16 NOTE — TELEPHONE ENCOUNTER
Pharmacy faxed over request the medication is only allowed 3 times a day under pt plan -new script sent    Please sign script

## 2020-03-19 ENCOUNTER — TELEPHONE (OUTPATIENT)
Dept: FAMILY MEDICINE CLINIC | Facility: CLINIC | Age: 55
End: 2020-03-19

## 2020-03-19 NOTE — TELEPHONE ENCOUNTER
[facility] and all they could say is that it is because pt has had Benzo use    No insurance information on file    Spoke with pt she is going to try to fax it to the office- Rn to start PA once it gets here

## 2020-03-20 RX ORDER — ESCITALOPRAM OXALATE 20 MG/1
TABLET ORAL
Qty: 90 TABLET | Refills: 1 | Status: SHIPPED | OUTPATIENT
Start: 2020-03-20 | End: 2020-09-10

## 2020-03-23 RX ORDER — HYDROCODONE BITARTRATE AND ACETAMINOPHEN 10; 325 MG/1; MG/1
1 TABLET ORAL EVERY 6 HOURS PRN
Qty: 50 TABLET | Refills: 0 | Status: SHIPPED | OUTPATIENT
Start: 2020-03-23 | End: 2020-05-26

## 2020-03-23 NOTE — TELEPHONE ENCOUNTER
She asking for a refill of HYDROcodone-acetaminophen  MG Oral Tab       dianelys [address]
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
## 2020-03-24 NOTE — TELEPHONE ENCOUNTER
Received notification from [employer] stating \"PRIOR AUTHORIZATION IS REQUIRED FOR THIS OPIOID BECAUSE PATIENT HAS PICKED UP BENZO WITHIN THE LAST 30 DAYS PER INSURANCE COMPANY. (LORAZEPAM 3/16 PICKED UP).   Confirmed with [employer] staff member that PA is

## 2020-03-25 NOTE — TELEPHONE ENCOUNTER
Received fax from [facility]. Request ID: [id number]  Case Number: [id number]    States the med approved. Effective 2/1/20 through 5/25/20. Pharmacy notified.

## 2020-04-06 ENCOUNTER — TELEPHONE (OUTPATIENT)
Dept: FAMILY MEDICINE CLINIC | Facility: CLINIC | Age: 55
End: 2020-04-06

## 2020-04-06 NOTE — TELEPHONE ENCOUNTER
Pt called she wanted to make Dr aware that she reapplied for disability and he might be getting some paperwork from them.

## 2020-05-26 RX ORDER — HYDROCODONE BITARTRATE AND ACETAMINOPHEN 10; 325 MG/1; MG/1
1 TABLET ORAL EVERY 6 HOURS PRN
Qty: 50 TABLET | Refills: 0 | Status: SHIPPED | OUTPATIENT
Start: 2020-05-26 | End: 2020-07-06

## 2020-05-26 NOTE — TELEPHONE ENCOUNTER
LOV: 12/11/19   Last Refill: 12/11/19  #12 1 RF    Future Appointments   Date Time Provider Karly Recinos   5/28/2020  3:30 PM Mati Mejia MD MMO NP ORTHO [facility] NPV RCK     No results found for: VITD, QVITD, VITD25, [facility]

## 2020-05-26 NOTE — TELEPHONE ENCOUNTER
LOV: 12/11/19   Last Refill: 3/23/20 #50 0 RF    Future Appointments   Date Time Provider Karly Recinos   5/28/2020  3:30 PM Erin Vale MD MMO NP ORTHO [facility]CK

## 2020-05-27 RX ORDER — ERGOCALCIFEROL 1.25 MG/1
CAPSULE ORAL
Qty: 12 CAPSULE | Refills: 1 | Status: SHIPPED | OUTPATIENT
Start: 2020-05-27 | End: 2021-03-04

## 2020-06-12 ENCOUNTER — TELEPHONE (OUTPATIENT)
Dept: FAMILY MEDICINE CLINIC | Facility: CLINIC | Age: 55
End: 2020-06-12

## 2020-06-12 NOTE — TELEPHONE ENCOUNTER
Pt is asking that we call her ins and get this medication approved. Asked for phone number was advised we should have it.   HYDROcodone-acetaminophen  MG Oral Tab

## 2020-06-12 NOTE — TELEPHONE ENCOUNTER
Called pharmacy, insurance only allows 7 day supply at a time to be dispensed. Otherwise needs PA completed.      PA started on cover my meds    ct cabral (Key: [id number])

## 2020-06-16 NOTE — TELEPHONE ENCOUNTER
Called [employer] to initiate appeal at [phone] and spoke to jovany. Redetermination of benefits done. Will expedite appeal-should have response in 24hrs  Ref #: [id number]    Patient advised.  Verbalized understanding.     (also with [employer] it is $11)

## 2020-06-16 NOTE — TELEPHONE ENCOUNTER
Received fax from [address] [place] [address],[address] requesting chart notes. Per DS send his last 2 clinical notes and physical therapy notes.      Faxed notes from [address] [place] [address] Tye Thornton under media tab dated 1/7/20 and 12/4/19    Faxed back to [employer]

## 2020-06-16 NOTE — TELEPHONE ENCOUNTER
----- Message from Rudy Flavors sent at 6/16/2020 11:26 AM CDT -----  Pt called about medication auth. Please call back.  Thanks
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
## 2020-06-16 NOTE — TELEPHONE ENCOUNTER
Norco script was denied.  Would you like to do an appeal?    Or Insurance will only cover a 7 day supply at a time to be dispensed

## 2020-06-17 NOTE — TELEPHONE ENCOUNTER
Request ID [id number]  Case # [id number]    Sophia Avilez 10/325mg is approved 3/17/20 through 7/17/2020    Pt was advised- verbalized understanding    Pt asked to schedule video visit with DS on Friday to go over Pt options and pain  Sienna BOYLE Convey verbally consen

## 2020-06-17 NOTE — TELEPHONE ENCOUNTER
Abraham Martin from [place] called. Wanted to know if prescription drug monitoring program in effect. Advised that yes it is. Stated that she will put that in notes and should be able to approve med.

## 2020-06-19 ENCOUNTER — TELEPHONE (OUTPATIENT)
Dept: FAMILY MEDICINE CLINIC | Facility: CLINIC | Age: 55
End: 2020-06-19

## 2020-06-19 DIAGNOSIS — M17.11 PRIMARY OSTEOARTHRITIS OF RIGHT KNEE: ICD-10-CM

## 2020-06-19 DIAGNOSIS — M17.12 PRIMARY OSTEOARTHRITIS OF LEFT KNEE: Primary | ICD-10-CM

## 2020-06-19 NOTE — PROGRESS NOTES
This is a telemedicine visit with live, interactive video and audio. Patient understands and accepts financial responsibility for any deductible, co-insurance and/or co-pays associated with this service.     Peg Echavarria is currently recuperati Fibrocystic breast disease    • Fibromyalgia    • Obesity, unspecified    • PCOS (polycystic ovarian syndrome)    • Visual impairment     glasses      Past Surgical History:   Procedure Laterality Date   • FEMUR IM NAIL Right 9/10/2019    Performed by Brigid Norris, 2.0 standard drinks      Comment: occ    Drug use: No         Phosphoric Acid         RASH    Comment:Rapid heart rate  Terbutaline                 Comment:Swelling/tachycardia-given to pt for premature             labor   Current Outpatient Medications ENDOCRINOLOGY - INTERNAL    SPENT 48 MINUTES WITH THE PATIENT IN CONSULTATION, REVIEW OF HER MEDICAL ISSUES IMAGING AND FOLLOW UP. REFERRAL PLACED FOR DR. CARRENO FOR HER THYROID  REFERRAL PLACED FOR ENT EVAL REGARDING HER SWALLOWING ISSUES  INSTRUCTED SHE CA

## 2020-06-19 NOTE — TELEPHONE ENCOUNTER
Pt forgot to ask for a referral today in her appt for her Knee.    Please return call to [phone]

## 2020-06-22 NOTE — TELEPHONE ENCOUNTER
LM for pt to call back- need copy of her Medicaid card on file to process referrals.     Pt can send via [employer]

## 2020-07-02 RX ORDER — LORAZEPAM 1 MG/1
TABLET ORAL
Qty: 90 TABLET | Refills: 0 | Status: SHIPPED | OUTPATIENT
Start: 2020-07-02 | End: 2020-11-04

## 2020-07-06 ENCOUNTER — TELEPHONE (OUTPATIENT)
Dept: FAMILY MEDICINE CLINIC | Facility: CLINIC | Age: 55
End: 2020-07-06

## 2020-07-06 RX ORDER — HYDROCODONE BITARTRATE AND ACETAMINOPHEN 10; 325 MG/1; MG/1
1 TABLET ORAL EVERY 6 HOURS PRN
Qty: 50 TABLET | Refills: 0 | Status: SHIPPED | OUTPATIENT
Start: 2020-07-15 | End: 2020-08-03

## 2020-07-06 NOTE — TELEPHONE ENCOUNTER
RN returned call to pt- Pt states she is still on Medicaid replacement     Pt states she will need refill sent to local pharmacy     Pt was on medicaid- but now she is on [employer] [place] [phone] if we need to call in authorization.

## 2020-07-06 NOTE — TELEPHONE ENCOUNTER
Pt got a form from [employer] saying her  HYDROcodone-acetaminophen  MG Oral Tab  Should be sent to [address] [place] [address]    She doesn't need it filled right now but they need to know what she takes. She should be good until the 15th.      Please return call to 6

## 2020-07-07 ENCOUNTER — TELEPHONE (OUTPATIENT)
Dept: FAMILY MEDICINE CLINIC | Facility: CLINIC | Age: 55
End: 2020-07-07

## 2020-07-07 DIAGNOSIS — Z20.822 ENCOUNTER FOR SCREENING LABORATORY TESTING FOR COVID-19 VIRUS IN ASYMPTOMATIC PATIENT: Primary | ICD-10-CM

## 2020-07-07 DIAGNOSIS — Z20.822 ENCOUNTER FOR SCREENING LABORATORY TESTING FOR COVID-19 VIRUS: Primary | ICD-10-CM

## 2020-07-07 DIAGNOSIS — Z71.84 ENCOUNTER FOR COUNSELING FOR TRAVEL: ICD-10-CM

## 2020-07-07 NOTE — TELEPHONE ENCOUNTER
Pt states she spoke to the [employer] hotline and they told her that DS can place orders for Travel- can you please advise?

## 2020-07-07 NOTE — TELEPHONE ENCOUNTER
Pt called she is going to be traveling out the country. She is needing to get a covid test prior to traveling  She is leaving next Thursday but needs to have it done by Monday.

## 2020-07-13 ENCOUNTER — LAB ENCOUNTER (OUTPATIENT)
Dept: LAB | Facility: HOSPITAL | Age: 55
End: 2020-07-13
Attending: FAMILY MEDICINE
Payer: MEDICAID

## 2020-07-13 DIAGNOSIS — Z20.822 ENCOUNTER FOR SCREENING LABORATORY TESTING FOR COVID-19 VIRUS: ICD-10-CM

## 2020-07-13 DIAGNOSIS — Z71.84 ENCOUNTER FOR COUNSELING FOR TRAVEL: ICD-10-CM

## 2020-07-15 ENCOUNTER — TELEPHONE (OUTPATIENT)
Dept: FAMILY MEDICINE CLINIC | Facility: CLINIC | Age: 55
End: 2020-07-15

## 2020-07-15 RX ORDER — METHOCARBAMOL 500 MG/1
TABLET, FILM COATED ORAL
Qty: 60 TABLET | Refills: 0 | Status: SHIPPED | OUTPATIENT
Start: 2020-07-15 | End: 2020-09-12

## 2020-07-15 NOTE — TELEPHONE ENCOUNTER
Spoke to pt, let her know we have not got any thing back yet. Advised pt this can take up to 72 hours- 5 days depending where test was sent to. Told her to keep an eye out for it on MyChart. Verbally understood with no further questions.

## 2020-07-15 NOTE — TELEPHONE ENCOUNTER
Pt called, Do we have the results of the covid test pt had done this past Monday?   Please call pt at [phone]

## 2020-07-17 LAB — SARS-COV-2 BY PCR: NOT DETECTED

## 2020-07-27 ENCOUNTER — TELEPHONE (OUTPATIENT)
Dept: FAMILY MEDICINE CLINIC | Facility: CLINIC | Age: 55
End: 2020-07-27

## 2020-07-27 NOTE — TELEPHONE ENCOUNTER
Received a request for medical records from [address] [place] [address]. Requesting Records from 11-01-19 to present. Request sent to [employer].

## 2020-07-29 ENCOUNTER — TELEPHONE (OUTPATIENT)
Dept: FAMILY MEDICINE CLINIC | Facility: CLINIC | Age: 55
End: 2020-07-29

## 2020-07-31 ENCOUNTER — TELEPHONE (OUTPATIENT)
Dept: FAMILY MEDICINE CLINIC | Facility: CLINIC | Age: 55
End: 2020-07-31

## 2020-07-31 NOTE — TELEPHONE ENCOUNTER
Pt called her insurance states they will not pay for HYDROcodone-acetaminophen  MG Oral Tab as they see on their end that she is taking another medication (benzo?- she is not certain of the name but said if she takes both medications she could die)

## 2020-08-03 RX ORDER — HYDROCODONE BITARTRATE AND ACETAMINOPHEN 10; 325 MG/1; MG/1
1 TABLET ORAL EVERY 6 HOURS PRN
Qty: 50 TABLET | Refills: 0 | Status: SHIPPED | OUTPATIENT
Start: 2020-08-03 | End: 2020-09-14

## 2020-08-03 NOTE — TELEPHONE ENCOUNTER
SO, It's the lorazepam and yes if you take too much of either it can have some untoward consequences.  I think we may be able to override this and it's because it's the state and  The current narcotic crisis that is holding it up

## 2020-08-03 NOTE — TELEPHONE ENCOUNTER
Dominga notified of below. States the prescription needs to be sent to her local pharmacy, not the mail in. Please advise.

## 2020-08-06 ENCOUNTER — TELEPHONE (OUTPATIENT)
Dept: FAMILY MEDICINE CLINIC | Facility: CLINIC | Age: 55
End: 2020-08-06

## 2020-08-06 NOTE — TELEPHONE ENCOUNTER
Forward to Dr. Grace Mejia, please advise if pt still needs the following orders:    DAVI Ward, Sumaya Hyatt Nurse             Vaginal US     If she does, please place order and send back to nurse pool to send pt a reminder letter.

## 2020-08-14 ENCOUNTER — TELEPHONE (OUTPATIENT)
Dept: FAMILY MEDICINE CLINIC | Facility: CLINIC | Age: 55
End: 2020-08-14

## 2020-08-14 RX ORDER — TRAZODONE HYDROCHLORIDE 50 MG/1
TABLET ORAL
Qty: 60 TABLET | Refills: 3 | Status: SHIPPED | OUTPATIENT
Start: 2020-08-14 | End: 2020-12-09

## 2020-08-14 NOTE — TELEPHONE ENCOUNTER
No refill protocol for this medication. Last refill: 3/16/2020 #60 with 0 refills  Last Visit: 6/19/2020 Telemed visit  Next Visit: No future appointments. Forward to Dr. Lars Morgan please advise on refills. Thanks.

## 2020-08-25 ENCOUNTER — TELEPHONE (OUTPATIENT)
Dept: FAMILY MEDICINE CLINIC | Facility: CLINIC | Age: 55
End: 2020-08-25

## 2020-08-25 NOTE — TELEPHONE ENCOUNTER
Sienna BOYLE Convey verbally {consents to a Virtual/Telephone Check-In service on 8/26/20.   Patient understands and accepts financial responsibility for any deductible, co-insurance and/or co-pays associated with this service

## 2020-08-26 ENCOUNTER — TELEMEDICINE (OUTPATIENT)
Dept: FAMILY MEDICINE CLINIC | Facility: CLINIC | Age: 55
End: 2020-08-26
Payer: MEDICAID

## 2020-08-26 DIAGNOSIS — M62.81 MUSCLE WEAKNESS OF LOWER EXTREMITY: ICD-10-CM

## 2020-08-26 DIAGNOSIS — S72.301D CLOSED FRACTURE OF SHAFT OF RIGHT FEMUR WITH ROUTINE HEALING, UNSPECIFIED FRACTURE MORPHOLOGY, SUBSEQUENT ENCOUNTER: ICD-10-CM

## 2020-08-26 DIAGNOSIS — S72.301A CLOSED FRACTURE OF SHAFT OF RIGHT FEMUR, UNSPECIFIED FRACTURE MORPHOLOGY, INITIAL ENCOUNTER (HCC): Primary | ICD-10-CM

## 2020-08-26 PROCEDURE — 99213 OFFICE O/P EST LOW 20 MIN: CPT | Performed by: FAMILY MEDICINE
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
## 2020-09-10 RX ORDER — ESCITALOPRAM OXALATE 20 MG/1
TABLET ORAL
Qty: 90 TABLET | Refills: 1 | Status: SHIPPED | OUTPATIENT
Start: 2020-09-10 | End: 2021-03-09

## 2020-09-10 NOTE — TELEPHONE ENCOUNTER
Protocol: none  Last refilled 3/20/20 #90 with 1 RF  Telemed visit with DS 8/26/20  No future appt  Routed to PCP to advise

## 2020-09-12 RX ORDER — METHOCARBAMOL 500 MG/1
TABLET, FILM COATED ORAL
Qty: 60 TABLET | Refills: 0 | Status: SHIPPED | OUTPATIENT
Start: 2020-09-12 | End: 2020-11-11

## 2020-09-12 NOTE — TELEPHONE ENCOUNTER
Routing to provider per protocol. Last refilled on 7/15/20 for # 60 with 0 rf. Last seen on 8/26/20. No future appointments. Thank you.

## 2020-09-14 RX ORDER — HYDROCODONE BITARTRATE AND ACETAMINOPHEN 10; 325 MG/1; MG/1
1 TABLET ORAL EVERY 6 HOURS PRN
Qty: 50 TABLET | Refills: 0 | Status: SHIPPED | OUTPATIENT
Start: 2020-09-14 | End: 2020-10-14

## 2020-09-14 NOTE — TELEPHONE ENCOUNTER
Patient would like a refill on her pain medication called into [facility] on [address]. Patient made an apt to come in for a physical at the end of the month. Please call back.

## 2020-09-14 NOTE — TELEPHONE ENCOUNTER
LOV: 8/26/20   Last Refill: 8/3/20 #50 0 RF    Future Appointments   Date Time Provider Karly Recinos   9/28/2020  3:00 PM Edilson Catalan, [facility] [place]

## 2020-09-17 ENCOUNTER — TELEPHONE (OUTPATIENT)
Dept: FAMILY MEDICINE CLINIC | Facility: CLINIC | Age: 55
End: 2020-09-17

## 2020-09-17 NOTE — TELEPHONE ENCOUNTER
She is asking for a new referral for a back x-ray  The one she is trying to use has  please let her know when it is done

## 2020-09-25 ENCOUNTER — HOSPITAL ENCOUNTER (OUTPATIENT)
Dept: GENERAL RADIOLOGY | Age: 55
Discharge: HOME | End: 2020-09-25
Attending: FAMILY MEDICINE
Payer: MEDICAID

## 2020-09-25 DIAGNOSIS — M54.41 ACUTE RIGHT-SIDED LOW BACK PAIN WITH RIGHT-SIDED SCIATICA: ICD-10-CM

## 2020-09-25 DIAGNOSIS — S72.301D CLOSED FRACTURE OF SHAFT OF RIGHT FEMUR WITH ROUTINE HEALING, UNSPECIFIED FRACTURE MORPHOLOGY, SUBSEQUENT ENCOUNTER: ICD-10-CM

## 2020-09-25 DIAGNOSIS — S72.301A CLOSED FRACTURE OF SHAFT OF RIGHT FEMUR, UNSPECIFIED FRACTURE MORPHOLOGY, INITIAL ENCOUNTER (HCC): ICD-10-CM

## 2020-09-25 PROCEDURE — 72110 X-RAY EXAM L-2 SPINE 4/>VWS: CPT | Performed by: FAMILY MEDICINE

## 2020-09-28 ENCOUNTER — OFFICE VISIT (OUTPATIENT)
Dept: FAMILY MEDICINE CLINIC | Facility: CLINIC | Age: 55
End: 2020-09-28
Payer: MEDICAID

## 2020-09-28 VITALS
TEMPERATURE: 97 F | BODY MASS INDEX: 36.67 KG/M2 | RESPIRATION RATE: 16 BRPM | HEIGHT: 67 IN | WEIGHT: 233.63 LBS | HEART RATE: 68 BPM | SYSTOLIC BLOOD PRESSURE: 110 MMHG | DIASTOLIC BLOOD PRESSURE: 78 MMHG

## 2020-09-28 DIAGNOSIS — M54.16 LUMBAR RADICULOPATHY: ICD-10-CM

## 2020-09-28 DIAGNOSIS — Z11.3 SCREENING FOR STD (SEXUALLY TRANSMITTED DISEASE): ICD-10-CM

## 2020-09-28 DIAGNOSIS — Z00.00 ROUTINE HEALTH MAINTENANCE: ICD-10-CM

## 2020-09-28 DIAGNOSIS — R87.618 PAP SMEAR ABNORMALITY OF CERVIX/HUMAN PAPILLOMAVIRUS (HPV) POSITIVE: ICD-10-CM

## 2020-09-28 DIAGNOSIS — Z12.4 ENCOUNTER FOR PAPANICOLAOU SMEAR FOR CERVICAL CANCER SCREENING: Primary | ICD-10-CM

## 2020-09-28 DIAGNOSIS — T17.908A ASPIRATION INTO AIRWAY, INITIAL ENCOUNTER: ICD-10-CM

## 2020-09-28 DIAGNOSIS — N63.12 MASS OF UPPER INNER QUADRANT OF RIGHT BREAST: ICD-10-CM

## 2020-09-28 DIAGNOSIS — Z20.822 ENCOUNTER FOR PREPROCEDURE SCREENING LABORATORY TESTING FOR COVID-19: ICD-10-CM

## 2020-09-28 DIAGNOSIS — M62.830 LUMBAR PARASPINAL MUSCLE SPASM: ICD-10-CM

## 2020-09-28 DIAGNOSIS — Z01.812 ENCOUNTER FOR PREPROCEDURE SCREENING LABORATORY TESTING FOR COVID-19: ICD-10-CM

## 2020-09-28 LAB
ALBUMIN SERPL-MCNC: 4 G/DL (ref 3.4–5)
ALBUMIN/GLOB SERPL: 0.9 {RATIO} (ref 1–2)
ALP LIVER SERPL-CCNC: 117 U/L
ALT SERPL-CCNC: 14 U/L
ANION GAP SERPL CALC-SCNC: 5 MMOL/L (ref 0–18)
AST SERPL-CCNC: 18 U/L (ref 15–37)
BASOPHILS # BLD AUTO: 0.07 X10(3) UL (ref 0–0.2)
BASOPHILS NFR BLD AUTO: 0.8 %
BILIRUB SERPL-MCNC: 0.4 MG/DL (ref 0.1–2)
BUN BLD-MCNC: 14 MG/DL (ref 7–18)
BUN/CREAT SERPL: 19.2 (ref 10–20)
CALCIUM BLD-MCNC: 9.4 MG/DL (ref 8.5–10.1)
CHLORIDE SERPL-SCNC: 104 MMOL/L (ref 98–112)
CHOLEST SMN-MCNC: 172 MG/DL (ref ?–200)
CO2 SERPL-SCNC: 28 MMOL/L (ref 21–32)
CREAT BLD-MCNC: 0.73 MG/DL
DEPRECATED RDW RBC AUTO: 45.8 FL (ref 35.1–46.3)
EOSINOPHIL # BLD AUTO: 0.26 X10(3) UL (ref 0–0.7)
EOSINOPHIL NFR BLD AUTO: 2.9 %
ERYTHROCYTE [DISTWIDTH] IN BLOOD BY AUTOMATED COUNT: 13.8 % (ref 11–15)
GLOBULIN PLAS-MCNC: 4.5 G/DL (ref 2.8–4.4)
GLUCOSE BLD-MCNC: 79 MG/DL (ref 70–99)
HCT VFR BLD AUTO: 42.9 %
HDLC SERPL-MCNC: 66 MG/DL (ref 40–59)
HGB BLD-MCNC: 13.2 G/DL
IMM GRANULOCYTES # BLD AUTO: 0.01 X10(3) UL (ref 0–1)
IMM GRANULOCYTES NFR BLD: 0.1 %
LDLC SERPL CALC-MCNC: 89 MG/DL (ref ?–100)
LYMPHOCYTES # BLD AUTO: 2.73 X10(3) UL (ref 1–4)
LYMPHOCYTES NFR BLD AUTO: 30 %
M PROTEIN MFR SERPL ELPH: 8.5 G/DL (ref 6.4–8.2)
MCH RBC QN AUTO: 27.7 PG (ref 26–34)
MCHC RBC AUTO-ENTMCNC: 30.8 G/DL (ref 31–37)
MCV RBC AUTO: 90.1 FL
MONOCYTES # BLD AUTO: 0.83 X10(3) UL (ref 0.1–1)
MONOCYTES NFR BLD AUTO: 9.1 %
NEUTROPHILS # BLD AUTO: 5.2 X10 (3) UL (ref 1.5–7.7)
NEUTROPHILS # BLD AUTO: 5.2 X10(3) UL (ref 1.5–7.7)
NEUTROPHILS NFR BLD AUTO: 57.1 %
NONHDLC SERPL-MCNC: 106 MG/DL (ref ?–130)
OSMOLALITY SERPL CALC.SUM OF ELEC: 283 MOSM/KG (ref 275–295)
PATIENT FASTING Y/N/NP: NO
PATIENT FASTING Y/N/NP: NO
PLATELET # BLD AUTO: 360 10(3)UL (ref 150–450)
POTASSIUM SERPL-SCNC: 3.7 MMOL/L (ref 3.5–5.1)
RBC # BLD AUTO: 4.76 X10(6)UL
SODIUM SERPL-SCNC: 137 MMOL/L (ref 136–145)
TRIGL SERPL-MCNC: 86 MG/DL (ref 30–149)
TSI SER-ACNC: 0.96 MIU/ML (ref 0.36–3.74)
VLDLC SERPL CALC-MCNC: 17 MG/DL (ref 0–30)
WBC # BLD AUTO: 9.1 X10(3) UL (ref 4–11)

## 2020-09-28 PROCEDURE — 3074F SYST BP LT 130 MM HG: CPT | Performed by: FAMILY MEDICINE

## 2020-09-28 PROCEDURE — 85025 COMPLETE CBC W/AUTO DIFF WBC: CPT | Performed by: FAMILY MEDICINE

## 2020-09-28 PROCEDURE — 88175 CYTOPATH C/V AUTO FLUID REDO: CPT | Performed by: FAMILY MEDICINE

## 2020-09-28 PROCEDURE — 3078F DIAST BP <80 MM HG: CPT | Performed by: FAMILY MEDICINE

## 2020-09-28 PROCEDURE — 87624 HPV HI-RISK TYP POOLED RSLT: CPT | Performed by: FAMILY MEDICINE

## 2020-09-28 PROCEDURE — 80053 COMPREHEN METABOLIC PANEL: CPT | Performed by: FAMILY MEDICINE

## 2020-09-28 PROCEDURE — 87389 HIV-1 AG W/HIV-1&-2 AB AG IA: CPT | Performed by: FAMILY MEDICINE

## 2020-09-28 PROCEDURE — 3008F BODY MASS INDEX DOCD: CPT | Performed by: FAMILY MEDICINE

## 2020-09-28 PROCEDURE — 99396 PREV VISIT EST AGE 40-64: CPT | Performed by: FAMILY MEDICINE

## 2020-09-28 PROCEDURE — 84443 ASSAY THYROID STIM HORMONE: CPT | Performed by: FAMILY MEDICINE

## 2020-09-28 PROCEDURE — 80061 LIPID PANEL: CPT | Performed by: FAMILY MEDICINE

## 2020-09-28 NOTE — PROGRESS NOTES
HPI:   Amelia Liriano is a 47year old female who presents for a complete physical exam. Symptoms: denies discharge, itching, burning or dysuria, is due for her PAP.  Patient complains of having leg pain, on the right, and trying to get through PT for he every 6 (six) hours as needed.  50 tablet 0   • METHOCARBAMOL 500 MG Oral Tab TAKE 1 TABLET(500 MG) BY MOUTH TWICE DAILY 60 tablet 0   • ESCITALOPRAM 20 MG Oral Tab TAKE 1 TABLET(20 MG) BY MOUTH DAILY 90 tablet 1   • TRAZODONE HCL 50 MG Oral Tab TAKE 2 TABL • STEREOTACTIC BREAST BIOPSY     • SYNVISC INJECTION OF BILATERAL KNEE JOINT Bilateral 6/9/2016    Performed by Naomi Stoll MD at [facility] MAIN OR   • TUBAL LIGATION        Family History   Problem Relation Age of Onset   • Heart Disorder Father    • Hyp clear  NECK: supple,no adenopathy,no bruits  CHEST: no chest tenderness  BREAST: no dominant or suspicious mass, has a small firm mass of the right upper medial breast, the right nipple is inverted as well, apparently this is baseline  LUNGS: clear to ausc physical exam. HPV  Pap and pelvic done. Order put in for mammogram and dexascan. Self breast exam explained. Health maintenance, will check fasting Lipids, CMP, and CBC. Pt referred for screening colonoscopy.  Pt info handouts given for: exercise, low fat

## 2020-09-29 ENCOUNTER — TELEPHONE (OUTPATIENT)
Dept: FAMILY MEDICINE CLINIC | Facility: CLINIC | Age: 55
End: 2020-09-29

## 2020-09-29 NOTE — TELEPHONE ENCOUNTER
----- Message from Edelmira Nair DO sent at 9/29/2020 10:52 AM CDT -----  Can notify Sienna her HIV was negative, Notify Sienna BOYLE Convey  labs looked very good lipids were  Excellent kidney, liver function, blood sugar and thyroid were all normal. Her

## 2020-09-29 NOTE — TELEPHONE ENCOUNTER
Pt was advised of results- verbalized understanding    Still waiting on PAP to come back- will call with results when available

## 2020-10-05 ENCOUNTER — TELEPHONE (OUTPATIENT)
Dept: FAMILY MEDICINE CLINIC | Facility: CLINIC | Age: 55
End: 2020-10-05

## 2020-10-05 DIAGNOSIS — R87.618 PAP SMEAR ABNORMALITY OF CERVIX/HUMAN PAPILLOMAVIRUS (HPV) POSITIVE: Primary | ICD-10-CM

## 2020-10-05 NOTE — TELEPHONE ENCOUNTER
RN spoke with lab- they did not reflex the HPV     Per Norval Flow in the lab we can order [id number] and they can add it on and reflex it today    Order added on- should have results later today

## 2020-10-07 NOTE — TELEPHONE ENCOUNTER
----- Message from Afshin Hayes DO sent at 10/7/2020  7:58 AM CDT -----  Can notify Preston Spruce her PAP came back negative for any changes and also hE HPV was negative, she's god for 3 years

## 2020-10-13 ENCOUNTER — TELEPHONE (OUTPATIENT)
Dept: FAMILY MEDICINE CLINIC | Facility: CLINIC | Age: 55
End: 2020-10-13

## 2020-10-13 DIAGNOSIS — H53.15 VISUAL DISTORTIONS OF SHAPE AND SIZE: Primary | ICD-10-CM

## 2020-10-13 NOTE — TELEPHONE ENCOUNTER
Pt states she is having \"weird\" vision- sometimes she things too big or too small?      Order is pended for Dr. Queta Haney- just needs DX

## 2020-10-14 RX ORDER — HYDROCODONE BITARTRATE AND ACETAMINOPHEN 10; 325 MG/1; MG/1
1 TABLET ORAL EVERY 6 HOURS PRN
Qty: 50 TABLET | Refills: 0 | Status: SHIPPED | OUTPATIENT
Start: 2020-10-14 | End: 2020-11-11

## 2020-10-14 NOTE — TELEPHONE ENCOUNTER
LOV: 9/28/20   Last Refill: 9/14/20 #50 0 RF    Future Appointments   Date Time Provider Karly Recinos   11/2/2020  3:15 PM Efrain Castellanos MD [facility] [address] [facility]

## 2020-10-14 NOTE — TELEPHONE ENCOUNTER
HYDROcodone-acetaminophen  MG Oral Tab      PT WOULD LIKE REFILL SENT TO   [facility] [address] [place] [address], [place] [address] AT [address] [place] [address],  [address], [phone], [phone]

## 2020-10-20 ENCOUNTER — TELEPHONE (OUTPATIENT)
Dept: FAMILY MEDICINE CLINIC | Facility: CLINIC | Age: 55
End: 2020-10-20

## 2020-10-20 NOTE — TELEPHONE ENCOUNTER
Form filled out by Dr. Alin Otero    Pt requested we mail it to the [facility]- pt provided envelope and postage. RN sent from office.     Copy made and sent to scanning

## 2020-10-21 ENCOUNTER — TELEPHONE (OUTPATIENT)
Dept: FAMILY MEDICINE CLINIC | Facility: CLINIC | Age: 55
End: 2020-10-21

## 2020-10-21 NOTE — TELEPHONE ENCOUNTER
Received via fax 10/21/20, request for medical records from [address] [place] [address], 03/01/20 to present, all records, labs, x-rays, reports, etc.  Sent to [employer] today 10/21/20, made copy of request and bar code to this note and sent to scanning toda

## 2020-10-21 NOTE — TELEPHONE ENCOUNTER
Pt called, just wanted to let us know that [employer] will be sending over some paperwork for Dr. Grant Baker to complete.

## 2020-11-03 NOTE — TELEPHONE ENCOUNTER
Received 2nd request for medical records, dup to below, Dr. Destinee Soto signed. Sent to [employer] today, made copy and bar code to this note and sent to scanning today also.

## 2020-11-04 ENCOUNTER — TELEPHONE (OUTPATIENT)
Dept: FAMILY MEDICINE CLINIC | Facility: CLINIC | Age: 55
End: 2020-11-04

## 2020-11-04 RX ORDER — LORAZEPAM 1 MG/1
1 TABLET ORAL EVERY 8 HOURS PRN
Qty: 90 TABLET | Refills: 0 | Status: SHIPPED | OUTPATIENT
Start: 2020-11-04 | End: 2021-02-19

## 2020-11-04 NOTE — TELEPHONE ENCOUNTER
Pt needs a refill of the  LORAZEPAM 1 MG Oral Tab  [facility] on [address]    Also pt would like to know what the new step is since her MRI got denied.      Please return call to [phone]

## 2020-11-10 NOTE — TELEPHONE ENCOUNTER
[address] [place] [address] and spoke with Nia Lucas Case # [id number]. States since this is a first round appeal it needs to be in writing. No PTP available.   Member delegation form needs to accompany Letter of Appeal.  States outcome usually takes a

## 2020-11-11 RX ORDER — HYDROCODONE BITARTRATE AND ACETAMINOPHEN 10; 325 MG/1; MG/1
1 TABLET ORAL EVERY 6 HOURS PRN
Qty: 50 TABLET | Refills: 0 | Status: SHIPPED | OUTPATIENT
Start: 2020-11-11 | End: 2020-12-07

## 2020-11-11 RX ORDER — METHOCARBAMOL 500 MG/1
500 TABLET, FILM COATED ORAL 2 TIMES DAILY
Qty: 60 TABLET | Refills: 0 | Status: SHIPPED | OUTPATIENT
Start: 2020-11-11 | End: 2021-02-19

## 2020-11-11 NOTE — TELEPHONE ENCOUNTER
Letter and paperwork complete. Patient advised needs to sign Authorized [employer] Form before we can fax anything. This form and all paperwork have been placed up front. Please have patient sign #6 and give to Clinical Staff to fax.   Lillette [place]

## 2020-11-11 NOTE — TELEPHONE ENCOUNTER
Routing to provider per protocol. Ltanya Rasheeda last refilled on 10/14/20 for # 50 with 0 rf. Methocarbamol last refilled on 9/12/20 for # 60 with 0 rf. Last seen on 9/28/20.      Future Appointments   Date Time Provider Karly Recinos   11/20/2020  9:30 A

## 2020-11-12 ENCOUNTER — TELEPHONE (OUTPATIENT)
Dept: FAMILY MEDICINE CLINIC | Facility: CLINIC | Age: 55
End: 2020-11-12

## 2020-11-12 NOTE — TELEPHONE ENCOUNTER
Received fax from pharmacy stating Romero Matthews requires prior authorization. Prior Authorization submitted via covermymeds. [email or web address]. Key [id number]. Will await outcome.

## 2020-11-17 ENCOUNTER — APPOINTMENT (OUTPATIENT)
Dept: LAB | Facility: HOSPITAL | Age: 55
End: 2020-11-17
Attending: FAMILY MEDICINE
Payer: MEDICAID

## 2020-11-17 DIAGNOSIS — Z20.822 ENCOUNTER FOR PREPROCEDURE SCREENING LABORATORY TESTING FOR COVID-19: ICD-10-CM

## 2020-11-17 DIAGNOSIS — Z01.812 ENCOUNTER FOR PREPROCEDURE SCREENING LABORATORY TESTING FOR COVID-19: ICD-10-CM

## 2020-11-19 ENCOUNTER — TELEPHONE (OUTPATIENT)
Dept: FAMILY MEDICINE CLINIC | Facility: CLINIC | Age: 55
End: 2020-11-19

## 2020-11-19 DIAGNOSIS — Z01.020 ENCOUNTER FOR EXAMINATION OF EYES AND VISION FOLLOWING FAILED VISION SCREENING WITHOUT ABNORMAL FINDINGS: Primary | ICD-10-CM

## 2020-11-19 NOTE — TELEPHONE ENCOUNTER
----- Message from Jaguar Milligan DO sent at 11/19/2020  6:27 AM CST -----  Notify Sienna her Covid test was positive, she should quarantine according to [facility] guidelines

## 2020-11-19 NOTE — TELEPHONE ENCOUNTER
Pt was diagnosed was Covid 11/17. She has questions about the virus and her symptoms. Pt requested a nurse call her back.

## 2020-11-19 NOTE — TELEPHONE ENCOUNTER
Pt was advised of results    Pt is not having sxs- per verbal conversation with DS pt needs to quarantine for 10 days from test.  Pt verbalized understanding

## 2020-11-19 NOTE — TELEPHONE ENCOUNTER
Rn spoke with pt - she wanted to confirm that she woiuld be cleared to go back to appointments in [place] [address].   RN states that if she is sxs free with no medication at that time she should be able to resume visits
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
## 2020-12-04 ENCOUNTER — HOSPITAL ENCOUNTER (OUTPATIENT)
Dept: GENERAL RADIOLOGY | Facility: HOSPITAL | Age: 55
Discharge: HOME | End: 2020-12-04
Attending: FAMILY MEDICINE
Payer: MEDICAID

## 2020-12-04 ENCOUNTER — TELEPHONE (OUTPATIENT)
Dept: FAMILY MEDICINE CLINIC | Facility: CLINIC | Age: 55
End: 2020-12-04

## 2020-12-04 ENCOUNTER — HOSPITAL ENCOUNTER (OUTPATIENT)
Dept: MAMMOGRAPHY | Facility: HOSPITAL | Age: 55
Discharge: HOME | End: 2020-12-04
Attending: FAMILY MEDICINE
Payer: MEDICAID

## 2020-12-04 DIAGNOSIS — M54.2 NECK PAIN, BILATERAL: Primary | ICD-10-CM

## 2020-12-04 DIAGNOSIS — N63.12 MASS OF UPPER INNER QUADRANT OF RIGHT BREAST: ICD-10-CM

## 2020-12-04 DIAGNOSIS — M54.12 CERVICAL RADICULOPATHY DUE TO TRAUMA: ICD-10-CM

## 2020-12-04 DIAGNOSIS — T17.908A ASPIRATION INTO AIRWAY, INITIAL ENCOUNTER: ICD-10-CM

## 2020-12-04 PROCEDURE — 77062 BREAST TOMOSYNTHESIS BI: CPT | Performed by: FAMILY MEDICINE

## 2020-12-04 PROCEDURE — 76642 ULTRASOUND BREAST LIMITED: CPT | Performed by: FAMILY MEDICINE

## 2020-12-04 PROCEDURE — 74230 X-RAY XM SWLNG FUNCJ C+: CPT | Performed by: FAMILY MEDICINE

## 2020-12-04 PROCEDURE — 92611 MOTION FLUOROSCOPY/SWALLOW: CPT

## 2020-12-04 PROCEDURE — 77066 DX MAMMO INCL CAD BI: CPT | Performed by: FAMILY MEDICINE
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
## 2020-12-04 NOTE — TELEPHONE ENCOUNTER
2 things. .     Pt would like to know if DS can change the MRI to include her neck. She said it is finally approved with INS.       Also she needs a refill of the  HYDROcodone-acetaminophen  MG Oral Tab    [facility] on [address]  In [place]

## 2020-12-04 NOTE — PROGRESS NOTES
ADULT VIDEOFLUOROSCOPIC SWALLOWING STUDY    Admission Date: 12/4/2020  Evaluation Date: 12/04/20  Radiologist: Dr Emelia Riojas   Diet Recommendations - Solids: Regular  Diet Recommendations - Liquid:  Thin  Aspiration Precautions: Upright position; .  Consistencies Presented: Thin liquids;Puree;Hard solid to assess oropharyngeal swallow function and assess for compensatory strategies to improve safe swallow function.     THIN LIQUIDS  Method of Presentation: Cup  Oral Phase of Swallow (VFSS - Thin Liq

## 2020-12-07 ENCOUNTER — TELEPHONE (OUTPATIENT)
Dept: FAMILY MEDICINE CLINIC | Facility: CLINIC | Age: 55
End: 2020-12-07

## 2020-12-07 DIAGNOSIS — N63.10 BREAST MASS, RIGHT: Primary | ICD-10-CM

## 2020-12-07 RX ORDER — HYDROCODONE BITARTRATE AND ACETAMINOPHEN 10; 325 MG/1; MG/1
1 TABLET ORAL EVERY 6 HOURS PRN
Qty: 50 TABLET | Refills: 0 | Status: SHIPPED | OUTPATIENT
Start: 2020-12-07 | End: 2021-02-18

## 2020-12-07 NOTE — TELEPHONE ENCOUNTER
----- Message from Agnieszka Saenz DO sent at 12/7/2020  7:50 AM CST -----  Notify Maricelal her MAMMOGRAM showed what looks to be cysts, the rest of the mammogram was negative, but they suggest we do an US in 6 months to follow the cysts, order placed, can

## 2020-12-09 RX ORDER — TRAZODONE HYDROCHLORIDE 50 MG/1
TABLET ORAL
Qty: 60 TABLET | Refills: 3 | Status: SHIPPED | OUTPATIENT
Start: 2020-12-09 | End: 2021-04-09

## 2020-12-09 NOTE — TELEPHONE ENCOUNTER
Protocol: none  Last refilled 8/14/20 #60 with 3 RF  LOV with DS 9/28/20  No future appt with PCP  Routed to PCP to advise refill

## 2020-12-15 ENCOUNTER — HOSPITAL ENCOUNTER (OUTPATIENT)
Dept: ULTRASOUND IMAGING | Facility: HOSPITAL | Age: 55
Discharge: HOME | End: 2020-12-15
Attending: INTERNAL MEDICINE
Payer: MEDICAID

## 2020-12-15 ENCOUNTER — HOSPITAL ENCOUNTER (OUTPATIENT)
Dept: GENERAL RADIOLOGY | Facility: HOSPITAL | Age: 55
Discharge: HOME | End: 2020-12-15
Attending: FAMILY MEDICINE
Payer: MEDICAID

## 2020-12-15 ENCOUNTER — TELEPHONE (OUTPATIENT)
Dept: FAMILY MEDICINE CLINIC | Facility: CLINIC | Age: 55
End: 2020-12-15

## 2020-12-15 DIAGNOSIS — E01.0 THYROMEGALY: ICD-10-CM

## 2020-12-15 DIAGNOSIS — M54.12 CERVICAL RADICULOPATHY DUE TO TRAUMA: ICD-10-CM

## 2020-12-15 DIAGNOSIS — M54.2 NECK PAIN, BILATERAL: ICD-10-CM

## 2020-12-15 DIAGNOSIS — E28.2 PCOS (POLYCYSTIC OVARIAN SYNDROME): ICD-10-CM

## 2020-12-15 PROCEDURE — 76536 US EXAM OF HEAD AND NECK: CPT | Performed by: INTERNAL MEDICINE

## 2020-12-15 PROCEDURE — 72050 X-RAY EXAM NECK SPINE 4/5VWS: CPT | Performed by: FAMILY MEDICINE

## 2020-12-15 NOTE — TELEPHONE ENCOUNTER
Pt is calling while on the phone with [facility] central scheduling- she was trying to schedule MRI of Lumbar spine.     RN advised that MRI of lumbar spine was denied back in septmber and I do not see approval in Epic    Pt states she did receive approval letter

## 2020-12-16 ENCOUNTER — TELEPHONE (OUTPATIENT)
Dept: FAMILY MEDICINE CLINIC | Facility: CLINIC | Age: 55
End: 2020-12-16

## 2020-12-16 DIAGNOSIS — U07.1 LAB TEST POSITIVE FOR DETECTION OF COVID-19 VIRUS: Primary | ICD-10-CM

## 2020-12-16 NOTE — TELEPHONE ENCOUNTER
Pt was advised of results    Pt adair she received  auth for MRI .   Auth # [id number]  [id number]  5/13/2021    She is going to send photos of Auth jeff Alvarez for RN to send to referral department    Pt also is requesting COVID antibody testing- she had a

## 2020-12-16 NOTE — TELEPHONE ENCOUNTER
----- Message from Paul Das DO sent at 12/16/2020  1:31 PM CST -----  Can notify Jermaine Downey her xray shows some mild changes on the right side of her neck, but not at a level that would cause her shoulder and arm pain.  I think at this point, we have to

## 2020-12-23 ENCOUNTER — LAB ENCOUNTER (OUTPATIENT)
Dept: LAB | Facility: HOSPITAL | Age: 55
End: 2020-12-23
Attending: FAMILY MEDICINE
Payer: MEDICAID

## 2020-12-23 DIAGNOSIS — U07.1 LAB TEST POSITIVE FOR DETECTION OF COVID-19 VIRUS: ICD-10-CM

## 2020-12-23 PROCEDURE — 36415 COLL VENOUS BLD VENIPUNCTURE: CPT

## 2020-12-23 PROCEDURE — 86769 SARS-COV-2 COVID-19 ANTIBODY: CPT

## 2020-12-24 ENCOUNTER — TELEPHONE (OUTPATIENT)
Dept: FAMILY MEDICINE CLINIC | Facility: CLINIC | Age: 55
End: 2020-12-24

## 2020-12-24 NOTE — TELEPHONE ENCOUNTER
----- Message from Kang Casillas DO sent at 12/23/2020  4:53 PM CST -----  Can notify She has antibodies to COVID

## 2020-12-26 ENCOUNTER — HOSPITAL ENCOUNTER (EMERGENCY)
Age: 55
Discharge: HOME | End: 2020-12-26
Attending: EMERGENCY MEDICINE
Payer: MEDICAID

## 2020-12-26 ENCOUNTER — APPOINTMENT (OUTPATIENT)
Dept: CT IMAGING | Age: 55
End: 2020-12-26
Attending: EMERGENCY MEDICINE
Payer: MEDICAID

## 2020-12-26 VITALS
OXYGEN SATURATION: 100 % | WEIGHT: 227 LBS | RESPIRATION RATE: 15 BRPM | BODY MASS INDEX: 36 KG/M2 | HEART RATE: 51 BPM | SYSTOLIC BLOOD PRESSURE: 120 MMHG | DIASTOLIC BLOOD PRESSURE: 69 MMHG | TEMPERATURE: 98 F

## 2020-12-26 DIAGNOSIS — B02.9 HERPES ZOSTER WITHOUT COMPLICATION: Primary | ICD-10-CM

## 2020-12-26 PROCEDURE — 99284 EMERGENCY DEPT VISIT MOD MDM: CPT

## 2020-12-26 PROCEDURE — 85025 COMPLETE CBC W/AUTO DIFF WBC: CPT | Performed by: EMERGENCY MEDICINE

## 2020-12-26 PROCEDURE — 70450 CT HEAD/BRAIN W/O DYE: CPT | Performed by: EMERGENCY MEDICINE

## 2020-12-26 PROCEDURE — 80053 COMPREHEN METABOLIC PANEL: CPT | Performed by: EMERGENCY MEDICINE

## 2020-12-26 PROCEDURE — 96374 THER/PROPH/DIAG INJ IV PUSH: CPT

## 2020-12-26 PROCEDURE — 96375 TX/PRO/DX INJ NEW DRUG ADDON: CPT

## 2020-12-26 PROCEDURE — 99285 EMERGENCY DEPT VISIT HI MDM: CPT

## 2020-12-26 RX ORDER — HYDROMORPHONE HYDROCHLORIDE 1 MG/ML
0.5 INJECTION, SOLUTION INTRAMUSCULAR; INTRAVENOUS; SUBCUTANEOUS EVERY 30 MIN PRN
Status: DISCONTINUED | OUTPATIENT
Start: 2020-12-26 | End: 2020-12-26

## 2020-12-26 RX ORDER — ACYCLOVIR 800 MG/1
800 TABLET ORAL
Qty: 50 TABLET | Refills: 0 | Status: SHIPPED | OUTPATIENT
Start: 2020-12-26 | End: 2021-01-05

## 2020-12-26 RX ORDER — ONDANSETRON 2 MG/ML
4 INJECTION INTRAMUSCULAR; INTRAVENOUS ONCE
Status: COMPLETED | OUTPATIENT
Start: 2020-12-26 | End: 2020-12-26

## 2020-12-26 RX ORDER — HYDROCODONE BITARTRATE AND ACETAMINOPHEN 10; 325 MG/1; MG/1
1-2 TABLET ORAL EVERY 4 HOURS PRN
Qty: 30 TABLET | Refills: 0 | Status: SHIPPED | OUTPATIENT
Start: 2020-12-26 | End: 2021-01-02

## 2020-12-26 RX ORDER — KETOROLAC TROMETHAMINE 15 MG/ML
15 INJECTION, SOLUTION INTRAMUSCULAR; INTRAVENOUS ONCE
Status: COMPLETED | OUTPATIENT
Start: 2020-12-26 | End: 2020-12-26

## 2020-12-26 NOTE — ED INITIAL ASSESSMENT (HPI)
Pulsating headache since Monday and then started with numbness and pain to left face since xmas patricia
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
## 2020-12-28 ENCOUNTER — TELEPHONE (OUTPATIENT)
Dept: FAMILY MEDICINE CLINIC | Facility: CLINIC | Age: 55
End: 2020-12-28
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
## 2020-12-28 NOTE — TELEPHONE ENCOUNTER
Pt was advised    Future Appointments   Date Time Provider Karly Recinos   12/29/2020  2:40 PM [facility] [address]   1/8/2021 12:30 PM PF MRI RM1 (1.5T) PF MRI [place]

## 2020-12-29 ENCOUNTER — TELEMEDICINE (OUTPATIENT)
Dept: FAMILY MEDICINE CLINIC | Facility: CLINIC | Age: 55
End: 2020-12-29
Payer: MEDICAID

## 2020-12-29 DIAGNOSIS — R51.9 ACUTE NONINTRACTABLE HEADACHE, UNSPECIFIED HEADACHE TYPE: ICD-10-CM

## 2020-12-29 DIAGNOSIS — H92.02 OTALGIA OF LEFT EAR: ICD-10-CM

## 2020-12-29 DIAGNOSIS — B02.9 HERPES ZOSTER WITHOUT COMPLICATION: Primary | ICD-10-CM

## 2020-12-29 PROCEDURE — 99214 OFFICE O/P EST MOD 30 MIN: CPT | Performed by: FAMILY MEDICINE

## 2020-12-29 RX ORDER — GABAPENTIN 300 MG/1
300 CAPSULE ORAL NIGHTLY
Qty: 30 CAPSULE | Refills: 0 | Status: SHIPPED | OUTPATIENT
Start: 2020-12-29 | End: 2021-01-28
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
## 2021-01-08 ENCOUNTER — HOSPITAL ENCOUNTER (OUTPATIENT)
Dept: MRI IMAGING | Age: 56
Discharge: HOME | End: 2021-01-08
Attending: FAMILY MEDICINE
Payer: MEDICAID

## 2021-01-08 DIAGNOSIS — M54.16 LUMBAR RADICULOPATHY: ICD-10-CM

## 2021-01-08 DIAGNOSIS — M62.830 LUMBAR PARASPINAL MUSCLE SPASM: ICD-10-CM

## 2021-01-08 PROCEDURE — 72148 MRI LUMBAR SPINE W/O DYE: CPT | Performed by: FAMILY MEDICINE

## 2021-01-12 ENCOUNTER — TELEPHONE (OUTPATIENT)
Dept: FAMILY MEDICINE CLINIC | Facility: CLINIC | Age: 56
End: 2021-01-12

## 2021-01-12 DIAGNOSIS — M54.16 LUMBAR RADICULOPATHY: Primary | ICD-10-CM
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
## 2021-01-12 DIAGNOSIS — M62.830 LUMBAR PARASPINAL MUSCLE SPASM: ICD-10-CM

## 2021-01-12 NOTE — TELEPHONE ENCOUNTER
----- Message from Sowmya Mondragon DO sent at 1/12/2021  9:29 AM CST -----  Can notify Randa Walters, the good news is she does NOT  Have any significant disc disease, she has some structural  Changes, that are causing some shifting and putting some pressure on
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
## 2021-01-20 ENCOUNTER — OFFICE VISIT (OUTPATIENT)
Dept: PHYSICAL THERAPY | Facility: HOSPITAL | Age: 56
End: 2021-01-20
Attending: FAMILY MEDICINE
Payer: MEDICAID
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
## 2021-01-20 DIAGNOSIS — M54.16 LUMBAR RADICULOPATHY: ICD-10-CM

## 2021-01-20 DIAGNOSIS — M62.830 LUMBAR PARASPINAL MUSCLE SPASM: ICD-10-CM

## 2021-01-20 PROCEDURE — 97110 THERAPEUTIC EXERCISES: CPT

## 2021-01-20 PROCEDURE — 97162 PT EVAL MOD COMPLEX 30 MIN: CPT

## 2021-01-20 PROCEDURE — 97140 MANUAL THERAPY 1/> REGIONS: CPT
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
## 2021-01-22 ENCOUNTER — OFFICE VISIT (OUTPATIENT)
Dept: PHYSICAL THERAPY | Facility: HOSPITAL | Age: 56
End: 2021-01-22
Attending: FAMILY MEDICINE
Payer: MEDICAID

## 2021-01-22 PROCEDURE — 97110 THERAPEUTIC EXERCISES: CPT
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
## 2021-01-27 ENCOUNTER — OFFICE VISIT (OUTPATIENT)
Dept: PHYSICAL THERAPY | Facility: HOSPITAL | Age: 56
End: 2021-01-27
Attending: FAMILY MEDICINE
Payer: MEDICAID

## 2021-01-27 PROCEDURE — 97110 THERAPEUTIC EXERCISES: CPT
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
## 2021-01-29 ENCOUNTER — OFFICE VISIT (OUTPATIENT)
Dept: PHYSICAL THERAPY | Facility: HOSPITAL | Age: 56
End: 2021-01-29
Attending: FAMILY MEDICINE
Payer: MEDICAID

## 2021-01-29 PROCEDURE — 97110 THERAPEUTIC EXERCISES: CPT
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
## 2021-02-01 ENCOUNTER — OFFICE VISIT (OUTPATIENT)
Dept: PHYSICAL THERAPY | Facility: HOSPITAL | Age: 56
End: 2021-02-01
Attending: FAMILY MEDICINE
Payer: MEDICAID

## 2021-02-01 PROCEDURE — 97110 THERAPEUTIC EXERCISES: CPT
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
## 2021-02-04 ENCOUNTER — OFFICE VISIT (OUTPATIENT)
Dept: PHYSICAL THERAPY | Facility: HOSPITAL | Age: 56
End: 2021-02-04
Attending: FAMILY MEDICINE
Payer: MEDICAID

## 2021-02-04 PROCEDURE — 97110 THERAPEUTIC EXERCISES: CPT
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
## 2021-02-08 ENCOUNTER — TELEPHONE (OUTPATIENT)
Dept: PHYSICAL THERAPY | Facility: HOSPITAL | Age: 56
End: 2021-02-08

## 2021-02-08 ENCOUNTER — APPOINTMENT (OUTPATIENT)
Dept: PHYSICAL THERAPY | Facility: HOSPITAL | Age: 56
End: 2021-02-08
Attending: FAMILY MEDICINE
Payer: MEDICAID

## 2021-02-11 ENCOUNTER — OFFICE VISIT (OUTPATIENT)
Dept: PHYSICAL THERAPY | Facility: HOSPITAL | Age: 56
End: 2021-02-11
Attending: FAMILY MEDICINE
Payer: MEDICAID

## 2021-02-11 PROCEDURE — 97140 MANUAL THERAPY 1/> REGIONS: CPT

## 2021-02-11 PROCEDURE — 97110 THERAPEUTIC EXERCISES: CPT

## 2021-02-11 PROCEDURE — 97014 ELECTRIC STIMULATION THERAPY: CPT
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
## 2021-02-15 ENCOUNTER — APPOINTMENT (OUTPATIENT)
Dept: PHYSICAL THERAPY | Facility: HOSPITAL | Age: 56
End: 2021-02-15
Attending: FAMILY MEDICINE
Payer: MEDICAID

## 2021-02-16 ENCOUNTER — OFFICE VISIT (OUTPATIENT)
Dept: PHYSICAL THERAPY | Facility: HOSPITAL | Age: 56
End: 2021-02-16
Attending: FAMILY MEDICINE
Payer: MEDICAID

## 2021-02-16 PROCEDURE — 97140 MANUAL THERAPY 1/> REGIONS: CPT

## 2021-02-16 PROCEDURE — 97110 THERAPEUTIC EXERCISES: CPT
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
## 2021-02-18 ENCOUNTER — APPOINTMENT (OUTPATIENT)
Dept: PHYSICAL THERAPY | Facility: HOSPITAL | Age: 56
End: 2021-02-18
Attending: FAMILY MEDICINE
Payer: MEDICAID

## 2021-02-18 ENCOUNTER — TELEPHONE (OUTPATIENT)
Dept: FAMILY MEDICINE CLINIC | Facility: CLINIC | Age: 56
End: 2021-02-18

## 2021-02-18 DIAGNOSIS — M54.2 NECK PAIN, BILATERAL: ICD-10-CM

## 2021-02-18 DIAGNOSIS — M54.12 CERVICAL RADICULOPATHY DUE TO TRAUMA: ICD-10-CM

## 2021-02-18 DIAGNOSIS — M54.50 ACUTE MIDLINE LOW BACK PAIN WITHOUT SCIATICA: Primary | ICD-10-CM
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
## 2021-02-18 PROCEDURE — 97110 THERAPEUTIC EXERCISES: CPT

## 2021-02-18 PROCEDURE — 97140 MANUAL THERAPY 1/> REGIONS: CPT

## 2021-02-18 RX ORDER — HYDROCODONE BITARTRATE AND ACETAMINOPHEN 10; 325 MG/1; MG/1
1 TABLET ORAL EVERY 6 HOURS PRN
Qty: 50 TABLET | Refills: 0 | Status: SHIPPED | OUTPATIENT
Start: 2021-02-18 | End: 2021-04-10

## 2021-02-18 NOTE — TELEPHONE ENCOUNTER
Pt needs PT referral- would like for it to include therapy for neck and back    LOV: 12/29/20 Telemed  Last Refill:12/7/20 #50 0 RF    Future Appointments   Date Time Provider Karly Recinos   2/25/2021  2:15 PM Kathryn García, PT [facility] PHYS [facility] AT [facility]

## 2021-02-18 NOTE — TELEPHONE ENCOUNTER
Pt called and adv needs refill of      HYDROcodone-acetaminophen  MG Oral Tab    Please send to [facility]    Also pt needs new order for physical therapy that includes neck and back.     Please adv    Thank you
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
## 2021-02-19 RX ORDER — METHOCARBAMOL 500 MG/1
500 TABLET, FILM COATED ORAL 2 TIMES DAILY
Qty: 60 TABLET | Refills: 1 | Status: SHIPPED | OUTPATIENT
Start: 2021-02-19 | End: 2021-05-05

## 2021-02-19 RX ORDER — LORAZEPAM 1 MG/1
TABLET ORAL
Qty: 90 TABLET | Refills: 0 | Status: SHIPPED | OUTPATIENT
Start: 2021-02-19 | End: 2021-03-22

## 2021-02-19 NOTE — TELEPHONE ENCOUNTER
Last OV 12/29/2020 telemed   Last refilled:  11/11/2020 methocarbamol  #60  0 refills  11/4/2020 Lorazepam  #90  0 refills

## 2021-02-20 ENCOUNTER — TELEPHONE (OUTPATIENT)
Dept: FAMILY MEDICINE CLINIC | Facility: CLINIC | Age: 56
End: 2021-02-20

## 2021-02-20 NOTE — TELEPHONE ENCOUNTER
Pharmacy faxed over PA request    Lorazepam 1mg     Not covered by insurance since pt has had Opiod use in the last 30 days     Pt has medicaid replacement- can not do Pa on Covermymeds Call [phone] On Monday  Pt# [phone]

## 2021-02-22 NOTE — TELEPHONE ENCOUNTER
Pt called pharmacy services to start PA  # [phone]    Spoke with Jaquezjase Dennison in pharmacy services    She was provided with fax # to the office- will be faxing over PA form for RN

## 2021-02-23 ENCOUNTER — OFFICE VISIT (OUTPATIENT)
Dept: PHYSICAL THERAPY | Facility: HOSPITAL | Age: 56
End: 2021-02-23
Attending: FAMILY MEDICINE
Payer: MEDICAID

## 2021-02-23 PROCEDURE — 97110 THERAPEUTIC EXERCISES: CPT

## 2021-02-23 PROCEDURE — 97140 MANUAL THERAPY 1/> REGIONS: CPT
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
## 2021-02-25 ENCOUNTER — APPOINTMENT (OUTPATIENT)
Dept: PHYSICAL THERAPY | Facility: HOSPITAL | Age: 56
End: 2021-02-25
Attending: FAMILY MEDICINE
Payer: MEDICAID

## 2021-03-04 RX ORDER — ERGOCALCIFEROL 1.25 MG/1
CAPSULE ORAL
Qty: 12 CAPSULE | Refills: 1 | Status: SHIPPED | OUTPATIENT
Start: 2021-03-04 | End: 2021-09-27

## 2021-03-04 NOTE — TELEPHONE ENCOUNTER
LOV: 9/28/20   Last Refill: 5/27/20  #12 1 RF    No future appointments. No results found for: VITD, QVITD, VITD25, TSJI03EC.

## 2021-03-09 RX ORDER — ESCITALOPRAM OXALATE 20 MG/1
TABLET ORAL
Qty: 90 TABLET | Refills: 1 | Status: SHIPPED | OUTPATIENT
Start: 2021-03-09 | End: 2021-06-28

## 2021-03-09 NOTE — TELEPHONE ENCOUNTER
Routing to provider per protocol. Last refilled on 9/10/20 for # 90 with 1 rf. Last seen on 12/29/20. No future appointments. Thank you.

## 2021-03-12 DIAGNOSIS — Z23 NEED FOR VACCINATION: ICD-10-CM

## 2021-03-22 RX ORDER — LORAZEPAM 1 MG/1
1 TABLET ORAL EVERY 8 HOURS PRN
Qty: 90 TABLET | Refills: 0 | Status: SHIPPED | OUTPATIENT
Start: 2021-03-22 | End: 2021-06-28

## 2021-04-09 ENCOUNTER — TELEPHONE (OUTPATIENT)
Dept: FAMILY MEDICINE CLINIC | Facility: CLINIC | Age: 56
End: 2021-04-09

## 2021-04-09 DIAGNOSIS — M54.12 CERVICAL RADICULOPATHY DUE TO TRAUMA: ICD-10-CM
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
## 2021-04-09 DIAGNOSIS — M54.50 ACUTE MIDLINE LOW BACK PAIN WITHOUT SCIATICA: ICD-10-CM

## 2021-04-09 DIAGNOSIS — M54.2 NECK PAIN, BILATERAL: ICD-10-CM

## 2021-04-09 RX ORDER — TRAZODONE HYDROCHLORIDE 50 MG/1
TABLET ORAL
Qty: 60 TABLET | Refills: 3 | Status: SHIPPED | OUTPATIENT
Start: 2021-04-09 | End: 2021-05-05
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
## 2021-04-10 RX ORDER — HYDROCODONE BITARTRATE AND ACETAMINOPHEN 10; 325 MG/1; MG/1
1 TABLET ORAL EVERY 6 HOURS PRN
Qty: 50 TABLET | Refills: 0 | Status: SHIPPED | OUTPATIENT
Start: 2021-04-10 | End: 2021-08-10

## 2021-04-12 ENCOUNTER — TELEPHONE (OUTPATIENT)
Dept: FAMILY MEDICINE CLINIC | Facility: CLINIC | Age: 56
End: 2021-04-12

## 2021-04-12 NOTE — TELEPHONE ENCOUNTER
Called [facility] to initiate [place]. [phone], ID# [id number]. Spoke with Kathy, she is faxing forms.

## 2021-04-13 ENCOUNTER — TELEPHONE (OUTPATIENT)
Dept: FAMILY MEDICINE CLINIC | Facility: CLINIC | Age: 56
End: 2021-04-13

## 2021-04-13 DIAGNOSIS — N63.10 LUMP OF BREAST, RIGHT: Primary | ICD-10-CM

## 2021-04-13 NOTE — TELEPHONE ENCOUNTER
PT CALLED AND ADV HAD J&J VACCINE ON 4/8/21. PT ADV R LEG IS BIGGER THAN LEFT  PT FOUND ANOTHER LUMP IN R BREAST. PT TAKES DAILY ASPIRIN. PT WOULD LIKE TO KNOW SHOULD SHE BE CONCERNED.       PLEASE ADV    THANK YOU

## 2021-04-13 NOTE — TELEPHONE ENCOUNTER
Pt states she had J & J on 4/8/21    Yesterday she noticed R Leg is larger than the Left. Pt states it feels swollen and it isnt decreasing.     Pt denies it to be red or swollen to to the touch    Pt states she take a baby ASA daily- should she be concern

## 2021-04-13 NOTE — TELEPHONE ENCOUNTER
Pt was advised of information below- verbalized understanding    Pt wanted to know if we have to get breast US pre authed- RN states she will talk to Referrals department and find out if we need to do an [place].     Message sent to referrals department
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
## 2021-04-14 NOTE — TELEPHONE ENCOUNTER
Medication was denied through insurance- pt was advised that she will need to pay out of pocket    Pt verbalized understanding

## 2021-05-05 ENCOUNTER — OFFICE VISIT (OUTPATIENT)
Dept: FAMILY MEDICINE CLINIC | Facility: CLINIC | Age: 56
End: 2021-05-05
Payer: MEDICAID

## 2021-05-05 VITALS
WEIGHT: 239.38 LBS | DIASTOLIC BLOOD PRESSURE: 66 MMHG | HEART RATE: 72 BPM | TEMPERATURE: 98 F | SYSTOLIC BLOOD PRESSURE: 102 MMHG | RESPIRATION RATE: 16 BRPM | BODY MASS INDEX: 38 KG/M2

## 2021-05-05 DIAGNOSIS — T50.905A MEDICATION SIDE EFFECT, INITIAL ENCOUNTER: ICD-10-CM

## 2021-05-05 DIAGNOSIS — S72.301A CLOSED FRACTURE OF SHAFT OF RIGHT FEMUR, UNSPECIFIED FRACTURE MORPHOLOGY, INITIAL ENCOUNTER (HCC): ICD-10-CM

## 2021-05-05 DIAGNOSIS — R11.2 NON-INTRACTABLE VOMITING WITH NAUSEA, UNSPECIFIED VOMITING TYPE: ICD-10-CM

## 2021-05-05 DIAGNOSIS — M25.561 ACUTE PAIN OF RIGHT KNEE: ICD-10-CM

## 2021-05-05 DIAGNOSIS — R42 VERTIGO: Primary | ICD-10-CM

## 2021-05-05 PROCEDURE — 3074F SYST BP LT 130 MM HG: CPT | Performed by: FAMILY MEDICINE

## 2021-05-05 PROCEDURE — 99214 OFFICE O/P EST MOD 30 MIN: CPT | Performed by: FAMILY MEDICINE

## 2021-05-05 PROCEDURE — 3078F DIAST BP <80 MM HG: CPT | Performed by: FAMILY MEDICINE

## 2021-05-05 RX ORDER — METHOCARBAMOL 500 MG/1
500 TABLET, FILM COATED ORAL 2 TIMES DAILY
Qty: 60 TABLET | Refills: 1 | Status: SHIPPED | OUTPATIENT
Start: 2021-05-05 | End: 2021-08-10

## 2021-05-05 RX ORDER — ASPIRIN 325 MG
325 TABLET ORAL DAILY
COMMUNITY
End: 2021-08-10 | Stop reason: ALTCHOICE

## 2021-05-05 NOTE — PROGRESS NOTES
Jessica Bailey is a 54year old female. HPI:   Christina Marie has been dizzy? She notes that when she closes her eyes, or looks rapidly form side to side she gets nauseated and will vomit.  She denies, double vision,  She denies any URI sx, she denies any Years: 3.00        Pack years: .3        Quit date: 1993        Years since quittin.0      Smokeless tobacco: Never Used    Vaping Use      Vaping Use: Never used    Alcohol use:  Yes      Alcohol/week: 0.0 - 2.0 standard drinks      Comment: occ Visit:  Requested Prescriptions     Signed Prescriptions Disp Refills   • methocarbamol 500 MG Oral Tab 60 tablet 1     Sig: Take 1 tablet (500 mg total) by mouth 2 (two) times daily.        Imaging & Consults:  None     The patient indicates understanding

## 2021-05-21 ENCOUNTER — HOSPITAL ENCOUNTER (OUTPATIENT)
Dept: ULTRASOUND IMAGING | Age: 56
Discharge: HOME | End: 2021-05-21
Attending: FAMILY MEDICINE
Payer: MEDICAID

## 2021-05-21 PROCEDURE — 76642 ULTRASOUND BREAST LIMITED: CPT | Performed by: FAMILY MEDICINE

## 2021-05-21 NOTE — IMAGING NOTE
This Breast Care RN assisted Dr. Christi Fox with recommendation for a right breast 1 site ultrasound guided biopsy for mass. Procedure reviewed and all questions answered. Emotional and educational support given.    On the day of the biopsy, pt instructed t

## 2021-05-28 ENCOUNTER — HOSPITAL ENCOUNTER (OUTPATIENT)
Dept: MAMMOGRAPHY | Facility: HOSPITAL | Age: 56
Discharge: HOME | End: 2021-05-28
Attending: FAMILY MEDICINE
Payer: MEDICAID

## 2021-05-28 DIAGNOSIS — R92.8 ABNORMAL MAMMOGRAM OF RIGHT BREAST: ICD-10-CM

## 2021-05-28 DIAGNOSIS — N63.0 BREAST MASS: ICD-10-CM

## 2021-05-28 PROCEDURE — 19000 PUNCTURE ASPIR CYST BREAST: CPT | Performed by: FAMILY MEDICINE

## 2021-05-28 PROCEDURE — 76942 ECHO GUIDE FOR BIOPSY: CPT | Performed by: FAMILY MEDICINE

## 2021-06-23 ENCOUNTER — TELEPHONE (OUTPATIENT)
Dept: FAMILY MEDICINE CLINIC | Facility: CLINIC | Age: 56
End: 2021-06-23

## 2021-06-28 RX ORDER — LORAZEPAM 1 MG/1
TABLET ORAL
Qty: 90 TABLET | Refills: 0 | Status: SHIPPED | OUTPATIENT
Start: 2021-06-28 | End: 2021-08-23

## 2021-06-28 RX ORDER — TRAZODONE HYDROCHLORIDE 50 MG/1
TABLET ORAL
Qty: 90 TABLET | Refills: 2 | Status: SHIPPED | OUTPATIENT
Start: 2021-06-28 | End: 2021-08-10

## 2021-06-28 RX ORDER — ESCITALOPRAM OXALATE 20 MG/1
TABLET ORAL
Qty: 90 TABLET | Refills: 1 | Status: SHIPPED | OUTPATIENT
Start: 2021-06-28 | End: 2021-08-10

## 2021-06-30 ENCOUNTER — TELEPHONE (OUTPATIENT)
Dept: FAMILY MEDICINE CLINIC | Facility: CLINIC | Age: 56
End: 2021-06-30

## 2021-06-30 NOTE — TELEPHONE ENCOUNTER
PA started today for Lorazepam    [phone]

## 2021-07-13 ENCOUNTER — TELEPHONE (OUTPATIENT)
Dept: FAMILY MEDICINE CLINIC | Facility: CLINIC | Age: 56
End: 2021-07-13

## 2021-07-13 ENCOUNTER — PATIENT MESSAGE (OUTPATIENT)
Dept: FAMILY MEDICINE CLINIC | Facility: CLINIC | Age: 56
End: 2021-07-13

## 2021-07-13 NOTE — TELEPHONE ENCOUNTER
Pt called she has bruising around her ankle. She doesn't remember twisting it or anything. She thought it was going to get better but it has not . She is wanting to know if the dr thinks she should go to urgent care or what she should do?

## 2021-07-13 NOTE — TELEPHONE ENCOUNTER
From: Rubio Kauffman  To: Elenita Gramajo DO  Sent: 7/13/2021 4:05 PM CDT  Subject: Other    I don't know why I have this bad bruise   I am bruise more than normal but this is getting worse

## 2021-07-13 NOTE — TELEPHONE ENCOUNTER
Pt states her L ankel is brusied or has blood pooling. She states she has had no trauma to the area- it is not painful but it is bruising.     She is going to send a photo via [facility]

## 2021-08-10 ENCOUNTER — OFFICE VISIT (OUTPATIENT)
Dept: FAMILY MEDICINE CLINIC | Facility: CLINIC | Age: 56
End: 2021-08-10
Payer: MEDICAID

## 2021-08-10 VITALS
RESPIRATION RATE: 16 BRPM | TEMPERATURE: 98 F | DIASTOLIC BLOOD PRESSURE: 86 MMHG | WEIGHT: 244.63 LBS | BODY MASS INDEX: 38 KG/M2 | SYSTOLIC BLOOD PRESSURE: 110 MMHG | HEART RATE: 64 BPM

## 2021-08-10 DIAGNOSIS — M79.604 LOW BACK PAIN RADIATING TO RIGHT LOWER EXTREMITY: ICD-10-CM

## 2021-08-10 DIAGNOSIS — R10.11 RIGHT UPPER QUADRANT ABDOMINAL PAIN: ICD-10-CM

## 2021-08-10 DIAGNOSIS — K44.9 HIATAL HERNIA: ICD-10-CM

## 2021-08-10 DIAGNOSIS — M25.511 CHRONIC RIGHT SHOULDER PAIN: ICD-10-CM

## 2021-08-10 DIAGNOSIS — M54.50 LOW BACK PAIN RADIATING TO RIGHT LOWER EXTREMITY: ICD-10-CM

## 2021-08-10 DIAGNOSIS — G89.29 CHRONIC RIGHT SHOULDER PAIN: ICD-10-CM

## 2021-08-10 DIAGNOSIS — K21.00 GASTROESOPHAGEAL REFLUX DISEASE WITH ESOPHAGITIS WITHOUT HEMORRHAGE: ICD-10-CM

## 2021-08-10 DIAGNOSIS — D12.6 ADENOMATOUS POLYP OF COLON, UNSPECIFIED PART OF COLON: Primary | ICD-10-CM

## 2021-08-10 LAB
ALBUMIN SERPL-MCNC: 4 G/DL (ref 3.4–5)
ALBUMIN/GLOB SERPL: 0.9 {RATIO} (ref 1–2)
ALP LIVER SERPL-CCNC: 110 U/L
ALT SERPL-CCNC: 18 U/L
AMYLASE SERPL-CCNC: 67 U/L (ref 25–115)
ANION GAP SERPL CALC-SCNC: 3 MMOL/L (ref 0–18)
AST SERPL-CCNC: 17 U/L (ref 15–37)
BILIRUB SERPL-MCNC: 0.5 MG/DL (ref 0.1–2)
BUN BLD-MCNC: 17 MG/DL (ref 7–18)
CALCIUM BLD-MCNC: 9.4 MG/DL (ref 8.5–10.1)
CHLORIDE SERPL-SCNC: 108 MMOL/L (ref 98–112)
CO2 SERPL-SCNC: 25 MMOL/L (ref 21–32)
CREAT BLD-MCNC: 0.75 MG/DL
GLOBULIN PLAS-MCNC: 4.4 G/DL (ref 2.8–4.4)
GLUCOSE BLD-MCNC: 82 MG/DL (ref 70–99)
LIPASE SERPL-CCNC: 163 U/L (ref 73–393)
M PROTEIN MFR SERPL ELPH: 8.4 G/DL (ref 6.4–8.2)
OSMOLALITY SERPL CALC.SUM OF ELEC: 283 MOSM/KG (ref 275–295)
PATIENT FASTING Y/N/NP: NO
POTASSIUM SERPL-SCNC: 4.1 MMOL/L (ref 3.5–5.1)
SODIUM SERPL-SCNC: 136 MMOL/L (ref 136–145)

## 2021-08-10 PROCEDURE — 99215 OFFICE O/P EST HI 40 MIN: CPT | Performed by: FAMILY MEDICINE

## 2021-08-10 PROCEDURE — 82150 ASSAY OF AMYLASE: CPT | Performed by: FAMILY MEDICINE

## 2021-08-10 PROCEDURE — 80053 COMPREHEN METABOLIC PANEL: CPT | Performed by: FAMILY MEDICINE

## 2021-08-10 PROCEDURE — 3079F DIAST BP 80-89 MM HG: CPT | Performed by: FAMILY MEDICINE

## 2021-08-10 PROCEDURE — 83690 ASSAY OF LIPASE: CPT | Performed by: FAMILY MEDICINE

## 2021-08-10 PROCEDURE — 3074F SYST BP LT 130 MM HG: CPT | Performed by: FAMILY MEDICINE

## 2021-08-10 RX ORDER — ESCITALOPRAM OXALATE 20 MG/1
40 TABLET ORAL DAILY
Qty: 90 TABLET | Refills: 1 | COMMUNITY
Start: 2021-08-10 | End: 2021-08-10

## 2021-08-10 RX ORDER — ASPIRIN 81 MG/1
81 TABLET ORAL DAILY
COMMUNITY

## 2021-08-10 RX ORDER — ESCITALOPRAM OXALATE 20 MG/1
20 TABLET ORAL DAILY
Qty: 90 TABLET | Refills: 1 | COMMUNITY
Start: 2021-08-10 | End: 2021-12-08

## 2021-08-10 NOTE — PROGRESS NOTES
Dashawn Ann is a 54year old female. HPI:   Mehran Hurstdley is here for discussion of her anxiety and sleep issues, she was taking trazodone but had to stop due to going up to 100 mg a day but had SE, and now only sleeps 3 hours a day.  She is still ha use: No       REVIEW OF SYSTEMS:   GENERAL HEALTH: feels exhausted, not sleeping  HEENT: has a sore throat and sinus congestion off and on  SKIN: denies any unusual skin lesions or rashes  RESPIRATORY: denies shortness of breath with exertion  CARDIOVASCUL THE ESCITALOPRAM TO 30 MG AND SEE HOW SHE DOES if her sx persist we may consider switching her to something else, can take a lorazepam in the AM if she can tolerate it, she is due for a follow up colonoscopy and would mention her reflux issues to Dr. Jessy Dupree

## 2021-08-11 ENCOUNTER — TELEPHONE (OUTPATIENT)
Dept: FAMILY MEDICINE CLINIC | Facility: CLINIC | Age: 56
End: 2021-08-11

## 2021-08-11 NOTE — TELEPHONE ENCOUNTER
----- Message from Lamar Leventhal, DO sent at 8/11/2021 12:32 PM CDT -----  Can notify Hussein Locorosalino her labs actually looked good, her Pancreas was fine, her liver looked good, no signs of diabetes, one of her tests was elevated, but it is related to her femur f

## 2021-08-23 RX ORDER — TEMAZEPAM 7.5 MG/1
7.5 CAPSULE ORAL NIGHTLY PRN
Qty: 30 CAPSULE | Refills: 0 | Status: SHIPPED | OUTPATIENT
Start: 2021-08-23 | End: 2021-09-22 | Stop reason: WASHOUT

## 2021-08-23 RX ORDER — TRAZODONE HYDROCHLORIDE 50 MG/1
TABLET ORAL
Qty: 60 TABLET | Refills: 3 | OUTPATIENT
Start: 2021-08-23

## 2021-08-23 NOTE — TELEPHONE ENCOUNTER
Sent in RX for Temazepam 7.5 mg for a week and if that doesn;t work then we can double it up but call first

## 2021-08-23 NOTE — TELEPHONE ENCOUNTER
Trazodone Refill  LOV: 8/10/21   Last Refill: 6/28/21 #90 2 RF    On 8/10/21  Pt reports not taking    Pt states she does not want to take Trazodone- she states she spoke with Dr. Sweetie Angel about taking something different for sleep?       Lorazepam does not he

## 2021-09-27 RX ORDER — ERGOCALCIFEROL 1.25 MG/1
CAPSULE ORAL
Qty: 12 CAPSULE | Refills: 1 | Status: SHIPPED | OUTPATIENT
Start: 2021-09-27 | End: 2021-12-02

## 2021-09-27 RX ORDER — ERGOCALCIFEROL 1.25 MG/1
CAPSULE ORAL
Qty: 12 CAPSULE | Refills: 1 | Status: SHIPPED | OUTPATIENT
Start: 2021-09-27

## 2021-12-02 ENCOUNTER — TELEPHONE (OUTPATIENT)
Dept: FAMILY MEDICINE CLINIC | Facility: CLINIC | Age: 56
End: 2021-12-02

## 2021-12-02 ENCOUNTER — OFFICE VISIT (OUTPATIENT)
Dept: FAMILY MEDICINE CLINIC | Facility: CLINIC | Age: 56
End: 2021-12-02
Payer: MEDICAID

## 2021-12-02 VITALS
TEMPERATURE: 98 F | RESPIRATION RATE: 16 BRPM | SYSTOLIC BLOOD PRESSURE: 104 MMHG | HEIGHT: 68.5 IN | WEIGHT: 254.81 LBS | DIASTOLIC BLOOD PRESSURE: 84 MMHG | HEART RATE: 80 BPM | BODY MASS INDEX: 38.18 KG/M2

## 2021-12-02 DIAGNOSIS — Z12.11 ENCOUNTER FOR SCREENING COLONOSCOPY: ICD-10-CM

## 2021-12-02 DIAGNOSIS — K21.00 GASTROESOPHAGEAL REFLUX DISEASE WITH ESOPHAGITIS WITHOUT HEMORRHAGE: Primary | ICD-10-CM

## 2021-12-02 DIAGNOSIS — Z11.3 SCREEN FOR STD (SEXUALLY TRANSMITTED DISEASE): ICD-10-CM

## 2021-12-02 DIAGNOSIS — E28.2 PCOS (POLYCYSTIC OVARIAN SYNDROME): ICD-10-CM

## 2021-12-02 DIAGNOSIS — E01.0 THYROMEGALY: ICD-10-CM

## 2021-12-02 DIAGNOSIS — E66.09 CLASS 2 OBESITY DUE TO EXCESS CALORIES WITH BODY MASS INDEX (BMI) OF 38.0 TO 38.9 IN ADULT, UNSPECIFIED WHETHER SERIOUS COMORBIDITY PRESENT: ICD-10-CM

## 2021-12-02 DIAGNOSIS — K44.9 HIATAL HERNIA: ICD-10-CM

## 2021-12-02 DIAGNOSIS — Z00.00 ROUTINE HEALTH MAINTENANCE: ICD-10-CM

## 2021-12-02 DIAGNOSIS — R10.11 RIGHT UPPER QUADRANT ABDOMINAL PAIN: ICD-10-CM

## 2021-12-02 PROCEDURE — 3079F DIAST BP 80-89 MM HG: CPT | Performed by: FAMILY MEDICINE

## 2021-12-02 PROCEDURE — 3074F SYST BP LT 130 MM HG: CPT | Performed by: FAMILY MEDICINE

## 2021-12-02 PROCEDURE — 99396 PREV VISIT EST AGE 40-64: CPT | Performed by: FAMILY MEDICINE

## 2021-12-02 PROCEDURE — 3008F BODY MASS INDEX DOCD: CPT | Performed by: FAMILY MEDICINE

## 2021-12-02 RX ORDER — OMEPRAZOLE 40 MG/1
40 CAPSULE, DELAYED RELEASE ORAL DAILY
Qty: 90 CAPSULE | Refills: 3 | Status: SHIPPED | OUTPATIENT
Start: 2021-12-02 | End: 2022-11-27

## 2021-12-02 RX ORDER — LORAZEPAM 1 MG/1
TABLET ORAL AS NEEDED
COMMUNITY
Start: 2021-08-26

## 2021-12-02 NOTE — PROGRESS NOTES
HPI:   Sky Costello is a 54year old female who presents for a complete physical exam. Symptoms: denies discharge, itching, burning or dysuria. Patient complains of having gained a considerable amount of weight, she has some physical restrcitions. 74 (H) 06/10/2019    HDL 81 05/15/2018     Lab Results   Component Value Date    LDL 89 09/28/2020    LDL 74 06/10/2019     05/15/2018     Lab Results   Component Value Date    AST 17 08/10/2021    AST 15 12/26/2020    AST 18 09/28/2020     Lab Resu 0.0 - 2.0 standard drinks      Comment: social    Drug use: No    Occ: . : . Children: 2. Exercise: none.   Diet: doesn't watch     REVIEW OF SYSTEMS:   GENERAL: feels well otherwise  SKIN: denies any unusual skin lesions  EYES:d given for: exercise, low fat diet and breast self-exam. Pt' s weight is Body mass index is 38.18 kg/m². , recommended low fat diet and aerobic exercise 30 minutes three times weekly.   The patient indicates understanding of these issues and agrees to the keren

## 2021-12-02 NOTE — TELEPHONE ENCOUNTER
Pt got a GI doc referral from [facility], however they do not take her insurance. Is there another GI doc [facility] can refer her to?   Thank You

## 2021-12-03 RX ORDER — TEMAZEPAM 15 MG/1
15 CAPSULE ORAL NIGHTLY PRN
Qty: 15 CAPSULE | Refills: 0 | Status: SHIPPED | OUTPATIENT
Start: 2021-12-03 | End: 2021-12-18

## 2021-12-03 NOTE — TELEPHONE ENCOUNTER
So let her know, remeron is not much different than other things she's been on in the past, I can send in something for sleep, but I need to think about the anxiety thing a bit, send me the note back as a reminder

## 2021-12-03 NOTE — TELEPHONE ENCOUNTER
recevied confimation that the [facility] doesn't take the [address] [place] [address]  Will need to give her an alternative GI    She can call [facility] @ [phone]    New referral entered

## 2021-12-03 NOTE — TELEPHONE ENCOUNTER
Spoke with the pt and she states that yesterday at [address] she discussed with Dr. Alina Bates something to help with sleep but she states that there was nothing at the pharmcy.       She also states that she told Dr. Ailna Bates she would call back with the name of the [facility]

## 2021-12-04 ENCOUNTER — TELEPHONE (OUTPATIENT)
Dept: FAMILY MEDICINE CLINIC | Facility: CLINIC | Age: 56
End: 2021-12-04

## 2021-12-04 NOTE — TELEPHONE ENCOUNTER
----- Message from Agnieszka Saenz DO sent at 12/3/2021  8:53 PM CST -----  Notify Sienna  her labs looked very good, BS kidney, liver thyroid and blood count.  Let’s keep the appt with weight loss

## 2021-12-04 NOTE — TELEPHONE ENCOUNTER
Spoke with the pt and advised of the notes from DS- the pt v/u states that she would prefer to have a single pill that might help with sleep and anxiety.     She asked about lorazepam- advised that Dr. Rebollar Fearing sent in temazepam and it is in the same class as

## 2021-12-08 RX ORDER — VENLAFAXINE HYDROCHLORIDE 37.5 MG/1
37.5 TABLET, EXTENDED RELEASE ORAL DAILY
Qty: 30 TABLET | Refills: 1 | Status: SHIPPED | OUTPATIENT
Start: 2021-12-08 | End: 2021-12-09

## 2021-12-08 NOTE — TELEPHONE ENCOUNTER
Most of what I have prescribed for her in the past as supposed to have the same effect, but it appears that has not been the case.  So lets try the Temazepam , and also I will send in an RX for venlafaxine 37.5 mg daily and then have her follow up with me i

## 2021-12-09 RX ORDER — VENLAFAXINE HYDROCHLORIDE 37.5 MG/1
37.5 CAPSULE, EXTENDED RELEASE ORAL DAILY
Qty: 30 CAPSULE | Refills: 2 | Status: SHIPPED | OUTPATIENT
Start: 2021-12-09 | End: 2022-01-08

## 2021-12-17 ENCOUNTER — TELEPHONE (OUTPATIENT)
Dept: FAMILY MEDICINE CLINIC | Facility: CLINIC | Age: 56
End: 2021-12-17

## 2021-12-17 NOTE — TELEPHONE ENCOUNTER
Pt called [facility] pharmacy called her and prescription was ready but pt not sure why she has that prescription would like a call back     venlafaxine 37.5 MG Oral Capsule SR 24 Hr    Pt call back # [phone]    Thank you

## 2021-12-17 NOTE — TELEPHONE ENCOUNTER
Patient notified of DS comments and recs from 12/2/21 tel enc and verbalized understanding. States she needs the temazepam refilled, as she only has one tablet left.     Patient states she is already taking escitalopram, does not want to add another depr

## 2021-12-18 RX ORDER — TEMAZEPAM 15 MG/1
15 CAPSULE ORAL NIGHTLY PRN
Qty: 30 CAPSULE | Refills: 0 | Status: SHIPPED | OUTPATIENT
Start: 2021-12-18 | End: 2022-01-31

## 2021-12-18 NOTE — TELEPHONE ENCOUNTER
Pt was advised of information below- she will try the venlafaxine and see if it helps    Pt was advised that Temazepam was sent to pharmacy

## 2021-12-18 NOTE — TELEPHONE ENCOUNTER
I am not adding the velafaxine on, she would switch over since it seems the escitalopram is not working

## 2022-01-31 RX ORDER — TEMAZEPAM 15 MG/1
15 CAPSULE ORAL NIGHTLY PRN
Qty: 30 CAPSULE | Refills: 2 | Status: SHIPPED | OUTPATIENT
Start: 2022-01-31 | End: 2022-06-28

## 2022-02-18 RX ORDER — ESCITALOPRAM OXALATE 20 MG/1
20 TABLET ORAL DAILY
COMMUNITY
End: 2022-04-04

## 2022-02-22 ENCOUNTER — LAB ENCOUNTER (OUTPATIENT)
Dept: LAB | Facility: HOSPITAL | Age: 57
End: 2022-02-22
Attending: INTERNAL MEDICINE
Payer: MEDICAID

## 2022-02-22 DIAGNOSIS — Z01.818 PRE-OP TESTING: ICD-10-CM

## 2022-02-22 LAB — SARS-COV-2 RNA RESP QL NAA+PROBE: NOT DETECTED

## 2022-02-22 NOTE — PROGRESS NOTES
You will be happy to know that your COVID 19 test returned NEGATIVE. If you need any further assistance, please feel free to call [phone]. Thank you for letting us care for you.     Best regards,   MD Ara Pachecofrancesca  Gastroenterology  [phone]

## 2022-02-24 ENCOUNTER — ANESTHESIA EVENT (OUTPATIENT)
Dept: ENDOSCOPY | Facility: HOSPITAL | Age: 57
End: 2022-02-24
Payer: MEDICAID

## 2022-02-24 ENCOUNTER — ANESTHESIA (OUTPATIENT)
Dept: ENDOSCOPY | Facility: HOSPITAL | Age: 57
End: 2022-02-24
Payer: MEDICAID

## 2022-02-24 ENCOUNTER — HOSPITAL ENCOUNTER (OUTPATIENT)
Facility: HOSPITAL | Age: 57
Setting detail: HOSPITAL OUTPATIENT SURGERY
Discharge: HOME | End: 2022-02-24
Attending: INTERNAL MEDICINE | Admitting: INTERNAL MEDICINE
Payer: MEDICAID

## 2022-02-24 VITALS
RESPIRATION RATE: 11 BRPM | BODY MASS INDEX: 39.4 KG/M2 | DIASTOLIC BLOOD PRESSURE: 90 MMHG | TEMPERATURE: 97 F | OXYGEN SATURATION: 95 % | WEIGHT: 260 LBS | HEIGHT: 68 IN | HEART RATE: 71 BPM | SYSTOLIC BLOOD PRESSURE: 130 MMHG

## 2022-02-24 DIAGNOSIS — Z86.010 PERSONAL HISTORY OF COLONIC POLYPS: ICD-10-CM

## 2022-02-24 DIAGNOSIS — R13.19 ESOPHAGEAL DYSPHAGIA: ICD-10-CM

## 2022-02-24 DIAGNOSIS — Z01.818 PRE-OP TESTING: Primary | ICD-10-CM

## 2022-02-24 DIAGNOSIS — K21.00 GASTROESOPHAGEAL REFLUX DISEASE WITH ESOPHAGITIS WITHOUT HEMORRHAGE: ICD-10-CM

## 2022-02-24 DIAGNOSIS — K21.9 GASTROESOPHAGEAL REFLUX DISEASE, UNSPECIFIED WHETHER ESOPHAGITIS PRESENT: ICD-10-CM

## 2022-02-24 PROCEDURE — 0DB68ZX EXCISION OF STOMACH, VIA NATURAL OR ARTIFICIAL OPENING ENDOSCOPIC, DIAGNOSTIC: ICD-10-PCS | Performed by: INTERNAL MEDICINE

## 2022-02-24 PROCEDURE — 0DBK8ZX EXCISION OF ASCENDING COLON, VIA NATURAL OR ARTIFICIAL OPENING ENDOSCOPIC, DIAGNOSTIC: ICD-10-PCS | Performed by: INTERNAL MEDICINE

## 2022-02-24 PROCEDURE — 88312 SPECIAL STAINS GROUP 1: CPT | Performed by: INTERNAL MEDICINE

## 2022-02-24 PROCEDURE — 36415 COLL VENOUS BLD VENIPUNCTURE: CPT | Performed by: INTERNAL MEDICINE

## 2022-02-24 PROCEDURE — 0DB58ZX EXCISION OF ESOPHAGUS, VIA NATURAL OR ARTIFICIAL OPENING ENDOSCOPIC, DIAGNOSTIC: ICD-10-PCS | Performed by: INTERNAL MEDICINE

## 2022-02-24 PROCEDURE — 88305 TISSUE EXAM BY PATHOLOGIST: CPT | Performed by: INTERNAL MEDICINE

## 2022-02-24 RX ORDER — SODIUM CHLORIDE, SODIUM LACTATE, POTASSIUM CHLORIDE, CALCIUM CHLORIDE 600; 310; 30; 20 MG/100ML; MG/100ML; MG/100ML; MG/100ML
INJECTION, SOLUTION INTRAVENOUS CONTINUOUS PRN
Status: DISCONTINUED | OUTPATIENT
Start: 2022-02-24 | End: 2022-02-24 | Stop reason: SURG

## 2022-02-24 RX ORDER — OMEPRAZOLE 40 MG/1
40 CAPSULE, DELAYED RELEASE ORAL
Qty: 60 CAPSULE | Refills: 5 | Status: SHIPPED | OUTPATIENT
Start: 2022-02-24 | End: 2022-03-07

## 2022-02-24 RX ORDER — LIDOCAINE HYDROCHLORIDE 10 MG/ML
INJECTION, SOLUTION EPIDURAL; INFILTRATION; INTRACAUDAL; PERINEURAL AS NEEDED
Status: DISCONTINUED | OUTPATIENT
Start: 2022-02-24 | End: 2022-02-24 | Stop reason: SURG

## 2022-02-24 RX ORDER — SODIUM CHLORIDE, SODIUM LACTATE, POTASSIUM CHLORIDE, CALCIUM CHLORIDE 600; 310; 30; 20 MG/100ML; MG/100ML; MG/100ML; MG/100ML
INJECTION, SOLUTION INTRAVENOUS CONTINUOUS
Status: DISCONTINUED | OUTPATIENT
Start: 2022-02-24 | End: 2022-02-24

## 2022-02-24 RX ORDER — NALOXONE HYDROCHLORIDE 0.4 MG/ML
80 INJECTION, SOLUTION INTRAMUSCULAR; INTRAVENOUS; SUBCUTANEOUS AS NEEDED
Status: DISCONTINUED | OUTPATIENT
Start: 2022-02-24 | End: 2022-02-24

## 2022-02-24 RX ADMIN — LIDOCAINE HYDROCHLORIDE 50 MG: 10 INJECTION, SOLUTION EPIDURAL; INFILTRATION; INTRACAUDAL; PERINEURAL at 14:50:00

## 2022-02-24 RX ADMIN — SODIUM CHLORIDE, SODIUM LACTATE, POTASSIUM CHLORIDE, CALCIUM CHLORIDE: 600; 310; 30; 20 INJECTION, SOLUTION INTRAVENOUS at 14:49:00

## 2022-02-24 RX ADMIN — SODIUM CHLORIDE, SODIUM LACTATE, POTASSIUM CHLORIDE, CALCIUM CHLORIDE: 600; 310; 30; 20 INJECTION, SOLUTION INTRAVENOUS at 15:17:00

## 2022-02-24 NOTE — PRE-SEDATION ASSESSMENT
Physician Pre-Sedation Assessment    Pre-Sedation Assessment:    Sedation History: Previous Sedation with No Complications and Airway Assessed    Cardiac: normal S1, S2  Respiratory: breath sounds clear bilaterally   Abdomen: soft, BS (+), non-tender    ASA Classification: 2.  Patient with mild systemic disease    Plan: IV Sedation

## 2022-03-07 NOTE — PROGRESS NOTES
3/7/2022  Divya Vasquez [id number]    Dear Heri Gilbert,     I called you and left you a message. Here are the biopsy/pathology findings from your recent EGD (upper endoscopy) and colonoscopy: The biopsy/pathology findings from your upper endoscopy showed:    reflux esophagitis - an inflammation of the esophagus due to GERD and a positive test for a bacteria called H-Pylori. It is treated as follows: amoxicillin 1 gram twice daily for 14 days, biaxin 500 mg twice daily for 14 days, omeprazole 20 mg twice a day for 14 days. The  biopsy/pathology findings from your colonoscopy showed:    an adenomatous polyp(s), which is a benign potentially pre-cancerous growth that was removed. These types of polyps can re-occur. Follow-up information:    Your next colonoscopy is due in 3 Years(s) for a personal history of colon polyps. Please get a stool test done in 6 weeks to make sure the hpylori infection is gone. If you need any further assistance, please feel free to call [phone]. Thank you for letting us care for you .     Sincerely ,     MD Lorenzo Thakur 2 Gastroenterology  [phone]

## 2022-04-04 RX ORDER — ESCITALOPRAM OXALATE 20 MG/1
20 TABLET ORAL DAILY
Qty: 90 TABLET | Refills: 1 | Status: SHIPPED | OUTPATIENT
Start: 2022-04-04 | End: 2022-06-28

## 2022-04-04 NOTE — TELEPHONE ENCOUNTER
Pt states that she is on the 20mg Escitalopram    Pt also states that her brother  on Thursday and she is dealing with a lot right now- once things settle down she will come in to see you for a med check    Pt states she did not like the venlafaxine- it gave her headaches- wants to stay on this medication

## 2022-04-04 NOTE — TELEPHONE ENCOUNTER
Routing to provider per protocol. Last refilled on 6/8/21 for # 90 with 1 rf. See Office visit note 8/10/21--  LETS INCREASE THE ESCITALOPRAM TO 30 MG AND SEE HOW SHE DOES if her sx persist we may consider switching her to something else  Last seen on 12/2/21. Future Appointments   Date Time Provider Karly Recinos   7/1/2022  9:00 AM Jazmín Fonseca MD [address] [place] [address]        Thank you.

## 2022-04-04 NOTE — TELEPHONE ENCOUNTER
Can we verify how much she Is taking? She has 20, 30 and someone ordred 40 mg last month? Dr. Delbert Chan?

## 2022-05-06 ENCOUNTER — TELEPHONE (OUTPATIENT)
Dept: FAMILY MEDICINE CLINIC | Facility: CLINIC | Age: 57
End: 2022-05-06

## 2022-05-06 NOTE — TELEPHONE ENCOUNTER
Patient states she contracted herpes and HPV from her ex-. Has started getting raised bumps on the outside of her vagina. Bumps are flesh colored, not red. States no open or draining lesions. Areas are uncomfortable and itchy. Have been there several months. Patient asking if she should see DS or gyne. States she has a gyne she has seen. Advised to see gyne.      SHAUNA JEAN BAPTISTE

## 2022-05-06 NOTE — TELEPHONE ENCOUNTER
Pt called she would like to speak with nurse. She did not disclose what she needed to be seen for.  She wants to know if she can see Dr. Shannan Pizano or she needs to go to GYN?

## 2022-05-17 NOTE — PLAN OF CARE
Patient has echocardiogram scheduled on 6/1/22 and office visit with Dr Almaraz on 6/2/22.    Problem: Impaired Functional Mobility  Goal: Achieve highest/safest level of mobility/gait  Description  Interventions:  - Assess patient's functional ability and stability  - Promote increasing activity/tolerance for mobility and gait  - Educate and eng

## 2022-05-25 NOTE — ED PROVIDER NOTES
Patient Seen in: [facility] In [place]    History   Patient presents with:  Deep Vein Thrombosis (cardiovascular)    Stated Complaint: RIGHT LEG SWELLING     HPI    59-year-old female presents with 6 weeks of right lower extremity karyn MG Oral Tab,  Take 1 tablet (500 mg total) by mouth 2 (two) times daily as needed.    Levothyroxine Sodium 75 MCG Oral Tab,  Take 1 tablet (75 mcg total) by mouth before breakfast.   HYDROcodone-acetaminophen  MG Oral Tab,  Take 1 tablet by mouth ever Lungs: good air exchange and clear   Heart: regular rate rhythm and no murmur   Abdomen: Soft and nontender. No abdominal masses.   No peritoneal signs   Extremities: Right lower extremity with full range of motion at all joints without significant disco extremity. There is no erythema or warmth to the leg to suggest an infectious process. She is afebrile. No trauma reported. Pain to this leg is been present for greater than 6 weeks but she has just recently noted the swelling.   She is currently been t No

## 2022-05-26 RX ORDER — OMEPRAZOLE 20 MG/1
20 CAPSULE, DELAYED RELEASE ORAL
Qty: 90 CAPSULE | Refills: 2 | Status: SHIPPED | OUTPATIENT
Start: 2022-05-26 | End: 2022-06-28

## 2022-05-26 NOTE — TELEPHONE ENCOUNTER
Patient asking for refill on Omrprazole. Pharmacy is [facility] [place], not the [facility] we have on file.

## 2022-06-28 ENCOUNTER — OFFICE VISIT (OUTPATIENT)
Dept: FAMILY MEDICINE CLINIC | Facility: CLINIC | Age: 57
End: 2022-06-28
Payer: MEDICARE

## 2022-06-28 VITALS
HEIGHT: 67 IN | OXYGEN SATURATION: 95 % | TEMPERATURE: 97 F | HEART RATE: 85 BPM | SYSTOLIC BLOOD PRESSURE: 128 MMHG | DIASTOLIC BLOOD PRESSURE: 78 MMHG | WEIGHT: 256 LBS | BODY MASS INDEX: 40.18 KG/M2

## 2022-06-28 DIAGNOSIS — Z00.00 ENCOUNTER FOR MEDICARE ANNUAL WELLNESS EXAM: ICD-10-CM

## 2022-06-28 DIAGNOSIS — K21.00 GASTROESOPHAGEAL REFLUX DISEASE WITH ESOPHAGITIS WITHOUT HEMORRHAGE: ICD-10-CM

## 2022-06-28 DIAGNOSIS — Z12.31 VISIT FOR SCREENING MAMMOGRAM: ICD-10-CM

## 2022-06-28 DIAGNOSIS — E28.2 PCOS (POLYCYSTIC OVARIAN SYNDROME): ICD-10-CM

## 2022-06-28 DIAGNOSIS — Z11.3 SCREEN FOR STD (SEXUALLY TRANSMITTED DISEASE): ICD-10-CM

## 2022-06-28 DIAGNOSIS — D12.6 ADENOMATOUS POLYP OF COLON, UNSPECIFIED PART OF COLON: ICD-10-CM

## 2022-06-28 DIAGNOSIS — M72.2 PLANTAR FASCIITIS, BILATERAL: ICD-10-CM

## 2022-06-28 DIAGNOSIS — Z13.6 SCREENING FOR CARDIOVASCULAR CONDITION: ICD-10-CM

## 2022-06-28 DIAGNOSIS — K44.9 HIATAL HERNIA: ICD-10-CM

## 2022-06-28 DIAGNOSIS — Z00.00 MEDICARE ANNUAL WELLNESS VISIT, SUBSEQUENT: Primary | ICD-10-CM

## 2022-06-28 DIAGNOSIS — Z00.00 ENCOUNTER FOR ANNUAL HEALTH EXAMINATION: ICD-10-CM

## 2022-06-28 DIAGNOSIS — E66.01 MORBID OBESITY WITH BMI OF 40.0-44.9, ADULT (HCC): ICD-10-CM

## 2022-06-28 DIAGNOSIS — E01.0 THYROMEGALY: ICD-10-CM

## 2022-06-28 LAB
ALBUMIN SERPL-MCNC: 4 G/DL (ref 3.4–5)
ALBUMIN/GLOB SERPL: 0.8 {RATIO} (ref 1–2)
ALP LIVER SERPL-CCNC: 145 U/L
ALT SERPL-CCNC: 26 U/L
ANION GAP SERPL CALC-SCNC: 5 MMOL/L (ref 0–18)
AST SERPL-CCNC: 23 U/L (ref 15–37)
BASOPHILS # BLD AUTO: 0.06 X10(3) UL (ref 0–0.2)
BASOPHILS NFR BLD AUTO: 0.7 %
BILIRUB SERPL-MCNC: 0.4 MG/DL (ref 0.1–2)
BUN BLD-MCNC: 17 MG/DL (ref 7–18)
CALCIUM BLD-MCNC: 9.8 MG/DL (ref 8.5–10.1)
CHLORIDE SERPL-SCNC: 106 MMOL/L (ref 98–112)
CHOLEST SERPL-MCNC: 198 MG/DL (ref ?–200)
CO2 SERPL-SCNC: 26 MMOL/L (ref 21–32)
CREAT BLD-MCNC: 0.88 MG/DL
EOSINOPHIL # BLD AUTO: 0.29 X10(3) UL (ref 0–0.7)
EOSINOPHIL NFR BLD AUTO: 3.5 %
ERYTHROCYTE [DISTWIDTH] IN BLOOD BY AUTOMATED COUNT: 15.4 %
FASTING PATIENT LIPID ANSWER: YES
FASTING STATUS PATIENT QL REPORTED: YES
GLOBULIN PLAS-MCNC: 4.8 G/DL (ref 2.8–4.4)
GLUCOSE BLD-MCNC: 89 MG/DL (ref 70–99)
HCT VFR BLD AUTO: 43.9 %
HCV AB SERPL QL IA: NONREACTIVE
HDLC SERPL-MCNC: 67 MG/DL (ref 40–59)
HGB BLD-MCNC: 13.7 G/DL
IMM GRANULOCYTES # BLD AUTO: 0.02 X10(3) UL (ref 0–1)
IMM GRANULOCYTES NFR BLD: 0.2 %
LDLC SERPL CALC-MCNC: 112 MG/DL (ref ?–100)
LYMPHOCYTES # BLD AUTO: 2.67 X10(3) UL (ref 1–4)
LYMPHOCYTES NFR BLD AUTO: 32.4 %
MCH RBC QN AUTO: 27.7 PG (ref 26–34)
MCHC RBC AUTO-ENTMCNC: 31.2 G/DL (ref 31–37)
MCV RBC AUTO: 88.9 FL
MONOCYTES # BLD AUTO: 0.75 X10(3) UL (ref 0.1–1)
MONOCYTES NFR BLD AUTO: 9.1 %
NEUTROPHILS # BLD AUTO: 4.44 X10 (3) UL (ref 1.5–7.7)
NEUTROPHILS # BLD AUTO: 4.44 X10(3) UL (ref 1.5–7.7)
NEUTROPHILS NFR BLD AUTO: 54.1 %
NONHDLC SERPL-MCNC: 131 MG/DL (ref ?–130)
OSMOLALITY SERPL CALC.SUM OF ELEC: 285 MOSM/KG (ref 275–295)
PLATELET # BLD AUTO: 345 10(3)UL (ref 150–450)
POTASSIUM SERPL-SCNC: 4.4 MMOL/L (ref 3.5–5.1)
PROT SERPL-MCNC: 8.8 G/DL (ref 6.4–8.2)
RBC # BLD AUTO: 4.94 X10(6)UL
RPR SER QL: NONREACTIVE
SODIUM SERPL-SCNC: 137 MMOL/L (ref 136–145)
T PALLIDUM AB SER QL IA: REACTIVE
TRIGL SERPL-MCNC: 109 MG/DL (ref 30–149)
TSI SER-ACNC: 1.91 MIU/ML (ref 0.36–3.74)
VLDLC SERPL CALC-MCNC: 19 MG/DL (ref 0–30)
WBC # BLD AUTO: 8.2 X10(3) UL (ref 4–11)

## 2022-06-28 PROCEDURE — G0402 INITIAL PREVENTIVE EXAM: HCPCS | Performed by: FAMILY MEDICINE

## 2022-06-28 PROCEDURE — 86592 SYPHILIS TEST NON-TREP QUAL: CPT | Performed by: FAMILY MEDICINE

## 2022-06-28 PROCEDURE — 80061 LIPID PANEL: CPT | Performed by: FAMILY MEDICINE

## 2022-06-28 PROCEDURE — 87591 N.GONORRHOEAE DNA AMP PROB: CPT | Performed by: FAMILY MEDICINE

## 2022-06-28 PROCEDURE — 87389 HIV-1 AG W/HIV-1&-2 AB AG IA: CPT | Performed by: FAMILY MEDICINE

## 2022-06-28 PROCEDURE — 80053 COMPREHEN METABOLIC PANEL: CPT | Performed by: FAMILY MEDICINE

## 2022-06-28 PROCEDURE — 85025 COMPLETE CBC W/AUTO DIFF WBC: CPT | Performed by: FAMILY MEDICINE

## 2022-06-28 PROCEDURE — 86803 HEPATITIS C AB TEST: CPT | Performed by: FAMILY MEDICINE

## 2022-06-28 PROCEDURE — 86780 TREPONEMA PALLIDUM: CPT | Performed by: FAMILY MEDICINE

## 2022-06-28 PROCEDURE — 84443 ASSAY THYROID STIM HORMONE: CPT | Performed by: FAMILY MEDICINE

## 2022-06-28 PROCEDURE — 87491 CHLMYD TRACH DNA AMP PROBE: CPT | Performed by: FAMILY MEDICINE

## 2022-06-29 ENCOUNTER — TELEPHONE (OUTPATIENT)
Dept: FAMILY MEDICINE CLINIC | Facility: CLINIC | Age: 57
End: 2022-06-29

## 2022-06-29 LAB
C TRACH DNA SPEC QL NAA+PROBE: NEGATIVE
N GONORRHOEA DNA SPEC QL NAA+PROBE: NEGATIVE

## 2022-06-29 NOTE — TELEPHONE ENCOUNTER
Discussed with Dr. Karine Cullen regarding note below - please advise pt that still waiting on final lab results  Currently showing previous infection and not current  Once final lab results - will call pt back with final recommendations      Advised patient of Doctor's note above. Patient verbalized understanding and states she is still \"freaking out\". No further questions at this time.

## 2022-06-29 NOTE — TELEPHONE ENCOUNTER
Called and spoke with pt - pt is \"freaking out\"    States she was with one partner for 30+ years, then was alone for 6 years, then recently with new partner, never had to worry about this before  Now results saying positive and negative?  Does not understand  But pt stating she is \"freaking out and angry\"    Advised pt that Dr. Estefani Starr has not reviewed labs yet - will notify Dr. Estefani Starr and call pt back with recommendations - she v/u    If sending Rx - please send to [phone]    Please advise, thank you

## 2022-06-30 LAB — TREPONEMA PALLIDUM AB BY TP-PA: NON REACTIVE

## 2022-08-11 ENCOUNTER — HOSPITAL ENCOUNTER (OUTPATIENT)
Dept: MAMMOGRAPHY | Age: 57
Discharge: HOME | End: 2022-08-11
Attending: FAMILY MEDICINE
Payer: MEDICARE

## 2022-08-11 DIAGNOSIS — Z00.00 ENCOUNTER FOR MEDICARE ANNUAL WELLNESS EXAM: ICD-10-CM

## 2022-08-11 DIAGNOSIS — Z12.31 VISIT FOR SCREENING MAMMOGRAM: ICD-10-CM

## 2022-08-11 DIAGNOSIS — Z00.00 ENCOUNTER FOR ANNUAL HEALTH EXAMINATION: ICD-10-CM

## 2022-08-11 PROCEDURE — 77063 BREAST TOMOSYNTHESIS BI: CPT | Performed by: FAMILY MEDICINE

## 2022-08-11 PROCEDURE — 77067 SCR MAMMO BI INCL CAD: CPT | Performed by: FAMILY MEDICINE

## 2022-08-29 ENCOUNTER — TELEPHONE (OUTPATIENT)
Dept: FAMILY MEDICINE CLINIC | Facility: CLINIC | Age: 57
End: 2022-08-29

## 2022-08-29 NOTE — TELEPHONE ENCOUNTER
[facility] [id number] - Tammi Henao, [address] [phone], [address]    Phone: [phone] Fax: [phone]     PATIENT REQUESTING REFILL ON ESOMEPRAZOLE

## 2022-09-01 ENCOUNTER — TELEPHONE (OUTPATIENT)
Dept: FAMILY MEDICINE CLINIC | Facility: CLINIC | Age: 57
End: 2022-09-01

## 2022-09-01 NOTE — TELEPHONE ENCOUNTER
PT SENT OVER MEDICAL RECORD REQUEST FOR [employer] FOR [employer]HAB DIS[employer].     REQUESTING RECORDS FROM 9/10/19 TO PRESENT     SENT TO RECORDS

## 2022-09-06 ENCOUNTER — MED REC SCAN ONLY (OUTPATIENT)
Dept: FAMILY MEDICINE CLINIC | Facility: CLINIC | Age: 57
End: 2022-09-06

## 2022-09-15 ENCOUNTER — TELEPHONE (OUTPATIENT)
Dept: FAMILY MEDICINE CLINIC | Facility: CLINIC | Age: 57
End: 2022-09-15

## 2022-09-15 NOTE — TELEPHONE ENCOUNTER
Pt states she needs a letter from Dr. Mary Machado listing all her medical conditions. Pt states it is for the department of rehabilitation services- they will help her get through school but they need to know all of her medical conditions.     Pt states she sent over a fax with the information

## 2022-09-15 NOTE — TELEPHONE ENCOUNTER
Patient is applying for help to go back to school    She is requesting a letter listing all of her medical conditions which will determine if she qualifies    This is a [facility] of Rehab Services offered through social security disability    Please adv    Thank you

## 2022-09-23 ENCOUNTER — TELEPHONE (OUTPATIENT)
Dept: FAMILY MEDICINE CLINIC | Facility: CLINIC | Age: 57
End: 2022-09-23

## 2022-09-23 NOTE — TELEPHONE ENCOUNTER
Please notify Sienna her Letter is in her mychart, and the request for medical records has been sent, that may take up to 30 days to process

## 2022-10-03 ENCOUNTER — TELEPHONE (OUTPATIENT)
Dept: FAMILY MEDICINE CLINIC | Facility: CLINIC | Age: 57
End: 2022-10-03

## 2022-10-03 NOTE — TELEPHONE ENCOUNTER
Patient has been waking up every night vomiting    She states she literally wakes up vomiting and has to catch it in her hand    It has been happening for about 3 weeks    She wakes up fine the next day    Please adv

## 2022-10-03 NOTE — TELEPHONE ENCOUNTER
Well If I had to take a guess, I'd say it probably has something to do with her weight. The more weight you carry the more pressure it puts on your stomach proper causing it to open up at the top and then, the contents come up. She doesn;t smoke any pot, take any CBD gummies?  Cannabis in any form can cause delayed gastric emptying which then means the food stays in there longer

## 2022-10-03 NOTE — TELEPHONE ENCOUNTER
Pt states she stops eating between 6-7pm    Pt states she wakes up every night projectile vomiting no matter what she eats or drinks. She states it can happen anytime during the night    She will get pain on her left side and she has trouble burping or even taking a deep breath when this happens. Saturday she stopped eating at 330pm and still had a night of vomiting. She states she tries to sleep elevated but doesn't always stay elevated. Pt states this is not normal- and can't figure out why? She states this has been going on for a few weeks. Pt states that she also has no real appetite no food tastes good. Pt had COVID in 2020 and was asymptomatic no loss of taste or smell with COVID.

## 2022-10-04 ENCOUNTER — OFFICE VISIT (OUTPATIENT)
Dept: PAIN CLINIC | Facility: CLINIC | Age: 57
End: 2022-10-04
Payer: MEDICARE

## 2022-10-04 ENCOUNTER — TELEPHONE (OUTPATIENT)
Dept: NEUROLOGY | Facility: CLINIC | Age: 57
End: 2022-10-04

## 2022-10-04 VITALS
OXYGEN SATURATION: 98 % | SYSTOLIC BLOOD PRESSURE: 120 MMHG | DIASTOLIC BLOOD PRESSURE: 78 MMHG | WEIGHT: 254.5 LBS | BODY MASS INDEX: 40 KG/M2 | HEART RATE: 72 BPM

## 2022-10-04 DIAGNOSIS — M47.816 LUMBAR FACET ARTHROPATHY: ICD-10-CM

## 2022-10-04 DIAGNOSIS — M46.1 SACROILIITIS (HCC): ICD-10-CM

## 2022-10-04 DIAGNOSIS — M46.1 SACROILIITIS (HCC): Primary | ICD-10-CM

## 2022-10-04 DIAGNOSIS — M47.816 LUMBAR FACET ARTHROPATHY: Primary | ICD-10-CM

## 2022-10-04 PROCEDURE — 99204 OFFICE O/P NEW MOD 45 MIN: CPT | Performed by: ANESTHESIOLOGY

## 2022-10-04 NOTE — TELEPHONE ENCOUNTER
Question Answer   Anesthesia Type Sedation   Provider Tiffanie Jane   Location Lab   Procedure SI Joint   Implants No   Medical clearance requested (will send to Pain Navigator) No   Patient has Medicare coverage?  Yes   Comments (Please list entire procedure name here.) Bilateral sacroiliac joint arthrogram and injection under fluoroscopy

## 2022-10-04 NOTE — TELEPHONE ENCOUNTER
Question Answer   Anesthesia Type Sedation   Provider [facility]   Location Lab   Procedure Facet   Laterality/Level Right diagnostic lumbar facet joint injection at L4-5 and L5-S1 level under fluoroscopy   Implants No   Medical clearance requested (will send to Pain Navigator) No   Patient has Medicare coverage?  Yes

## 2022-10-05 ENCOUNTER — APPOINTMENT (OUTPATIENT)
Dept: GENERAL RADIOLOGY | Facility: HOSPITAL | Age: 57
End: 2022-10-05
Attending: ANESTHESIOLOGY
Payer: MEDICARE

## 2022-10-05 ENCOUNTER — HOSPITAL ENCOUNTER (OUTPATIENT)
Facility: HOSPITAL | Age: 57
Setting detail: HOSPITAL OUTPATIENT SURGERY
Discharge: HOME | End: 2022-10-05
Attending: ANESTHESIOLOGY | Admitting: ANESTHESIOLOGY
Payer: MEDICARE

## 2022-10-05 VITALS
HEART RATE: 73 BPM | TEMPERATURE: 98 F | HEIGHT: 67 IN | BODY MASS INDEX: 39.87 KG/M2 | DIASTOLIC BLOOD PRESSURE: 90 MMHG | SYSTOLIC BLOOD PRESSURE: 124 MMHG | WEIGHT: 254 LBS | OXYGEN SATURATION: 95 % | RESPIRATION RATE: 18 BRPM

## 2022-10-05 DIAGNOSIS — M46.1 SACROILIITIS (HCC): ICD-10-CM

## 2022-10-05 PROCEDURE — 3E0U3BZ INTRODUCTION OF ANESTHETIC AGENT INTO JOINTS, PERCUTANEOUS APPROACH: ICD-10-PCS | Performed by: ANESTHESIOLOGY

## 2022-10-05 PROCEDURE — 27096 INJECT SACROILIAC JOINT: CPT | Performed by: ANESTHESIOLOGY

## 2022-10-05 PROCEDURE — 3E0U33Z INTRODUCTION OF ANTI-INFLAMMATORY INTO JOINTS, PERCUTANEOUS APPROACH: ICD-10-PCS | Performed by: ANESTHESIOLOGY

## 2022-10-05 PROCEDURE — 99152 MOD SED SAME PHYS/QHP 5/>YRS: CPT | Performed by: ANESTHESIOLOGY

## 2022-10-05 RX ORDER — METHYLPREDNISOLONE ACETATE 40 MG/ML
INJECTION, SUSPENSION INTRA-ARTICULAR; INTRALESIONAL; INTRAMUSCULAR; SOFT TISSUE
Status: DISCONTINUED | OUTPATIENT
Start: 2022-10-05 | End: 2022-10-05

## 2022-10-05 RX ORDER — LIDOCAINE HYDROCHLORIDE 10 MG/ML
INJECTION, SOLUTION EPIDURAL; INFILTRATION; INTRACAUDAL; PERINEURAL
Status: DISCONTINUED | OUTPATIENT
Start: 2022-10-05 | End: 2022-10-05

## 2022-10-05 RX ORDER — MIDAZOLAM HYDROCHLORIDE 1 MG/ML
INJECTION INTRAMUSCULAR; INTRAVENOUS
Status: DISCONTINUED | OUTPATIENT
Start: 2022-10-05 | End: 2022-10-05

## 2022-10-05 RX ORDER — ONDANSETRON 2 MG/ML
4 INJECTION INTRAMUSCULAR; INTRAVENOUS ONCE AS NEEDED
Status: DISCONTINUED | OUTPATIENT
Start: 2022-10-05 | End: 2022-10-05

## 2022-10-05 RX ORDER — SODIUM CHLORIDE, SODIUM LACTATE, POTASSIUM CHLORIDE, CALCIUM CHLORIDE 600; 310; 30; 20 MG/100ML; MG/100ML; MG/100ML; MG/100ML
100 INJECTION, SOLUTION INTRAVENOUS CONTINUOUS
Status: DISCONTINUED | OUTPATIENT
Start: 2022-10-05 | End: 2022-10-05

## 2022-10-05 RX ORDER — DIPHENHYDRAMINE HYDROCHLORIDE 50 MG/ML
50 INJECTION INTRAMUSCULAR; INTRAVENOUS ONCE AS NEEDED
Status: DISCONTINUED | OUTPATIENT
Start: 2022-10-05 | End: 2022-10-05

## 2022-10-05 NOTE — INTERVAL H&P NOTE
Pre-op Diagnosis: Sacroiliitis ([facility] [address] [M46.1]    The above referenced H&P was reviewed by Shannon Casper MD on 10/5/2022, the patient was examined and no significant changes have occurred in the patient's condition since the H&P was performed. I discussed with the patient and/or legal representative the potential benefits, risks and side effects of this procedure; the likelihood of the patient achieving goals; and potential problems that might occur during recuperation. I discussed reasonable alternatives to the procedure, including risks, benefits and side effects related to the alternatives and risks related to not receiving this procedure. We will proceed with procedure as planned.

## 2022-10-05 NOTE — OPERATIVE REPORT
[facility]  Operative Report  10/5/2022     Karime Solis Patient Status:  Hospital Outpatient Surgery    1965 MRN [id number]   Location [address] [address] Attending No att. providers found   Hosp Day # 0 PCP Noel Metcalf DO     Indication: Dena Sanders is a 64year old female patient with chronic low back pain failed conservative treatment with medication and physical therapy was here for bilateral sacroiliac urinary retention. Preoperative Diagnosis:  Sacroiliitis (HCC) [M46.1]    Postoperative Diagnosis: Same as above. Procedure performed: Bilateral sacroiliac joint arthrogram and injection under fluoroscopy    Anesthesia: Local and IV Sedation. EBL: Less than 1 ml. Procedure Description:   After reviewing the patient's history and performing a focused physical examination, the diagnosis was confirmed and contraindications such as infection and coagulopathy were ruled out. Following review of potential side effects and complications, including but not necessarily limited to infection, allergic reaction, local tissue breakdown, nerve injury, and paresis, the patient indicated they understood and agreed to proceed. After obtaining the informed consent, the patient was brought to the procedure room and monitored. Per my order and under my supervision, the patient was sedated with intermittent intravenous doses of versed and fentanyl. The vital signs were monitored and recorded by an experienced RN. The procedure started after the patient was adequately sedated. The moderate intravenous conscious sedation was provided for 10 minutes. In the prone position, the patient's lumbar and sacral areas were prepped and draped in sterile fashion. The subcutaneous lidocaine was instilled into the superficial soft tissues for local anesthesia. Under fluoroscopic guidance, the anterior and posterior aspects of the sacroiliac joint were overlapped.  A 22-gauge, Quincke spinal needle was advanced into the middle portion of the first sacroiliac joint. After the needle  positions were confirmed by AP and lateral views of fluoroscopy, 1 cc Omnipaque-240 was injected into the sacroiliac joint. There was a nice sacroiliac joint arthrogram revealed. After that methylprednisolone 40 mg with 2 cc of 1% lidocaine was injected. The needle was flushed with 1 cc of 1% lidocaine. Another 22-gauge, Quincke spinal needle was advanced into the middle portion of the first sacroiliac joint. After the needle  positions were confirmed by AP and lateral views of fluoroscopy, 1 cc Omnipaque-240 was injected into the sacroiliac joint. There was a nice sacroiliac joint arthrogram revealed. After that methylprednisolone 40 mg with 2 cc of 1% lidocaine was injected. The needle was flushed with 1 cc of 1% lidocaine. The needles were removed with stylet in situ. The patient tolerated the procedure very well. There was no subjective or objective loss of motor strength. The patient was observed until discharge criteria met. Discharge instructions were given and patient was released to a responsible adult. Complications: None. Follow up: The patient was followed in the pain clinic as needed basis.       Coty Nagel MD

## 2022-10-11 ENCOUNTER — PATIENT MESSAGE (OUTPATIENT)
Dept: FAMILY MEDICINE CLINIC | Facility: CLINIC | Age: 57
End: 2022-10-11

## 2022-10-12 ENCOUNTER — APPOINTMENT (OUTPATIENT)
Dept: GENERAL RADIOLOGY | Facility: HOSPITAL | Age: 57
End: 2022-10-12
Attending: ANESTHESIOLOGY
Payer: MEDICARE

## 2022-10-12 ENCOUNTER — HOSPITAL ENCOUNTER (OUTPATIENT)
Facility: HOSPITAL | Age: 57
Setting detail: HOSPITAL OUTPATIENT SURGERY
Discharge: HOME | End: 2022-10-12
Attending: ANESTHESIOLOGY | Admitting: ANESTHESIOLOGY
Payer: MEDICARE

## 2022-10-12 VITALS
RESPIRATION RATE: 18 BRPM | TEMPERATURE: 98 F | DIASTOLIC BLOOD PRESSURE: 79 MMHG | OXYGEN SATURATION: 96 % | BODY MASS INDEX: 39.87 KG/M2 | HEART RATE: 73 BPM | SYSTOLIC BLOOD PRESSURE: 122 MMHG | WEIGHT: 254 LBS | HEIGHT: 67 IN

## 2022-10-12 DIAGNOSIS — M47.816 LUMBAR FACET ARTHROPATHY: ICD-10-CM

## 2022-10-12 PROCEDURE — 64493 INJ PARAVERT F JNT L/S 1 LEV: CPT | Performed by: ANESTHESIOLOGY

## 2022-10-12 PROCEDURE — BR161ZZ FLUOROSCOPY OF LUMBAR FACET JOINT(S) USING LOW OSMOLAR CONTRAST: ICD-10-PCS | Performed by: ANESTHESIOLOGY

## 2022-10-12 PROCEDURE — 3E0U33Z INTRODUCTION OF ANTI-INFLAMMATORY INTO JOINTS, PERCUTANEOUS APPROACH: ICD-10-PCS | Performed by: ANESTHESIOLOGY

## 2022-10-12 PROCEDURE — 3E0U3BZ INTRODUCTION OF ANESTHETIC AGENT INTO JOINTS, PERCUTANEOUS APPROACH: ICD-10-PCS | Performed by: ANESTHESIOLOGY

## 2022-10-12 PROCEDURE — 64494 INJ PARAVERT F JNT L/S 2 LEV: CPT | Performed by: ANESTHESIOLOGY

## 2022-10-12 RX ORDER — MIDAZOLAM HYDROCHLORIDE 1 MG/ML
INJECTION INTRAMUSCULAR; INTRAVENOUS
Status: DISCONTINUED | OUTPATIENT
Start: 2022-10-12 | End: 2022-10-12

## 2022-10-12 RX ORDER — METHYLPREDNISOLONE ACETATE 40 MG/ML
INJECTION, SUSPENSION INTRA-ARTICULAR; INTRALESIONAL; INTRAMUSCULAR; SOFT TISSUE
Status: DISCONTINUED | OUTPATIENT
Start: 2022-10-12 | End: 2022-10-12

## 2022-10-12 RX ORDER — LIDOCAINE HYDROCHLORIDE 10 MG/ML
INJECTION, SOLUTION EPIDURAL; INFILTRATION; INTRACAUDAL; PERINEURAL
Status: DISCONTINUED | OUTPATIENT
Start: 2022-10-12 | End: 2022-10-12

## 2022-10-12 RX ORDER — ONDANSETRON 2 MG/ML
4 INJECTION INTRAMUSCULAR; INTRAVENOUS ONCE AS NEEDED
Status: DISCONTINUED | OUTPATIENT
Start: 2022-10-12 | End: 2022-10-12

## 2022-10-12 RX ORDER — SODIUM CHLORIDE, SODIUM LACTATE, POTASSIUM CHLORIDE, CALCIUM CHLORIDE 600; 310; 30; 20 MG/100ML; MG/100ML; MG/100ML; MG/100ML
100 INJECTION, SOLUTION INTRAVENOUS CONTINUOUS
Status: DISCONTINUED | OUTPATIENT
Start: 2022-10-12 | End: 2022-10-12

## 2022-10-12 RX ORDER — DIPHENHYDRAMINE HYDROCHLORIDE 50 MG/ML
50 INJECTION INTRAMUSCULAR; INTRAVENOUS ONCE AS NEEDED
Status: DISCONTINUED | OUTPATIENT
Start: 2022-10-12 | End: 2022-10-12

## 2022-10-12 NOTE — INTERVAL H&P NOTE
Pre-op Diagnosis: Lumbar facet arthropathy [M47.816]    The above referenced H&P was reviewed by Ashley Ramos MD on 10/12/2022, the patient was examined and no significant changes have occurred in the patient's condition since the H&P was performed. I discussed with the patient and/or legal representative the potential benefits, risks and side effects of this procedure; the likelihood of the patient achieving goals; and potential problems that might occur during recuperation. I discussed reasonable alternatives to the procedure, including risks, benefits and side effects related to the alternatives and risks related to not receiving this procedure. We will proceed with procedure as planned.

## 2022-10-12 NOTE — OPERATIVE REPORT
[facility]  Operative Report  10/12/2022     Eringaudencio Palm Patient Status:  Hospital Outpatient Surgery    1965 MRN [id number]   Location [address] [address] Attending No att. providers found   Hosp Day # 0 PCP Jose Albert DO     Indication: Leonila Prince is a 64year old female patient with chronic low back pain failed conservative treatment with medication and physical therapy was here for right diagnostic lumbar facet joint injection. Preoperative Diagnosis:  Lumbar facet arthropathy [M47.816]    Postoperative Diagnosis: Same as above. Procedure performed: Right diagnostic lumbar facet joint injection at L4-L5 and L5-S1 level under fluoroscopy    Anesthesia: Local and IV Sedation. EBL: Less than 1 ml. Procedure Description:  After reviewing the patient's history and performing a focused physical examination, the diagnosis was confirmed and contraindications such as infection and coagulopathy were ruled out. Following review of allergies and review of potential side effects and complications, including but not necessarily limited to infection, allergic reaction, local tissue breakdown, nerve injury, and paresis, the patient indicated they understood and agreed to proceed. After obtaining the informed consent, the patient was brought to the procedure room and monitored. Per my order and under my supervision, the patient was sedated with intermittent intravenous doses of versed and fentanyl. The vital signs were monitored and recorded by an experienced RN. The procedure started after the patient was adequately sedated. Moderate intravenous sedation was given for 9 minutes. In the prone position, following sterile prep and drape of the lumbar region, the corresponding facet joints were identified under fluoroscopy. The skin was anesthetized via 25-gauge 1.5\" needle with approximately 2 mL of 1% lidocaine.   A 22-gauge 3.5\" Quincke spinal needle was introduced and advanced into each corresponding facet joint at right L4-5 and L5-S1 level atraumatically under fluoroscopic guidance. Following negative aspiration for CSF and blood, approximately 1 mL of 1% lidocaine with 20 mg of methylprednisolone was injected into each joint without complication. The needle was withdrawn with stylet in situ after being flushed with 0.5 mL lidocaine. The patient tolerated procedure very well. The patient was observed until discharge criteria met. Discharge instructions were given and patient was released to a responsible adult. Complications: None. Follow up: The patient was followed in the pain clinic as needed basis.     Shannon Casper MD

## 2022-10-12 NOTE — INTERVAL H&P NOTE
Pre-op Diagnosis: Lumbar facet arthropathy [M47.816]    The above referenced H&P was reviewed by Behzad Washington MD on 10/12/2022, the patient was examined and no significant changes have occurred in the patient's condition since the H&P was performed. I discussed with the patient and/or legal representative the potential benefits, risks and side effects of this procedure; the likelihood of the patient achieving goals; and potential problems that might occur during recuperation. I discussed reasonable alternatives to the procedure, including risks, benefits and side effects related to the alternatives and risks related to not receiving this procedure. We will proceed with procedure as planned.

## 2022-10-19 ENCOUNTER — APPOINTMENT (OUTPATIENT)
Dept: GENERAL RADIOLOGY | Facility: HOSPITAL | Age: 57
End: 2022-10-19
Attending: ANESTHESIOLOGY
Payer: MEDICARE

## 2022-10-19 ENCOUNTER — HOSPITAL ENCOUNTER (OUTPATIENT)
Facility: HOSPITAL | Age: 57
Setting detail: HOSPITAL OUTPATIENT SURGERY
Discharge: HOME | End: 2022-10-19
Attending: ANESTHESIOLOGY | Admitting: ANESTHESIOLOGY
Payer: MEDICARE

## 2022-10-19 VITALS
OXYGEN SATURATION: 95 % | RESPIRATION RATE: 16 BRPM | SYSTOLIC BLOOD PRESSURE: 128 MMHG | BODY MASS INDEX: 39.87 KG/M2 | HEIGHT: 67 IN | HEART RATE: 77 BPM | DIASTOLIC BLOOD PRESSURE: 84 MMHG | TEMPERATURE: 98 F | WEIGHT: 254 LBS

## 2022-10-19 DIAGNOSIS — M47.816 LUMBAR FACET ARTHROPATHY: ICD-10-CM

## 2022-10-19 PROCEDURE — 3E0U3BZ INTRODUCTION OF ANESTHETIC AGENT INTO JOINTS, PERCUTANEOUS APPROACH: ICD-10-PCS | Performed by: ANESTHESIOLOGY

## 2022-10-19 PROCEDURE — 3E0U33Z INTRODUCTION OF ANTI-INFLAMMATORY INTO JOINTS, PERCUTANEOUS APPROACH: ICD-10-PCS | Performed by: ANESTHESIOLOGY

## 2022-10-19 PROCEDURE — BR16ZZZ FLUOROSCOPY OF LUMBAR FACET JOINT(S): ICD-10-PCS | Performed by: ANESTHESIOLOGY

## 2022-10-19 PROCEDURE — 64494 INJ PARAVERT F JNT L/S 2 LEV: CPT | Performed by: ANESTHESIOLOGY

## 2022-10-19 PROCEDURE — 64493 INJ PARAVERT F JNT L/S 1 LEV: CPT | Performed by: ANESTHESIOLOGY

## 2022-10-19 RX ORDER — METHYLPREDNISOLONE ACETATE 40 MG/ML
INJECTION, SUSPENSION INTRA-ARTICULAR; INTRALESIONAL; INTRAMUSCULAR; SOFT TISSUE
Status: DISCONTINUED | OUTPATIENT
Start: 2022-10-19 | End: 2022-10-19

## 2022-10-19 RX ORDER — DIPHENHYDRAMINE HYDROCHLORIDE 50 MG/ML
50 INJECTION INTRAMUSCULAR; INTRAVENOUS ONCE AS NEEDED
Status: DISCONTINUED | OUTPATIENT
Start: 2022-10-19 | End: 2022-10-19

## 2022-10-19 RX ORDER — SODIUM CHLORIDE, SODIUM LACTATE, POTASSIUM CHLORIDE, CALCIUM CHLORIDE 600; 310; 30; 20 MG/100ML; MG/100ML; MG/100ML; MG/100ML
100 INJECTION, SOLUTION INTRAVENOUS CONTINUOUS
Status: DISCONTINUED | OUTPATIENT
Start: 2022-10-19 | End: 2022-10-19

## 2022-10-19 RX ORDER — LIDOCAINE HYDROCHLORIDE 10 MG/ML
INJECTION, SOLUTION EPIDURAL; INFILTRATION; INTRACAUDAL; PERINEURAL
Status: DISCONTINUED | OUTPATIENT
Start: 2022-10-19 | End: 2022-10-19

## 2022-10-19 RX ORDER — ONDANSETRON 2 MG/ML
4 INJECTION INTRAMUSCULAR; INTRAVENOUS ONCE AS NEEDED
Status: DISCONTINUED | OUTPATIENT
Start: 2022-10-19 | End: 2022-10-19

## 2022-10-19 RX ORDER — MIDAZOLAM HYDROCHLORIDE 1 MG/ML
INJECTION INTRAMUSCULAR; INTRAVENOUS
Status: DISCONTINUED | OUTPATIENT
Start: 2022-10-19 | End: 2022-10-19

## 2022-10-19 NOTE — INTERVAL H&P NOTE
Pre-op Diagnosis: Lumbar facet arthropathy [M47.816]    The above referenced H&P was reviewed by Brayden Marin MD on 10/19/2022, the patient was examined and no significant changes have occurred in the patient's condition since the H&P was performed. I discussed with the patient and/or legal representative the potential benefits, risks and side effects of this procedure; the likelihood of the patient achieving goals; and potential problems that might occur during recuperation. I discussed reasonable alternatives to the procedure, including risks, benefits and side effects related to the alternatives and risks related to not receiving this procedure. We will proceed with procedure as planned.

## 2022-10-19 NOTE — OPERATIVE REPORT
[facility]  Operative Report  10/19/2022     Ree Tre Patient Status:  Hospital Outpatient Surgery    1965 MRN [id number]   Location [address] [address] Attending No att. providers found   Hosp Day # 0 PCP Leslie Calloway DO     Indication: Iraida Ruiz is a 64year old female patient with chronic low back pain failed conservative treatment with medication and physical therapy was here for a left diagnostic lumbar facet joint injection. Preoperative Diagnosis:  Lumbar facet arthropathy [M47.816]    Postoperative Diagnosis: Same as above. Procedure performed: Left diagnostic lumbar facet joint injection at L4-L5 and L5-S1 level under fluoroscopy    Anesthesia: Local and IV Sedation. EBL: Less than 1 ml. Procedure Description:  After reviewing the patient's history and performing a focused physical examination, the diagnosis was confirmed and contraindications such as infection and coagulopathy were ruled out. Following review of allergies and review of potential side effects and complications, including but not necessarily limited to infection, allergic reaction, local tissue breakdown, nerve injury, and paresis, the patient indicated they understood and agreed to proceed. After obtaining the informed consent, the patient was brought to the procedure room and monitored. Per my order and under my supervision, the patient was sedated with intermittent intravenous doses of versed and fentanyl. The vital signs were monitored and recorded by an experienced RN. The procedure started after the patient was adequately sedated. Moderate intravenous sedation was given for 7 minutes. In the prone position, following sterile prep and drape of the lumbar region, the corresponding facet joints were identified under fluoroscopy. The skin was anesthetized via 25-gauge 1.5\" needle with approximately 2 mL of 1% lidocaine.   A 22-gauge 3.5\" Quincke spinal needle was introduced and advanced into each corresponding facet joint at left L4-5 and L5-S1 level atraumatically under fluoroscopic guidance. Following negative aspiration for CSF and blood, approximately 1 mL of 1% lidocaine with 20 mg of methylprednisolone was injected into each joint without complication. The needle was withdrawn with stylet in situ after being flushed with 0.5 mL lidocaine. The patient tolerated procedure very well. The patient was observed until discharge criteria met. Discharge instructions were given and patient was released to a responsible adult. Complications: None. Follow up: The patient was followed in the pain clinic as needed basis.     Odalis Walden MD

## 2022-10-20 ENCOUNTER — LAB ENCOUNTER (OUTPATIENT)
Dept: LAB | Age: 57
End: 2022-10-20
Attending: NURSE PRACTITIONER
Payer: MEDICARE

## 2022-10-20 ENCOUNTER — OFFICE VISIT (OUTPATIENT)
Dept: INTERNAL MEDICINE CLINIC | Facility: CLINIC | Age: 57
End: 2022-10-20
Payer: MEDICARE

## 2022-10-20 VITALS
BODY MASS INDEX: 39.25 KG/M2 | RESPIRATION RATE: 16 BRPM | HEART RATE: 80 BPM | DIASTOLIC BLOOD PRESSURE: 84 MMHG | WEIGHT: 253 LBS | SYSTOLIC BLOOD PRESSURE: 132 MMHG | HEIGHT: 67.5 IN

## 2022-10-20 DIAGNOSIS — K44.9 HIATAL HERNIA: ICD-10-CM

## 2022-10-20 DIAGNOSIS — E28.2 PCOS (POLYCYSTIC OVARIAN SYNDROME): ICD-10-CM

## 2022-10-20 DIAGNOSIS — F90.9 ATTENTION DEFICIT HYPERACTIVITY DISORDER (ADHD), UNSPECIFIED ADHD TYPE: ICD-10-CM

## 2022-10-20 DIAGNOSIS — E66.01 CLASS 2 SEVERE OBESITY WITH SERIOUS COMORBIDITY AND BODY MASS INDEX (BMI) OF 38.0 TO 38.9 IN ADULT, UNSPECIFIED OBESITY TYPE (HCC): ICD-10-CM

## 2022-10-20 DIAGNOSIS — Z86.59 HISTORY OF POSTTRAUMATIC STRESS DISORDER (PTSD): ICD-10-CM

## 2022-10-20 DIAGNOSIS — Z51.81 ENCOUNTER FOR THERAPEUTIC DRUG MONITORING: Primary | ICD-10-CM

## 2022-10-20 DIAGNOSIS — E88.81 INSULIN RESISTANCE: ICD-10-CM

## 2022-10-20 DIAGNOSIS — Z51.81 ENCOUNTER FOR THERAPEUTIC DRUG MONITORING: ICD-10-CM

## 2022-10-20 DIAGNOSIS — M15.9 OSTEOARTHRITIS OF MULTIPLE JOINTS, UNSPECIFIED OSTEOARTHRITIS TYPE: ICD-10-CM

## 2022-10-20 LAB — VIT B12 SERPL-MCNC: 392 PG/ML (ref 193–986)

## 2022-10-20 PROCEDURE — 82607 VITAMIN B-12: CPT

## 2022-10-20 PROCEDURE — 99204 OFFICE O/P NEW MOD 45 MIN: CPT | Performed by: NURSE PRACTITIONER

## 2022-10-20 PROCEDURE — 36415 COLL VENOUS BLD VENIPUNCTURE: CPT

## 2022-10-20 RX ORDER — DIETHYLPROPION HYDROCHLORIDE 25 MG/1
1 TABLET ORAL 2 TIMES DAILY WITH MEALS
Qty: 90 TABLET | Refills: 0 | Status: SHIPPED | OUTPATIENT
Start: 2022-10-20 | End: 2022-11-19

## 2022-11-08 ENCOUNTER — OFFICE VISIT (OUTPATIENT)
Dept: PAIN CLINIC | Facility: CLINIC | Age: 57
End: 2022-11-08
Payer: MEDICARE

## 2022-11-08 VITALS — OXYGEN SATURATION: 98 % | SYSTOLIC BLOOD PRESSURE: 110 MMHG | DIASTOLIC BLOOD PRESSURE: 70 MMHG | HEART RATE: 78 BPM

## 2022-11-08 DIAGNOSIS — M47.816 LUMBAR FACET ARTHROPATHY: Primary | ICD-10-CM

## 2022-11-08 PROCEDURE — 99214 OFFICE O/P EST MOD 30 MIN: CPT | Performed by: ANESTHESIOLOGY

## 2022-11-08 NOTE — PROGRESS NOTES
Last procedure: LEFT DIAGNOSTIC LUMBAR FACET JOINT INJECTION AT L4-L5 AND L5-S1 LEVEL UNDER FLUOROSCOPY WITH INTRAVENOUS SEDATION  Date: 10/19/22  Percentage of relief obtained: 85%  Duration of relief: current     Current Pain Score: 3/10

## 2022-12-02 ENCOUNTER — TELEPHONE (OUTPATIENT)
Dept: FAMILY MEDICINE CLINIC | Facility: CLINIC | Age: 57
End: 2022-12-02

## 2022-12-02 NOTE — TELEPHONE ENCOUNTER
LOV 06/28/2022 with Dr. Artem Hayes wellness    Please see TE 09/15/22 and 09/23/22 - Letter from [place] sent    Please review note below    Please advise, thank you

## 2022-12-02 NOTE — TELEPHONE ENCOUNTER
PATIENT CALLING REGARDING SHE NEEDS. DR JEAN BAPTISTE HAD WRITTEN A LETTER BACK ON 9/23. PATIENT SAYS SHE NEEDS A LETTER STATING HER CONDITION, LIKE CAN'T WALK AND WHY. PATIENT WAS INFORMED THAT DR JEAN BAPTISTE AND NURSE WILL RETURN ON Monday AND PATIENT IS FINE WITH THAT.

## 2022-12-15 ENCOUNTER — OFFICE VISIT (OUTPATIENT)
Dept: FAMILY MEDICINE CLINIC | Facility: CLINIC | Age: 57
End: 2022-12-15
Payer: MEDICARE

## 2022-12-15 VITALS
RESPIRATION RATE: 20 BRPM | BODY MASS INDEX: 39.25 KG/M2 | DIASTOLIC BLOOD PRESSURE: 80 MMHG | HEIGHT: 67.5 IN | WEIGHT: 253 LBS | SYSTOLIC BLOOD PRESSURE: 124 MMHG | OXYGEN SATURATION: 95 % | HEART RATE: 83 BPM | TEMPERATURE: 98 F

## 2022-12-15 DIAGNOSIS — R11.12 PROJECTILE VOMITING WITH NAUSEA: ICD-10-CM

## 2022-12-15 DIAGNOSIS — A04.8 H. PYLORI INFECTION: ICD-10-CM

## 2022-12-15 DIAGNOSIS — K21.00 GASTROESOPHAGEAL REFLUX DISEASE WITH ESOPHAGITIS WITHOUT HEMORRHAGE: Primary | ICD-10-CM

## 2022-12-15 DIAGNOSIS — R10.10 PAIN OF UPPER ABDOMEN: ICD-10-CM

## 2022-12-15 DIAGNOSIS — K44.9 PARAESOPHAGEAL HERNIA: ICD-10-CM

## 2022-12-15 LAB
ALBUMIN SERPL-MCNC: 4 G/DL (ref 3.4–5)
ALBUMIN/GLOB SERPL: 1 {RATIO} (ref 1–2)
ALP LIVER SERPL-CCNC: 117 U/L
ALT SERPL-CCNC: 38 U/L
AMYLASE SERPL-CCNC: 67 U/L (ref 25–115)
ANION GAP SERPL CALC-SCNC: 5 MMOL/L (ref 0–18)
AST SERPL-CCNC: 34 U/L (ref 15–37)
BASOPHILS # BLD AUTO: 0.06 X10(3) UL (ref 0–0.2)
BASOPHILS NFR BLD AUTO: 0.7 %
BILIRUB SERPL-MCNC: 0.4 MG/DL (ref 0.1–2)
BUN BLD-MCNC: 17 MG/DL (ref 7–18)
CALCIUM BLD-MCNC: 10.1 MG/DL (ref 8.5–10.1)
CHLORIDE SERPL-SCNC: 108 MMOL/L (ref 98–112)
CO2 SERPL-SCNC: 27 MMOL/L (ref 21–32)
CREAT BLD-MCNC: 0.78 MG/DL
EOSINOPHIL # BLD AUTO: 0.44 X10(3) UL (ref 0–0.7)
EOSINOPHIL NFR BLD AUTO: 4.8 %
ERYTHROCYTE [DISTWIDTH] IN BLOOD BY AUTOMATED COUNT: 15.2 %
FASTING STATUS PATIENT QL REPORTED: YES
GFR SERPLBLD BASED ON 1.73 SQ M-ARVRAT: 89 ML/MIN/1.73M2 (ref 60–?)
GLOBULIN PLAS-MCNC: 4.1 G/DL (ref 2.8–4.4)
GLUCOSE BLD-MCNC: 90 MG/DL (ref 70–99)
HCT VFR BLD AUTO: 45.5 %
HGB BLD-MCNC: 14.2 G/DL
IMM GRANULOCYTES # BLD AUTO: 0.03 X10(3) UL (ref 0–1)
IMM GRANULOCYTES NFR BLD: 0.3 %
LIPASE SERPL-CCNC: 176 U/L (ref 73–393)
LYMPHOCYTES # BLD AUTO: 2.34 X10(3) UL (ref 1–4)
LYMPHOCYTES NFR BLD AUTO: 25.6 %
MCH RBC QN AUTO: 28.4 PG (ref 26–34)
MCHC RBC AUTO-ENTMCNC: 31.2 G/DL (ref 31–37)
MCV RBC AUTO: 91 FL
MONOCYTES # BLD AUTO: 0.88 X10(3) UL (ref 0.1–1)
MONOCYTES NFR BLD AUTO: 9.6 %
NEUTROPHILS # BLD AUTO: 5.38 X10 (3) UL (ref 1.5–7.7)
NEUTROPHILS # BLD AUTO: 5.38 X10(3) UL (ref 1.5–7.7)
NEUTROPHILS NFR BLD AUTO: 59 %
OSMOLALITY SERPL CALC.SUM OF ELEC: 291 MOSM/KG (ref 275–295)
PLATELET # BLD AUTO: 398 10(3)UL (ref 150–450)
POTASSIUM SERPL-SCNC: 4.5 MMOL/L (ref 3.5–5.1)
PROT SERPL-MCNC: 8.1 G/DL (ref 6.4–8.2)
RBC # BLD AUTO: 5 X10(6)UL
SODIUM SERPL-SCNC: 140 MMOL/L (ref 136–145)
WBC # BLD AUTO: 9.1 X10(3) UL (ref 4–11)

## 2022-12-15 PROCEDURE — 99214 OFFICE O/P EST MOD 30 MIN: CPT | Performed by: FAMILY MEDICINE

## 2022-12-15 PROCEDURE — 87338 HPYLORI STOOL AG IA: CPT

## 2022-12-15 PROCEDURE — 82150 ASSAY OF AMYLASE: CPT | Performed by: FAMILY MEDICINE

## 2022-12-15 PROCEDURE — 85025 COMPLETE CBC W/AUTO DIFF WBC: CPT | Performed by: FAMILY MEDICINE

## 2022-12-15 PROCEDURE — 80053 COMPREHEN METABOLIC PANEL: CPT | Performed by: FAMILY MEDICINE

## 2022-12-15 PROCEDURE — 83690 ASSAY OF LIPASE: CPT | Performed by: FAMILY MEDICINE

## 2022-12-16 ENCOUNTER — LAB ENCOUNTER (OUTPATIENT)
Dept: LAB | Age: 57
End: 2022-12-16
Attending: INTERNAL MEDICINE
Payer: MEDICARE

## 2022-12-16 ENCOUNTER — TELEPHONE (OUTPATIENT)
Dept: FAMILY MEDICINE CLINIC | Facility: CLINIC | Age: 57
End: 2022-12-16

## 2022-12-16 DIAGNOSIS — B96.81 HELICOBACTER POSITIVE GASTRITIS: ICD-10-CM

## 2022-12-16 DIAGNOSIS — K29.70 HELICOBACTER POSITIVE GASTRITIS: ICD-10-CM

## 2022-12-16 NOTE — TELEPHONE ENCOUNTER
----- Message from Spike Doll, DO sent at 12/16/2022  6:48 AM CST -----  You can notify Ching Cummings that her blood sugar kidney function and liver function test and her pancreas all look good. I really think she needs to follow up with PHOEBE PERES because she has some issues going on with her esophagus and the rest of her digestive system I believe she has an appointment already scheduled and if not make sure that she does that and completes her H. pylori test as well

## 2022-12-20 ENCOUNTER — TELEMEDICINE (OUTPATIENT)
Dept: INTERNAL MEDICINE CLINIC | Facility: CLINIC | Age: 57
End: 2022-12-20

## 2022-12-20 DIAGNOSIS — E88.81 INSULIN RESISTANCE: ICD-10-CM

## 2022-12-20 DIAGNOSIS — K21.00 GASTROESOPHAGEAL REFLUX DISEASE WITH ESOPHAGITIS WITHOUT HEMORRHAGE: ICD-10-CM

## 2022-12-20 DIAGNOSIS — E66.01 CLASS 2 SEVERE OBESITY WITH SERIOUS COMORBIDITY AND BODY MASS INDEX (BMI) OF 38.0 TO 38.9 IN ADULT, UNSPECIFIED OBESITY TYPE (HCC): ICD-10-CM

## 2022-12-20 DIAGNOSIS — M15.9 OSTEOARTHRITIS OF MULTIPLE JOINTS, UNSPECIFIED OSTEOARTHRITIS TYPE: ICD-10-CM

## 2022-12-20 DIAGNOSIS — E28.2 PCOS (POLYCYSTIC OVARIAN SYNDROME): ICD-10-CM

## 2022-12-20 DIAGNOSIS — Z51.81 ENCOUNTER FOR THERAPEUTIC DRUG MONITORING: Primary | ICD-10-CM

## 2022-12-20 PROBLEM — E66.812 CLASS 2 SEVERE OBESITY WITH SERIOUS COMORBIDITY AND BODY MASS INDEX (BMI) OF 38.0 TO 38.9 IN ADULT (HCC): Status: ACTIVE | Noted: 2022-12-20

## 2022-12-20 LAB — H PYLORI AG STL QL IA: NEGATIVE

## 2022-12-20 PROCEDURE — 99213 OFFICE O/P EST LOW 20 MIN: CPT | Performed by: NURSE PRACTITIONER

## 2022-12-20 RX ORDER — PANCRELIPASE LIPASE, PANCRELIPASE PROTEASE, PANCRELIPASE AMYLASE 40000; 126000; 168000 [USP'U]/1; [USP'U]/1; [USP'U]/1
CAPSULE, DELAYED RELEASE ORAL
Qty: 720 CAPSULE | Refills: 1 | Status: SHIPPED | OUTPATIENT
Start: 2022-12-20

## 2022-12-20 RX ORDER — DIETHYLPROPION HYDROCHLORIDE 75 MG/1
1 TABLET ORAL EVERY MORNING
Qty: 30 TABLET | Refills: 1 | Status: SHIPPED | OUTPATIENT
Start: 2022-12-20

## 2023-01-16 ENCOUNTER — TELEPHONE (OUTPATIENT)
Dept: FAMILY MEDICINE CLINIC | Facility: CLINIC | Age: 58
End: 2023-01-16

## 2023-01-16 NOTE — TELEPHONE ENCOUNTER
I'd have to go back and look I vaguely remember discussing at some point in the past 10 years, I'd just have to  find it

## 2023-01-16 NOTE — TELEPHONE ENCOUNTER
Patient requesting help locating something in her chart    This is pertaining to a diagnosis with approximate date Oct/Dec 2019    Please adv  Thank you
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
## 2023-01-23 ENCOUNTER — TELEPHONE (OUTPATIENT)
Dept: FAMILY MEDICINE CLINIC | Facility: CLINIC | Age: 58
End: 2023-01-23

## 2023-01-23 NOTE — TELEPHONE ENCOUNTER
See previous encounter    Patient calling for an update if anything was found in her chart regarding PTSD or ADHD    Please adv  Thank you

## 2023-01-23 NOTE — TELEPHONE ENCOUNTER
Routing to provider- pt was provided with that information last week. She is looking for information regarding her having ADHD? Do we have that in the chart?

## 2023-01-24 NOTE — TELEPHONE ENCOUNTER
RN has chart notes from August 10 2021- that mention ADHD    It is printed and and at the  for pt to  incase she can not find it in her Mychart    8/10/21

## 2023-03-15 ENCOUNTER — OFFICE VISIT (OUTPATIENT)
Dept: INTERNAL MEDICINE CLINIC | Facility: CLINIC | Age: 58
End: 2023-03-15
Payer: MEDICARE

## 2023-03-15 VITALS
HEIGHT: 67.5 IN | DIASTOLIC BLOOD PRESSURE: 78 MMHG | SYSTOLIC BLOOD PRESSURE: 124 MMHG | WEIGHT: 247 LBS | BODY MASS INDEX: 38.32 KG/M2 | HEART RATE: 78 BPM | RESPIRATION RATE: 16 BRPM

## 2023-03-15 DIAGNOSIS — E88.81 INSULIN RESISTANCE: ICD-10-CM

## 2023-03-15 DIAGNOSIS — E66.01 CLASS 2 SEVERE OBESITY WITH SERIOUS COMORBIDITY AND BODY MASS INDEX (BMI) OF 38.0 TO 38.9 IN ADULT, UNSPECIFIED OBESITY TYPE (HCC): ICD-10-CM

## 2023-03-15 DIAGNOSIS — M15.9 OSTEOARTHRITIS OF MULTIPLE JOINTS, UNSPECIFIED OSTEOARTHRITIS TYPE: ICD-10-CM

## 2023-03-15 DIAGNOSIS — K21.00 GASTROESOPHAGEAL REFLUX DISEASE WITH ESOPHAGITIS WITHOUT HEMORRHAGE: ICD-10-CM

## 2023-03-15 DIAGNOSIS — Z51.81 ENCOUNTER FOR THERAPEUTIC DRUG MONITORING: Primary | ICD-10-CM

## 2023-03-15 PROCEDURE — 99214 OFFICE O/P EST MOD 30 MIN: CPT | Performed by: NURSE PRACTITIONER

## 2023-03-15 RX ORDER — OMEPRAZOLE 40 MG/1
1 CAPSULE, DELAYED RELEASE ORAL
COMMUNITY
Start: 2022-12-30

## 2023-03-15 RX ORDER — DIETHYLPROPION HYDROCHLORIDE 75 MG/1
1 TABLET ORAL EVERY MORNING
Qty: 30 TABLET | Refills: 2 | Status: SHIPPED | OUTPATIENT
Start: 2023-03-15

## 2023-03-27 ENCOUNTER — TELEPHONE (OUTPATIENT)
Dept: INTERNAL MEDICINE CLINIC | Facility: CLINIC | Age: 58
End: 2023-03-27

## 2023-03-27 DIAGNOSIS — E88.81 INSULIN RESISTANCE: Primary | ICD-10-CM

## 2023-03-27 DIAGNOSIS — E66.01 CLASS 2 SEVERE OBESITY WITH SERIOUS COMORBIDITY AND BODY MASS INDEX (BMI) OF 38.0 TO 38.9 IN ADULT, UNSPECIFIED OBESITY TYPE (HCC): ICD-10-CM

## 2023-03-30 ENCOUNTER — OFFICE VISIT (OUTPATIENT)
Dept: INTERNAL MEDICINE CLINIC | Facility: CLINIC | Age: 58
End: 2023-03-30
Payer: MEDICARE

## 2023-03-30 VITALS — BODY MASS INDEX: 38.62 KG/M2 | HEIGHT: 67.5 IN | WEIGHT: 249 LBS

## 2023-03-30 DIAGNOSIS — M17.12 PRIMARY OSTEOARTHRITIS OF LEFT KNEE: ICD-10-CM

## 2023-03-30 DIAGNOSIS — M17.11 PRIMARY OSTEOARTHRITIS OF RIGHT KNEE: ICD-10-CM

## 2023-03-30 DIAGNOSIS — E66.01 CLASS 2 SEVERE OBESITY WITH SERIOUS COMORBIDITY AND BODY MASS INDEX (BMI) OF 38.0 TO 38.9 IN ADULT, UNSPECIFIED OBESITY TYPE (HCC): ICD-10-CM

## 2023-03-30 DIAGNOSIS — F41.9 ANXIETY DISORDER, UNSPECIFIED TYPE: ICD-10-CM

## 2023-03-30 DIAGNOSIS — K21.00 GASTROESOPHAGEAL REFLUX DISEASE WITH ESOPHAGITIS WITHOUT HEMORRHAGE: ICD-10-CM

## 2023-03-30 PROCEDURE — 97802 MEDICAL NUTRITION INDIV IN: CPT

## 2023-03-30 NOTE — PATIENT INSTRUCTIONS
Goals: 1. Keep a food record, My Net Diary, select the macros dashboard. 2.  Strive to consume at least 4-6 meals/snacks per day. Include protein and produce when you eat. Aim for 51-64 grams of protein per day. Try to keep the carbohydrates at 120 grams per day or less. 3.  Practice eating strategies, eat separately from drinking, avoid straws, chew food 20-30 times before swallowing. Make the meals last 30 minutes. 4.  Aim for 64 oz per day of water. (Try  Protein water, adding True Lemon, Crystal Light). 5.  Taper caffeine and carbonation. 6.  Exercise with a goal of 30 minutes per day for exercise (for example,walking). 7.  Strength training 10 minutes 3 days per week.   (7 minute workout)

## 2023-04-19 ENCOUNTER — TELEPHONE (OUTPATIENT)
Dept: PHYSICAL THERAPY | Facility: HOSPITAL | Age: 58
End: 2023-04-19

## 2023-04-25 ENCOUNTER — OFFICE VISIT (OUTPATIENT)
Dept: SURGERY | Facility: CLINIC | Age: 58
End: 2023-04-25
Payer: MEDICARE

## 2023-04-25 VITALS — WEIGHT: 247.31 LBS | HEIGHT: 67.5 IN | BODY MASS INDEX: 38.36 KG/M2

## 2023-04-25 DIAGNOSIS — E88.81 INSULIN RESISTANCE: ICD-10-CM

## 2023-04-25 DIAGNOSIS — E66.01 CLASS 2 SEVERE OBESITY WITH SERIOUS COMORBIDITY AND BODY MASS INDEX (BMI) OF 38.0 TO 38.9 IN ADULT, UNSPECIFIED OBESITY TYPE (HCC): ICD-10-CM

## 2023-04-25 DIAGNOSIS — E28.2 PCOS (POLYCYSTIC OVARIAN SYNDROME): ICD-10-CM

## 2023-04-25 DIAGNOSIS — M17.11 PRIMARY OSTEOARTHRITIS OF RIGHT KNEE: ICD-10-CM

## 2023-04-25 DIAGNOSIS — K21.00 GASTROESOPHAGEAL REFLUX DISEASE WITH ESOPHAGITIS WITHOUT HEMORRHAGE: ICD-10-CM

## 2023-04-25 DIAGNOSIS — M17.12 PRIMARY OSTEOARTHRITIS OF LEFT KNEE: ICD-10-CM

## 2023-04-25 PROCEDURE — 97803 MED NUTRITION INDIV SUBSEQ: CPT

## 2023-04-25 NOTE — PATIENT INSTRUCTIONS
Recommendations/goals:    1. Continue to keep a food record, My Net Diary, select the macros dashboard. 2.  Continue to strive to consume at least 4-6 meals/snacks per day. Include protein and produce when you eat. Aim for 51-64 grams of protein per day. Try to keep the carbohydrates at 120 grams per day or less. 3.  Continue to practice eating strategies, eat separately from drinking, avoid straws, chew food 20-30 times before swallowing. Make the meals last 30 minutes. 4.  Continue to drink at least 64 oz per day of water. (Try  Protein water, adding True Lemon, Crystal Light, diluted juice). 5.  Continue to taper caffeine and carbonation. 6.  Continue to exercise with a goal of 30 minutes per day for exercise (for example,walking). 7.  Add strength training 10 minutes 3 days per week. (7 minute workout). Focus on left arm, trunk and legs, especially left leg. 8.  Do the quizzes on pages 18-24 for review at next visit.

## 2023-05-02 ENCOUNTER — TELEPHONE (OUTPATIENT)
Dept: FAMILY MEDICINE CLINIC | Facility: CLINIC | Age: 58
End: 2023-05-02

## 2023-05-02 NOTE — TELEPHONE ENCOUNTER
Patient states there was a cyst found on recent CT scan through [facility]    GI (Dr. Michele To) recommended and ordered a pelvic ultrasound    She is wanting to know if it would be better to have ultrasound at [facility] facility or should she just get it done through [facility] since the order is in their system    Please adv  Thank you

## 2023-05-04 ENCOUNTER — APPOINTMENT (OUTPATIENT)
Dept: PHYSICAL THERAPY | Facility: HOSPITAL | Age: 58
End: 2023-05-04
Attending: ANESTHESIOLOGY
Payer: MEDICARE

## 2023-05-16 ENCOUNTER — OFFICE VISIT (OUTPATIENT)
Dept: PHYSICAL THERAPY | Facility: HOSPITAL | Age: 58
End: 2023-05-16
Attending: ANESTHESIOLOGY
Payer: MEDICARE

## 2023-05-16 DIAGNOSIS — M47.816 LUMBAR FACET ARTHROPATHY: ICD-10-CM

## 2023-05-16 PROCEDURE — 97110 THERAPEUTIC EXERCISES: CPT

## 2023-05-16 PROCEDURE — 97162 PT EVAL MOD COMPLEX 30 MIN: CPT

## 2023-05-16 PROCEDURE — 97140 MANUAL THERAPY 1/> REGIONS: CPT

## 2023-05-17 ENCOUNTER — TELEPHONE (OUTPATIENT)
Dept: FAMILY MEDICINE CLINIC | Facility: CLINIC | Age: 58
End: 2023-05-17

## 2023-05-17 ENCOUNTER — TELEPHONE (OUTPATIENT)
Dept: PHYSICAL THERAPY | Facility: HOSPITAL | Age: 58
End: 2023-05-17

## 2023-05-17 DIAGNOSIS — Z12.31 ENCOUNTER FOR SCREENING MAMMOGRAM FOR BREAST CANCER: Primary | ICD-10-CM

## 2023-05-18 ENCOUNTER — APPOINTMENT (OUTPATIENT)
Dept: PHYSICAL THERAPY | Facility: HOSPITAL | Age: 58
End: 2023-05-18
Attending: ANESTHESIOLOGY
Payer: MEDICARE

## 2023-05-22 ENCOUNTER — OFFICE VISIT (OUTPATIENT)
Dept: PHYSICAL THERAPY | Facility: HOSPITAL | Age: 58
End: 2023-05-22
Attending: ANESTHESIOLOGY
Payer: MEDICARE

## 2023-05-22 PROCEDURE — 97110 THERAPEUTIC EXERCISES: CPT

## 2023-05-22 PROCEDURE — 97140 MANUAL THERAPY 1/> REGIONS: CPT

## 2023-05-24 ENCOUNTER — OFFICE VISIT (OUTPATIENT)
Dept: PHYSICAL THERAPY | Facility: HOSPITAL | Age: 58
End: 2023-05-24
Attending: ANESTHESIOLOGY
Payer: MEDICARE

## 2023-05-24 PROCEDURE — 97110 THERAPEUTIC EXERCISES: CPT

## 2023-05-24 PROCEDURE — 97140 MANUAL THERAPY 1/> REGIONS: CPT

## 2023-05-26 ENCOUNTER — OFFICE VISIT (OUTPATIENT)
Dept: INTERNAL MEDICINE CLINIC | Facility: CLINIC | Age: 58
End: 2023-05-26
Payer: MEDICARE

## 2023-05-26 VITALS — HEIGHT: 67.3 IN | BODY MASS INDEX: 37.39 KG/M2 | WEIGHT: 241 LBS

## 2023-05-26 DIAGNOSIS — K21.00 GASTROESOPHAGEAL REFLUX DISEASE WITH ESOPHAGITIS WITHOUT HEMORRHAGE: ICD-10-CM

## 2023-05-26 DIAGNOSIS — M17.11 PRIMARY OSTEOARTHRITIS OF RIGHT KNEE: ICD-10-CM

## 2023-05-26 DIAGNOSIS — M17.12 PRIMARY OSTEOARTHRITIS OF LEFT KNEE: ICD-10-CM

## 2023-05-26 DIAGNOSIS — K44.9 PARAESOPHAGEAL HERNIA: ICD-10-CM

## 2023-05-26 NOTE — PATIENT INSTRUCTIONS
Goals: 1. Continue to keep a food record, My Net Diary, select the macros dashboard. 2.  Continue to strive to consume at least 4-6 meals/snacks per day. Include protein and produce when you eat. Aim for 51-64 grams of protein per day. Try to keep the carbohydrates at 120 grams per day or less. Try some alkaline foods, bananas, melon , cauliflower, fennel, nuts. 3.  Continue to practice eating strategies, chew food 20-30 times before swallowing. Make the meals last 30 minutes. 4.  Continue to drink at least 64 oz per day of water. 5.  Continue to taper caffeine and carbonation. 6.  Continue to exercise with a goal of 30 minutes per day for exercise (for example,walking). 7.  Add strength training 10 minutes 3 days per week. (7 minute workout). Focus on left arm, trunk and legs, especially left leg.

## 2023-05-30 ENCOUNTER — APPOINTMENT (OUTPATIENT)
Dept: PHYSICAL THERAPY | Facility: HOSPITAL | Age: 58
End: 2023-05-30
Attending: ANESTHESIOLOGY
Payer: MEDICARE

## 2023-06-01 NOTE — TELEPHONE ENCOUNTER
I have actually recommended a right shoulder joint injection. If she prefers to have the MRI completed first and will place the order.   Thank you Noted If In Chart

## 2023-06-12 ENCOUNTER — OFFICE VISIT (OUTPATIENT)
Dept: PHYSICAL THERAPY | Facility: HOSPITAL | Age: 58
End: 2023-06-12
Attending: ANESTHESIOLOGY
Payer: MEDICARE

## 2023-06-12 PROCEDURE — 97140 MANUAL THERAPY 1/> REGIONS: CPT

## 2023-06-12 PROCEDURE — 97110 THERAPEUTIC EXERCISES: CPT

## 2023-06-19 ENCOUNTER — OFFICE VISIT (OUTPATIENT)
Dept: FAMILY MEDICINE CLINIC | Facility: CLINIC | Age: 58
End: 2023-06-19
Payer: MEDICARE

## 2023-06-19 VITALS
RESPIRATION RATE: 18 BRPM | SYSTOLIC BLOOD PRESSURE: 124 MMHG | BODY MASS INDEX: 37.57 KG/M2 | DIASTOLIC BLOOD PRESSURE: 72 MMHG | OXYGEN SATURATION: 96 % | TEMPERATURE: 98 F | HEART RATE: 80 BPM | WEIGHT: 239.38 LBS | HEIGHT: 67 IN

## 2023-06-19 DIAGNOSIS — E28.2 PCOS (POLYCYSTIC OVARIAN SYNDROME): ICD-10-CM

## 2023-06-19 DIAGNOSIS — N60.19 FIBROCYSTIC BREAST DISEASE (FCBD), UNSPECIFIED LATERALITY: ICD-10-CM

## 2023-06-19 DIAGNOSIS — M51.26 HERNIATED LUMBAR INTERVERTEBRAL DISC: ICD-10-CM

## 2023-06-19 DIAGNOSIS — Z13.6 SCREENING FOR CARDIOVASCULAR CONDITION: ICD-10-CM

## 2023-06-19 DIAGNOSIS — K44.9 PARAESOPHAGEAL HERNIA: ICD-10-CM

## 2023-06-19 DIAGNOSIS — K21.00 GASTROESOPHAGEAL REFLUX DISEASE WITH ESOPHAGITIS WITHOUT HEMORRHAGE: ICD-10-CM

## 2023-06-19 DIAGNOSIS — E01.0 THYROMEGALY: ICD-10-CM

## 2023-06-19 DIAGNOSIS — D12.6 ADENOMATOUS POLYP OF COLON, UNSPECIFIED PART OF COLON: ICD-10-CM

## 2023-06-19 DIAGNOSIS — Z00.00 ENCOUNTER FOR ANNUAL HEALTH EXAMINATION: ICD-10-CM

## 2023-06-19 DIAGNOSIS — F41.1 GENERALIZED ANXIETY DISORDER: ICD-10-CM

## 2023-06-19 DIAGNOSIS — Z00.00 MEDICARE ANNUAL WELLNESS VISIT, SUBSEQUENT: Primary | ICD-10-CM

## 2023-06-19 DIAGNOSIS — Z00.00 ENCOUNTER FOR MEDICARE ANNUAL WELLNESS EXAM: ICD-10-CM

## 2023-06-19 DIAGNOSIS — M79.7 FIBROMYALGIA: ICD-10-CM

## 2023-06-19 LAB
CHOLEST SERPL-MCNC: 189 MG/DL (ref ?–200)
FASTING PATIENT LIPID ANSWER: NO
HDLC SERPL-MCNC: 71 MG/DL (ref 40–59)
LDLC SERPL CALC-MCNC: 106 MG/DL (ref ?–100)
NONHDLC SERPL-MCNC: 118 MG/DL (ref ?–130)
TRIGL SERPL-MCNC: 66 MG/DL (ref 30–149)
TSI SER-ACNC: 1.68 MIU/ML (ref 0.36–3.74)
VLDLC SERPL CALC-MCNC: 11 MG/DL (ref 0–30)

## 2023-06-19 PROCEDURE — 80061 LIPID PANEL: CPT | Performed by: FAMILY MEDICINE

## 2023-06-19 PROCEDURE — 84443 ASSAY THYROID STIM HORMONE: CPT | Performed by: FAMILY MEDICINE

## 2023-06-20 ENCOUNTER — TELEPHONE (OUTPATIENT)
Dept: FAMILY MEDICINE CLINIC | Facility: CLINIC | Age: 58
End: 2023-06-20

## 2023-06-20 NOTE — TELEPHONE ENCOUNTER
----- Message from Shena Benites DO sent at 6/20/2023  8:17 AM CDT -----  Please notify Rocco Fletcher her cholesterol looks very good, as does her thyroid. Keep working on her weight, and follow up on her knee with ortho.

## 2023-06-22 ENCOUNTER — OFFICE VISIT (OUTPATIENT)
Dept: PHYSICAL THERAPY | Facility: HOSPITAL | Age: 58
End: 2023-06-22
Attending: ANESTHESIOLOGY
Payer: MEDICARE

## 2023-06-22 PROCEDURE — 97140 MANUAL THERAPY 1/> REGIONS: CPT

## 2023-06-22 PROCEDURE — 97110 THERAPEUTIC EXERCISES: CPT

## 2023-07-13 ENCOUNTER — OFFICE VISIT (OUTPATIENT)
Dept: INTERNAL MEDICINE CLINIC | Facility: CLINIC | Age: 58
End: 2023-07-13
Payer: MEDICARE

## 2023-07-13 VITALS
HEART RATE: 80 BPM | HEIGHT: 67.5 IN | DIASTOLIC BLOOD PRESSURE: 82 MMHG | SYSTOLIC BLOOD PRESSURE: 126 MMHG | BODY MASS INDEX: 36.76 KG/M2 | RESPIRATION RATE: 16 BRPM | WEIGHT: 237 LBS

## 2023-07-13 DIAGNOSIS — K21.00 GASTROESOPHAGEAL REFLUX DISEASE WITH ESOPHAGITIS WITHOUT HEMORRHAGE: ICD-10-CM

## 2023-07-13 DIAGNOSIS — M15.9 OSTEOARTHRITIS OF MULTIPLE JOINTS, UNSPECIFIED OSTEOARTHRITIS TYPE: ICD-10-CM

## 2023-07-13 DIAGNOSIS — Z51.81 ENCOUNTER FOR THERAPEUTIC DRUG MONITORING: Primary | ICD-10-CM

## 2023-07-13 DIAGNOSIS — E66.01 CLASS 2 SEVERE OBESITY WITH SERIOUS COMORBIDITY AND BODY MASS INDEX (BMI) OF 38.0 TO 38.9 IN ADULT, UNSPECIFIED OBESITY TYPE: ICD-10-CM

## 2023-07-13 PROBLEM — E66.812 CLASS 2 SEVERE OBESITY WITH SERIOUS COMORBIDITY AND BODY MASS INDEX (BMI) OF 38.0 TO 38.9 IN ADULT (HCC): Status: ACTIVE | Noted: 2023-07-13

## 2023-07-13 PROCEDURE — 99213 OFFICE O/P EST LOW 20 MIN: CPT | Performed by: NURSE PRACTITIONER

## 2023-07-13 RX ORDER — DIETHYLPROPION HYDROCHLORIDE 75 MG/1
1 TABLET ORAL EVERY MORNING
Qty: 30 TABLET | Refills: 2 | Status: CANCELLED | OUTPATIENT
Start: 2023-07-13

## 2023-07-13 RX ORDER — PANCRELIPASE LIPASE, PANCRELIPASE PROTEASE, PANCRELIPASE AMYLASE 40000; 126000; 168000 [USP'U]/1; [USP'U]/1; [USP'U]/1
CAPSULE, DELAYED RELEASE ORAL
Qty: 720 CAPSULE | Refills: 1 | Status: SHIPPED | OUTPATIENT
Start: 2023-07-13

## 2023-08-28 ENCOUNTER — OFFICE VISIT (OUTPATIENT)
Dept: INTERNAL MEDICINE CLINIC | Facility: CLINIC | Age: 58
End: 2023-08-28
Payer: MEDICARE

## 2023-08-28 VITALS — HEIGHT: 67.5 IN | BODY MASS INDEX: 36.64 KG/M2 | WEIGHT: 236.19 LBS

## 2023-08-28 DIAGNOSIS — K21.00 GASTROESOPHAGEAL REFLUX DISEASE WITH ESOPHAGITIS WITHOUT HEMORRHAGE: ICD-10-CM

## 2023-08-28 DIAGNOSIS — E28.2 PCOS (POLYCYSTIC OVARIAN SYNDROME): ICD-10-CM

## 2023-08-28 DIAGNOSIS — E66.9 CLASS 2 OBESITY WITH BODY MASS INDEX (BMI) OF 36.0 TO 36.9 IN ADULT, UNSPECIFIED OBESITY TYPE, UNSPECIFIED WHETHER SERIOUS COMORBIDITY PRESENT: Primary | ICD-10-CM

## 2023-08-28 PROCEDURE — 97803 MED NUTRITION INDIV SUBSEQ: CPT

## 2023-08-28 NOTE — PATIENT INSTRUCTIONS
Goals: 1. Continue to keep a food record, My Net Diary, select the macros dashboard. 2.  Add a snack or meal add a fruit or vegetables. Strive for 3 servings per day of fruit and vegetable. Continue to strive to consume at least 4-6 meals/snacks per day. Include protein and produce when you eat. Aim for 51-64 grams (or 20-25% of calories per day) of protein per day. Try to keep the carbohydrates at 120 grams per day or less. Try some alkaline foods on list-bananas, melon , cauliflower, fennel, nuts. (Try edamame spaghetti, Seedtastic thin sliced bread from [employer]). 3.  Continue to practice eating strategies, chew food 20-30 times before swallowing. Make the meals last 30 minutes. 4.  Continue to drink at least 64 oz per day of water. (Use measured cups)  5. Continue to taper caffeine and carbonation. 6.  Continue to exercise with a goal of 30 minutes per day for exercise (for example,walking). 7.  Continue to strength training 10 minutes 3 days per week. (7 minute workout). Focus on left arm, trunk and legs, especially left leg.

## 2023-10-18 ENCOUNTER — OFFICE VISIT (OUTPATIENT)
Dept: INTERNAL MEDICINE CLINIC | Facility: CLINIC | Age: 58
End: 2023-10-18
Payer: MEDICARE

## 2023-10-18 VITALS
HEIGHT: 67.5 IN | RESPIRATION RATE: 18 BRPM | WEIGHT: 229 LBS | DIASTOLIC BLOOD PRESSURE: 80 MMHG | BODY MASS INDEX: 35.52 KG/M2 | SYSTOLIC BLOOD PRESSURE: 122 MMHG | HEART RATE: 86 BPM

## 2023-10-18 DIAGNOSIS — E88.819 INSULIN RESISTANCE: ICD-10-CM

## 2023-10-18 DIAGNOSIS — F90.9 ATTENTION DEFICIT HYPERACTIVITY DISORDER (ADHD), UNSPECIFIED ADHD TYPE: ICD-10-CM

## 2023-10-18 DIAGNOSIS — E66.01 CLASS 2 SEVERE OBESITY WITH SERIOUS COMORBIDITY AND BODY MASS INDEX (BMI) OF 38.0 TO 38.9 IN ADULT, UNSPECIFIED OBESITY TYPE: ICD-10-CM

## 2023-10-18 DIAGNOSIS — F41.9 ANXIETY DISORDER, UNSPECIFIED TYPE: ICD-10-CM

## 2023-10-18 DIAGNOSIS — K21.00 GASTROESOPHAGEAL REFLUX DISEASE WITH ESOPHAGITIS WITHOUT HEMORRHAGE: ICD-10-CM

## 2023-10-18 DIAGNOSIS — Z51.81 ENCOUNTER FOR THERAPEUTIC DRUG MONITORING: Primary | ICD-10-CM

## 2023-10-18 DIAGNOSIS — M15.9 OSTEOARTHRITIS OF MULTIPLE JOINTS, UNSPECIFIED OSTEOARTHRITIS TYPE: ICD-10-CM

## 2023-10-18 PROCEDURE — 99214 OFFICE O/P EST MOD 30 MIN: CPT | Performed by: NURSE PRACTITIONER

## 2023-10-18 RX ORDER — FERROUS SULFATE 325(65) MG
325 TABLET ORAL 3 TIMES DAILY
COMMUNITY
Start: 2023-09-14

## 2023-10-18 RX ORDER — CEPHALEXIN 500 MG/1
500 CAPSULE ORAL 2 TIMES DAILY
COMMUNITY
Start: 2023-10-13 | End: 2023-10-20

## 2023-10-18 RX ORDER — PANCRELIPASE LIPASE, PANCRELIPASE PROTEASE, PANCRELIPASE AMYLASE 40000; 126000; 168000 [USP'U]/1; [USP'U]/1; [USP'U]/1
CAPSULE, DELAYED RELEASE ORAL
Qty: 720 CAPSULE | Refills: 5 | Status: SHIPPED | OUTPATIENT
Start: 2023-10-18

## 2023-10-18 RX ORDER — DIETHYLPROPION HYDROCHLORIDE 75 MG/1
1 TABLET ORAL EVERY MORNING
Qty: 30 TABLET | Refills: 2 | Status: SHIPPED | OUTPATIENT
Start: 2023-10-18

## 2023-10-18 RX ORDER — ACETAMINOPHEN 325 MG/1
975 TABLET ORAL
COMMUNITY
Start: 2023-09-14

## 2023-10-18 RX ORDER — ACESULFAME POTASSIUM 100 %
POWDER (GRAM) MISCELLANEOUS DAILY
COMMUNITY

## 2023-11-09 NOTE — PROGRESS NOTES
Dx: lumbar radiculitis         Authorized # of Visits:           Next MD visit: none scheduled  Fall Risk: standard         Precautions: n/a             Subjective: Patient 7' late for her appointment and late for the 2nd straight time.   Patient states she My [facility] Support: [phone]   Supine bridges x20 Supine bridges x20   Supine bridges x20     L sidelying R lumbar rotation self-mobilization x20    Supine SKTC on R 4x10 sec     kinesiotape star pattern R low back paraspinals L3 kinesiotape star pattern R low back paraspinals L3

## 2023-12-20 ENCOUNTER — OFFICE VISIT (OUTPATIENT)
Dept: FAMILY MEDICINE CLINIC | Facility: CLINIC | Age: 58
End: 2023-12-20
Payer: MEDICARE

## 2023-12-20 VITALS
HEART RATE: 73 BPM | WEIGHT: 231 LBS | TEMPERATURE: 98 F | SYSTOLIC BLOOD PRESSURE: 110 MMHG | BODY MASS INDEX: 36 KG/M2 | RESPIRATION RATE: 16 BRPM | DIASTOLIC BLOOD PRESSURE: 74 MMHG | OXYGEN SATURATION: 96 %

## 2023-12-20 DIAGNOSIS — Z51.81 ENCOUNTER FOR THERAPEUTIC DRUG MONITORING: ICD-10-CM

## 2023-12-20 DIAGNOSIS — E78.5 LIPIDS BLOOD INCREASED: ICD-10-CM

## 2023-12-20 DIAGNOSIS — E66.01 CLASS 2 SEVERE OBESITY WITH SERIOUS COMORBIDITY AND BODY MASS INDEX (BMI) OF 38.0 TO 38.9 IN ADULT, UNSPECIFIED OBESITY TYPE  (HCC): ICD-10-CM

## 2023-12-20 DIAGNOSIS — G25.81 RESTLESS LEGS: ICD-10-CM

## 2023-12-20 DIAGNOSIS — E03.8 TSH (THYROID-STIMULATING HORMONE DEFICIENCY): ICD-10-CM

## 2023-12-20 DIAGNOSIS — D50.0 IRON DEFICIENCY ANEMIA DUE TO CHRONIC BLOOD LOSS: Primary | ICD-10-CM

## 2023-12-20 DIAGNOSIS — K21.00 GASTROESOPHAGEAL REFLUX DISEASE WITH ESOPHAGITIS WITHOUT HEMORRHAGE: ICD-10-CM

## 2023-12-20 DIAGNOSIS — Z51.81 MEDICATION MONITORING ENCOUNTER: ICD-10-CM

## 2023-12-20 DIAGNOSIS — Z00.00 ROUTINE HEALTH MAINTENANCE: ICD-10-CM

## 2023-12-20 LAB
ALBUMIN SERPL-MCNC: 4 G/DL (ref 3.4–5)
ALBUMIN/GLOB SERPL: 1 {RATIO} (ref 1–2)
ALP LIVER SERPL-CCNC: 112 U/L
ALT SERPL-CCNC: 17 U/L
AMYLASE SERPL-CCNC: 80 U/L (ref 25–115)
ANION GAP SERPL CALC-SCNC: 1 MMOL/L (ref 0–18)
AST SERPL-CCNC: 14 U/L (ref 15–37)
BASOPHILS # BLD AUTO: 0.05 X10(3) UL (ref 0–0.2)
BASOPHILS NFR BLD AUTO: 0.6 %
BILIRUB SERPL-MCNC: 0.5 MG/DL (ref 0.1–2)
BUN BLD-MCNC: 14 MG/DL (ref 9–23)
CALCIUM BLD-MCNC: 9.5 MG/DL (ref 8.5–10.1)
CHLORIDE SERPL-SCNC: 107 MMOL/L (ref 98–112)
CO2 SERPL-SCNC: 29 MMOL/L (ref 21–32)
CREAT BLD-MCNC: 0.8 MG/DL
EGFRCR SERPLBLD CKD-EPI 2021: 86 ML/MIN/1.73M2 (ref 60–?)
EOSINOPHIL # BLD AUTO: 0.28 X10(3) UL (ref 0–0.7)
EOSINOPHIL NFR BLD AUTO: 3.5 %
ERYTHROCYTE [DISTWIDTH] IN BLOOD BY AUTOMATED COUNT: 15.1 %
FASTING STATUS PATIENT QL REPORTED: YES
GLOBULIN PLAS-MCNC: 4 G/DL (ref 2.8–4.4)
GLUCOSE BLD-MCNC: 93 MG/DL (ref 70–99)
HCT VFR BLD AUTO: 41.8 %
HGB BLD-MCNC: 13.1 G/DL
IMM GRANULOCYTES # BLD AUTO: 0.02 X10(3) UL (ref 0–1)
IMM GRANULOCYTES NFR BLD: 0.2 %
IRON SATN MFR SERPL: 17 %
IRON SERPL-MCNC: 77 UG/DL
LIPASE SERPL-CCNC: 46 U/L (ref ?–300)
LYMPHOCYTES # BLD AUTO: 3.29 X10(3) UL (ref 1–4)
LYMPHOCYTES NFR BLD AUTO: 40.6 %
MCH RBC QN AUTO: 27 PG (ref 26–34)
MCHC RBC AUTO-ENTMCNC: 31.3 G/DL (ref 31–37)
MCV RBC AUTO: 86 FL
MONOCYTES # BLD AUTO: 0.68 X10(3) UL (ref 0.1–1)
MONOCYTES NFR BLD AUTO: 8.4 %
NEUTROPHILS # BLD AUTO: 3.79 X10 (3) UL (ref 1.5–7.7)
NEUTROPHILS # BLD AUTO: 3.79 X10(3) UL (ref 1.5–7.7)
NEUTROPHILS NFR BLD AUTO: 46.7 %
OSMOLALITY SERPL CALC.SUM OF ELEC: 284 MOSM/KG (ref 275–295)
PLATELET # BLD AUTO: 360 10(3)UL (ref 150–450)
POTASSIUM SERPL-SCNC: 4.4 MMOL/L (ref 3.5–5.1)
PROT SERPL-MCNC: 8 G/DL (ref 6.4–8.2)
RBC # BLD AUTO: 4.86 X10(6)UL
SODIUM SERPL-SCNC: 137 MMOL/L (ref 136–145)
T4 FREE SERPL-MCNC: 1.1 NG/DL (ref 0.8–1.7)
TIBC SERPL-MCNC: 451 UG/DL (ref 240–450)
TRANSFERRIN SERPL-MCNC: 303 MG/DL (ref 200–360)
TSI SER-ACNC: 2.26 MIU/ML (ref 0.36–3.74)
WBC # BLD AUTO: 8.1 X10(3) UL (ref 4–11)

## 2023-12-20 PROCEDURE — 99214 OFFICE O/P EST MOD 30 MIN: CPT | Performed by: FAMILY MEDICINE

## 2023-12-20 PROCEDURE — 82150 ASSAY OF AMYLASE: CPT | Performed by: FAMILY MEDICINE

## 2023-12-20 PROCEDURE — 84443 ASSAY THYROID STIM HORMONE: CPT | Performed by: FAMILY MEDICINE

## 2023-12-20 PROCEDURE — 85025 COMPLETE CBC W/AUTO DIFF WBC: CPT | Performed by: FAMILY MEDICINE

## 2023-12-20 PROCEDURE — 80053 COMPREHEN METABOLIC PANEL: CPT | Performed by: FAMILY MEDICINE

## 2023-12-20 PROCEDURE — 83540 ASSAY OF IRON: CPT | Performed by: FAMILY MEDICINE

## 2023-12-20 PROCEDURE — 83550 IRON BINDING TEST: CPT | Performed by: FAMILY MEDICINE

## 2023-12-20 PROCEDURE — 84439 ASSAY OF FREE THYROXINE: CPT | Performed by: FAMILY MEDICINE

## 2023-12-20 PROCEDURE — 83690 ASSAY OF LIPASE: CPT | Performed by: FAMILY MEDICINE

## 2023-12-20 RX ORDER — OMEPRAZOLE 40 MG/1
40 CAPSULE, DELAYED RELEASE ORAL
Qty: 180 CAPSULE | Refills: 0 | Status: SHIPPED | OUTPATIENT
Start: 2023-12-20 | End: 2024-03-19

## 2023-12-20 RX ORDER — ERGOCALCIFEROL 1.25 MG/1
50000 CAPSULE ORAL WEEKLY
Qty: 12 CAPSULE | Refills: 1 | Status: SHIPPED | OUTPATIENT
Start: 2023-12-20

## 2023-12-20 RX ORDER — PANCRELIPASE LIPASE, PANCRELIPASE PROTEASE, PANCRELIPASE AMYLASE 40000; 126000; 168000 [USP'U]/1; [USP'U]/1; [USP'U]/1
CAPSULE, DELAYED RELEASE ORAL
Qty: 540 CAPSULE | Refills: 2 | Status: SHIPPED | OUTPATIENT
Start: 2023-12-20

## 2023-12-20 RX ORDER — DIETHYLPROPION HYDROCHLORIDE 75 MG/1
1 TABLET ORAL EVERY MORNING
Qty: 90 TABLET | Refills: 0 | Status: SHIPPED | OUTPATIENT
Start: 2023-12-20

## 2023-12-21 ENCOUNTER — TELEPHONE (OUTPATIENT)
Dept: FAMILY MEDICINE CLINIC | Facility: CLINIC | Age: 58
End: 2023-12-21

## 2023-12-21 NOTE — TELEPHONE ENCOUNTER
Yani Harrison, [facility] Nurse  So her pancreatic enzymes look good, her blood count was normal but her iron, WHILE NORMAL , was at the lower end of normal., and I think it might  be contributing to the creepy crawling sensation she has going on.  She would benefit from an MVI with iron

## 2023-12-26 ENCOUNTER — HOSPITAL ENCOUNTER (OUTPATIENT)
Dept: MAMMOGRAPHY | Age: 58
Discharge: HOME | End: 2023-12-26
Attending: FAMILY MEDICINE
Payer: MEDICARE

## 2023-12-26 DIAGNOSIS — Z12.31 ENCOUNTER FOR SCREENING MAMMOGRAM FOR BREAST CANCER: ICD-10-CM

## 2023-12-26 PROCEDURE — 77063 BREAST TOMOSYNTHESIS BI: CPT | Performed by: FAMILY MEDICINE

## 2023-12-26 PROCEDURE — 77067 SCR MAMMO BI INCL CAD: CPT | Performed by: FAMILY MEDICINE

## 2024-03-15 DIAGNOSIS — D50.0 IRON DEFICIENCY ANEMIA DUE TO CHRONIC BLOOD LOSS: ICD-10-CM

## 2024-03-15 DIAGNOSIS — G25.81 RESTLESS LEGS: ICD-10-CM

## 2024-03-15 DIAGNOSIS — Z00.00 ROUTINE HEALTH MAINTENANCE: ICD-10-CM

## 2024-03-15 NOTE — TELEPHONE ENCOUNTER
PATIENT SAYS SHE CAN NO LONGER SEE HER DIETICIAN DUE TO INSURANCE NOT COVERING ANYMORE.  PATIENT REQUESTING REFILL FOR   OMEPRAZOLE 40MG BID #60  PANCRELIPASE, LIP-PROT-AMYL [phone] 10 DAILY #300      /pharmacy #[id number] - [place], [place] - [address] [phone], [phone]   [address] [address]   Phone: [phone] Fax: [phone]

## 2024-03-18 ENCOUNTER — HOSPITAL ENCOUNTER (OUTPATIENT)
Dept: ULTRASOUND IMAGING | Age: 59
Discharge: HOME | End: 2024-03-18
Attending: FAMILY MEDICINE
Payer: MEDICARE

## 2024-03-18 ENCOUNTER — HOSPITAL ENCOUNTER (OUTPATIENT)
Dept: MAMMOGRAPHY | Age: 59
Discharge: HOME | End: 2024-03-18
Attending: FAMILY MEDICINE
Payer: MEDICARE

## 2024-03-18 DIAGNOSIS — R92.2 INCONCLUSIVE MAMMOGRAM: ICD-10-CM

## 2024-03-18 DIAGNOSIS — N63.20 MASS OF LEFT BREAST, UNSPECIFIED QUADRANT: Primary | ICD-10-CM

## 2024-03-18 PROCEDURE — 77061 BREAST TOMOSYNTHESIS UNI: CPT | Performed by: FAMILY MEDICINE

## 2024-03-18 PROCEDURE — 76642 ULTRASOUND BREAST LIMITED: CPT | Performed by: FAMILY MEDICINE

## 2024-03-18 PROCEDURE — 77065 DX MAMMO INCL CAD UNI: CPT | Performed by: FAMILY MEDICINE

## 2024-03-18 RX ORDER — OMEPRAZOLE 40 MG/1
40 CAPSULE, DELAYED RELEASE ORAL
Qty: 180 CAPSULE | Refills: 1 | Status: SHIPPED | OUTPATIENT
Start: 2024-03-18 | End: 2024-06-16

## 2024-03-18 NOTE — IMAGING NOTE
This Breast Care RN assisted Dr. Flores with recommendation for a left breast 1 site ultrasound guided biopsy for mass. Procedure reviewed and all questions answered. Emotional and educational support given. On the day of the biopsy, pt instructed to take Tylenol 1000mg PO, eat a light meal & bring or wear a sports bra. Post biopsy care also reviewed with pt to include NO lifting more than 5lbs, no exercising or housework (limit upper body movement) for 24-48 hrs post biopsy. Patient denies blood thinners, bleeding disorders, liver disease and chemo. Pt verbalized understanding. Patient provided with appointment on Wednesday 3-20-24 at 0830 arrival at 0800 pending signed order, verified with schedulers.

## 2024-03-20 ENCOUNTER — HOSPITAL ENCOUNTER (OUTPATIENT)
Dept: MAMMOGRAPHY | Facility: HOSPITAL | Age: 59
Discharge: HOME | End: 2024-03-20
Attending: FAMILY MEDICINE
Payer: MEDICARE

## 2024-03-20 DIAGNOSIS — N63.0 BREAST MASS: ICD-10-CM

## 2024-03-20 DIAGNOSIS — N63.21 MASS OF UPPER OUTER QUADRANT OF LEFT BREAST: ICD-10-CM

## 2024-03-20 PROCEDURE — 19083 BX BREAST 1ST LESION US IMAG: CPT | Performed by: FAMILY MEDICINE

## 2024-03-20 PROCEDURE — 88305 TISSUE EXAM BY PATHOLOGIST: CPT | Performed by: FAMILY MEDICINE

## 2024-03-20 PROCEDURE — 77065 DX MAMMO INCL CAD UNI: CPT | Performed by: FAMILY MEDICINE

## 2024-03-20 PROCEDURE — 88360 TUMOR IMMUNOHISTOCHEM/MANUAL: CPT | Performed by: FAMILY MEDICINE

## 2024-03-20 PROCEDURE — 88377 M/PHMTRC ALYS ISHQUANT/SEMIQ: CPT | Performed by: FAMILY MEDICINE

## 2024-03-21 ENCOUNTER — TELEPHONE (OUTPATIENT)
Dept: FAMILY MEDICINE CLINIC | Facility: CLINIC | Age: 59
End: 2024-03-21

## 2024-03-21 DIAGNOSIS — C50.912 INVASIVE DUCTAL CARCINOMA OF BREAST, STAGE 1, LEFT (HCC): ICD-10-CM

## 2024-03-21 DIAGNOSIS — D05.11 DUCTAL CARCINOMA IN SITU (DCIS) OF RIGHT BREAST: Primary | ICD-10-CM

## 2024-03-21 NOTE — IMAGING NOTE
1412: Spoke with medical assistant Mellissa in Dr. Sang Catalan's office.  Pathology results for Sienna Fontenot's left breast biopsy performed Wednesday, March 20 reported as follows:   Final Diagnosis:   Ultrasound-guided core biopsy, left breast, 11 o'clock position, mass:  -Invasive ductal carcinoma.   -Ductal carcinoma in situ (DCIS).   See EMR for complete pathology report        Dr. Sang Catalan shared that he prefers to provide pathology results as above to Siennaekaterina Fontenot.    Dr. Catalan referring to Dr. Marx.    Informed CAITLIN Malloy and Dr. Catalan that this breast nurse coordinator would follow up with Sienna Fontenot Friday, March 22.  Appointment with Dr. Marx requested for Sienna Fontenot- breast nurse navigators.

## 2024-03-22 ENCOUNTER — TELEPHONE (OUTPATIENT)
Dept: FAMILY MEDICINE CLINIC | Facility: CLINIC | Age: 59
End: 2024-03-22

## 2024-03-22 ENCOUNTER — TELEPHONE (OUTPATIENT)
Dept: SURGERY | Facility: CLINIC | Age: 59
End: 2024-03-22

## 2024-03-22 NOTE — TELEPHONE ENCOUNTER
the needs to be seen sooner , did she tell them she has breast cancer?     Called Corazon back and gave Dr. Catalan's above information. Since she needs to be seen sooner, advised to schedule with Dr. Claros.

## 2024-03-22 NOTE — TELEPHONE ENCOUNTER
SARA IS CALLING FROM [facility]. SHE ASKS TO SPEAK TO DR JEAN BAPTISTE NURSE. I INFORMED HER THAT NURSE NOT IN AND DR JEAN BAPTISTE NOT IN TODAY. SHE SAYS THIS CALL IS REGARDING THE REFERRAL. SHE SAYS TO FIND SOMEONE ELSE BECAUSE DR HERRING NEXT AVAILABILITY IS END OF APRIL AND TOO FAR OUT.  HER DIRECT NUMBER [phone]

## 2024-03-22 NOTE — IMAGING NOTE
1352: Spoke briefly with DAVI Zamudio in Dr. Catalan's office.  Introduced myself as one of the breast nurse coordinators at [facility] and informed Lizz of the purpose of my call- Sienna Fontenot's surgical referral to Dr. Jose Raul Marx.  Informed DAVI Zamudio that per the breast nurse navigators Dr. Marx and Dr. Burleson's first availability is the end of April.  Nurse navigators offered earlier appointment with Dr. Renny Claros if Dr. Catalan in agreement.  DAVI Zamudio shared that Dr. Catalan was currently not in the office; Lizz to forward the above message and follow up with the breast center regarding Dr. Catalan's surgical preference.

## 2024-03-22 NOTE — IMAGING NOTE
0822: Spoke with Sienna Fontenot post ultrasound guided left breast biopsy.  Introduced myself as one of the breast  nurse coordinators at [facility] and informed Sienna Po of the purpose of my call.  Name and date of birth verified by patient.    Reinforced post biopsy care and instruction.  Ms. Fontenot denies any issues with biopsy site- bleeding, drainage, redness, tenderness.     Sienna Fontenot provided with breast pathology results by Dr. Sang Catalan Thursday, March 21.     Pathology results and recommendations reviewed as follows: positive for malignancy  Final Diagnosis:   Ultrasound-guided core biopsy, left breast, 11 o'clock position, mass:  -Invasive ductal carcinoma.   -Ductal carcinoma in situ (DCIS).         See EMR for complete pathology report    Dr. Sang Catalan referring to Dr. Jose Raul Marx.  Informed Siennaekaterina Fontenot that a nurse from the breast center would follow up to provide an appointment with Dr. Marx.  Phone numbers provided for Dr. Marx, the breast nurse navigators, and myself.  Encouraged Sienna Fontenot to contact the breast center or Dr. Catalan with questions or concerns.     Sienna Fontenot verbalized understanding and agreement to the above.

## 2024-03-22 NOTE — TELEPHONE ENCOUNTER
Spoke to patient regarding referral to Dr. Claros. Patient voiced concerns of appointment not available with Dr. Marx and wants to make sure it is not do to her having public insurance. Notified and assured patient that Dr. Marx has no availability until the end of April and her PCP referred her to Dr. Claros. Patient agreeable and scheduled for new consult with Dr. Claros at 12:30 pm at [facility] on 3/29. Notified patient that message regarding appointment sent to breast navigators at [facility] and breast navigators will be in contact. All questions answered, advised to call back for any future questions or concerns. Patient verbalized understanding.

## 2024-03-22 NOTE — TELEPHONE ENCOUNTER
Corazon from breast scheduling calling back.    Wanted to let DS know Dr Marx, Dr Burleson and Dr Potts not available until end of April    Corazon asking if it is OK to wait until or does DS want patient seen sooner    States Dr Renny Claros has appointments in the next few weeks        Corazon aware DS out of office and will call back Monday

## 2024-03-25 ENCOUNTER — TELEPHONE (OUTPATIENT)
Dept: FAMILY MEDICINE CLINIC | Facility: CLINIC | Age: 59
End: 2024-03-25

## 2024-03-25 ENCOUNTER — TELEPHONE (OUTPATIENT)
Dept: HEMATOLOGY/ONCOLOGY | Facility: HOSPITAL | Age: 59
End: 2024-03-25

## 2024-03-25 NOTE — TELEPHONE ENCOUNTER
Pt is seeing the surgical oncologist for breast cancer on Friday, she has questions before she goes

## 2024-03-25 NOTE — TELEPHONE ENCOUNTER
Pt states that     She is wanting know what is better for her CA- if she should do a lumpectomy vs mastectomy?    She wants to know if Dr. Catalan would know what should better for her situation.    She states she doesn't need her \"jugs\" so if it is best to take them off- then she does not need them. She does not want to play with her health.    Patient is nervous and full of questions    IF she gets a mastectomy she can not get it done at [facility] since she has no support    If she needs a full mastectomy then she needs to have it at [facility] because that is where she has family    Patient is very upset and sounds verty scared- she needs some information on why there are mutliple options for treatment

## 2024-03-25 NOTE — TELEPHONE ENCOUNTER
Is concerned about her treatment plan, explained that she needs to sit down with the panel and have her entire case films, genetics, pathology and see what all of her options  are.

## 2024-03-25 NOTE — TELEPHONE ENCOUNTER
Phoned patient and we discussed newly diagnosed breast cancer. Introduced myself as one of the breast nurse navigators and explained the role of the breast nurse navigator. Explained the role of the physicians on her breast cancer care team. Briefly discussed breast cancer treatments including surgery, radiation, hormone therapy, and chemotherapy. Patient has appointment with Dr. Claros on Friday March 29, 2024 in [place]. Patient declined genetics appointment stating she does not want to pay out of pocket due to having Medicare insurance. Patient states she is inclined to having mastectomy and will [facility] since family lives there but is open to lumpectomy at [facility], if recommended. Explained the breast cancer multidisciplinary meeting and that her case will be discussed. Patient given my contact information to call with any further questions or concerns.

## 2024-03-29 ENCOUNTER — NURSE NAVIGATOR ENCOUNTER (OUTPATIENT)
Dept: HEMATOLOGY/ONCOLOGY | Facility: HOSPITAL | Age: 59
End: 2024-03-29

## 2024-03-29 ENCOUNTER — OFFICE VISIT (OUTPATIENT)
Dept: SURGERY | Facility: CLINIC | Age: 59
End: 2024-03-29
Payer: MEDICARE

## 2024-03-29 VITALS
BODY MASS INDEX: 36.3 KG/M2 | SYSTOLIC BLOOD PRESSURE: 123 MMHG | WEIGHT: 234 LBS | DIASTOLIC BLOOD PRESSURE: 81 MMHG | HEART RATE: 76 BPM | RESPIRATION RATE: 18 BRPM | HEIGHT: 67.5 IN | OXYGEN SATURATION: 100 %

## 2024-03-29 DIAGNOSIS — C50.912 MALIGNANT NEOPLASM OF LEFT BREAST IN FEMALE, ESTROGEN RECEPTOR POSITIVE, UNSPECIFIED SITE OF BREAST (HCC): Primary | ICD-10-CM

## 2024-03-29 DIAGNOSIS — Z17.0 MALIGNANT NEOPLASM OF LEFT BREAST IN FEMALE, ESTROGEN RECEPTOR POSITIVE, UNSPECIFIED SITE OF BREAST (HCC): Primary | ICD-10-CM

## 2024-03-29 PROCEDURE — 99205 OFFICE O/P NEW HI 60 MIN: CPT | Performed by: SURGERY

## 2024-03-29 NOTE — PROGRESS NOTES
Briefly met with patient and provided breast cancer treatment handbook, social workers sheet, breast cancer journey map, medical oncologist sheet, breast navigators contact cards, lumpectomy sheet, pre op mastectomy class sheet, breast cancer support information, and patient and her family member stated they wanted to process the information that they received during their consultation with Dr. Claros and did not want to schedule a medical oncologist or genetics appointment but will contact the breast nurse navigators next week. She requested a plastics consultation appointment and I stated I would have the team contact her to schedule an appointment. I stated if I don't hear from her next week, I will call her. Dr. Claros referred patient to Dr. Azevedo.

## 2024-04-02 NOTE — CONSULTS
[facility]    Patient Name:  Sienna Fontenot   YOB: 1965   Gender:  Female   Appt Date:  3/29/2024   Provider:  Renny Claros MD   Insurance:  MEDICARE PART B ONLY     PATIENT PROVIDERS  Referring Provider: No ref. provider found   Address: No referring provider defined for this encounter.   Phone #: N/A    Primary Care Provider:Sang Catalan DO   Address: [address] [address]   Phone #: [phone]       CHIEF COMPLAINT  Chief Complaint   Patient presents with    Consult        PROBLEMS  Reviewed   Patient Active Problem List   Diagnosis    Thyromegaly    PCOS (polycystic ovarian syndrome)    Fibromyalgia    Herniated lumbar intervertebral disc    Gastroesophageal reflux disease with esophagitis without hemorrhage    Anxiety disorder    Paraesophageal hernia    Adenomatous polyp of colon    Fibrocystic breast disease    BMI 37.0-37.9, adult    Class 2 severe obesity with serious comorbidity and body mass index (BMI) of 38.0 to 38.9 in adult ([facility])    Encounter for therapeutic drug monitoring        History of Present Illness:  I am seeing Ms. Sienna Fontenot today in surgical oncology clinic to render opinion guarding surgical management of new diagnosis of left breast invasive ductal carcinoma [very pleasant 58-year-old female who is being evaluated today for the above reason.Patient underwent screening mammogram on 12/26/2023 which was designated BI-RADS Category 0 as there was an indeterminate mass in the upper central left breast.  On 3/18/2024, patient underwent a diagnostic mammogram which was designated a BI-RADS Category 5 as it revealed a hypoechoic mass at the 11 o'clock position 7 cm from the nipple measuring 6 x 7 x 5 mm.  Ultrasound-guided biopsy was obtained.  Pathology is reviewed below but essentially returned to show invasive ductal carcinoma grade 2 in the background of ductal carcinoma in situ with  nuclear grade 2 cribriform type, ER positive GA positive HER2 positive [equivocal on IHC, FISH ratio 8.4/amplified].  Patient here to discuss options.     Vital Signs:  /81 (BP Location: Left arm, Patient Position: Sitting, Cuff Size: adult)   Pulse 76   Resp 18   Ht 1.715 m (5' 7.5\")   Wt 106.1 kg (234 lb)   SpO2 100%   BMI 36.11 kg/m²      Medications Reviewed:    Current Outpatient Medications:     Omeprazole 40 MG Oral Capsule Delayed Release, Take 1 capsule (40 mg total) by mouth 2 (two) times daily before meals., Disp: 180 capsule, Rfl: 1    Pancrelipase, Lip-Prot-Amyl, (ZENPEP) 12575-405567 units Oral Cap DR Particles, Take 2 capsules by mouth 3 (three) times daily with meals. May also take 1 capsule 2 (two) times daily as needed (snack)., Disp: 540 capsule, Rfl: 2    ergocalciferol 1.25 MG (30933 UT) Oral Cap, Take 1 capsule (50,000 Units total) by mouth once a week., Disp: 12 capsule, Rfl: 1    Diethylpropion HCl ER 75 MG Oral Tablet 24 Hr, Take 1 tablet (75 mg total) by mouth every morning., Disp: 90 tablet, Rfl: 0     Allergies Reviewed:  Allergies   Allergen Reactions    Terbutaline      Swelling/tachycardia-given to pt for premature labor        History:  Reviewed:  Past Medical History:   Diagnosis Date    Disorder of thyroid     Fibrocystic breast disease     Fibromyalgia     Obesity, unspecified     PCOS (polycystic ovarian syndrome)     Problems with swallowing     Visual impairment     glasses      Reviewed:  Past Surgical History:   Procedure Laterality Date    COLONOSCOPY N/A 02/24/2022    Procedure: COLONOSCOPY;  Surgeon: Justus Taylor MD;  Location: [facility] ENDOSCOPY    CYST ASPIRATION RIGHT      FRACTURE SURGERY      GREGORIA LOCALIZATION WIRE 1 SITE LEFT (CPT=19281)  1979    BENIGN    NEEDLE BIOPSY LEFT  02/2010    BENIGN    OOPHORECTOMY Bilateral 09/11/2023    [facility]    OTHER      left hand, injury    OTHER Right 09/10/2019    Right femur rodding     OTHER SURGICAL HISTORY Right  2018    arthroscopy with partial medial mensicectomy    REPAIR ROTATOR CUFF,ACUTE Left     TOTAL ABDOMINAL HYSTERECTOMY  2023    [facility]    TUBAL LIGATION        Reviewed Social History:  Social History     Socioeconomic History    Marital status:    Tobacco Use    Smoking status: Former     Packs/day: 0.10     Years: 3.00     Additional pack years: 0.00     Total pack years: 0.30     Types: Cigarettes     Quit date: 1993     Years since quittin.9    Smokeless tobacco: Never   Vaping Use    Vaping Use: Never used   Substance and Sexual Activity    Alcohol use: Yes     Alcohol/week: 0.0 - 2.0 standard drinks of alcohol     Comment: social    Drug use: Never     Comment: rare   Other Topics Concern    Caffeine Concern Yes     Comment: 1-2 cups coffee daily    Exercise Yes     Comment: 4-5 x weekly      Reviewed:  Family History   Problem Relation Age of Onset    Lipids Mother     Obesity Mother     Heart Disorder Father     Hypertension Father     Breast Cancer Sister 47    Other (sudden death) Brother     Breast Cancer Paternal Aunt 55    Breast Cancer Paternal Aunt 60        Review of Systems:  Review of Systems   Constitutional:  Negative for activity change, appetite change, chills, fatigue, fever and unexpected weight change.   HENT:  Negative for congestion and trouble swallowing.    Eyes:  Negative for discharge and redness.   Respiratory:  Negative for cough, chest tightness and shortness of breath.    Cardiovascular:  Negative for chest pain and leg swelling.   Gastrointestinal:  Negative for abdominal distention, abdominal pain and nausea.   Endocrine: Negative for polydipsia and polyuria.   Genitourinary:  Negative for difficulty urinating and dysuria.   Musculoskeletal:  Negative for myalgias.   Skin:  Negative for color change and pallor.   Allergic/Immunologic: Negative for immunocompromised state.   Neurological:  Negative for syncope and weakness.   Hematological:   Does not bruise/bleed easily.   Psychiatric/Behavioral:  Negative for agitation and confusion.         Physical Examination:  Physical Exam  Constitutional:       Appearance: She is well-developed.   HENT:      Head: Normocephalic.   Eyes:      Pupils: Pupils are equal, round, and reactive to light.   Cardiovascular:      Rate and Rhythm: Normal rate and regular rhythm.      Heart sounds: No murmur heard.  Pulmonary:      Effort: Pulmonary effort is normal. No respiratory distress.      Breath sounds: Normal breath sounds. No wheezing or rales.   Abdominal:      General: There is no distension.      Palpations: Abdomen is soft.      Tenderness: There is no abdominal tenderness.   Musculoskeletal:      Cervical back: Normal range of motion.   Skin:     General: Skin is warm and dry.   Neurological:      Mental Status: She is alert and oriented to person, place, and time.        Component  Ref Range & Units      Case Report     Surgical Pathology                                Case: [id number]                                     Authorizing Provider:  Sang Catalan DO          Collected:           03/20/2024 08:44 AM             Ordering Location:     [facility]            Received:            03/20/2024 12:48 PM                                    Mammography                                                                     Pathologist:           Jem Sanchze MD                                                               Specimen:    Breast, left, Breast, left mass, 11 oclock                                                    Addendum 2     Fluorescence In Situ Hybridization:     Material:  Block A1   Population:  Tumor Cells     Probe Result HER2/CEP17 Ratio   HER2-JOSE FISH Amplified (Positive) 8.4      Fixative:.............................  10% Neutral Buffered Formalin  # of observers...................   1  # of cells examined............  20  Average HER-2/nucleus....  20.2  Average CEP17/nucleus....   2.4  Probe Lot #...............................[id number]  Controls Utilized:  ...................Amplified, Equivocal and Not Amplified         Interpreted by:  Jem Sanchez M.D.     Reference Ranges: Not Amplified (Negative) Group 2 AND IHC 0-1 or 2+  HER2/CEP17 ratio ? 2.0 with an average HER2 copy number <4.0 signals/cell  Group 3 AND IHC 0-1+  HER2/CEP17 ratio < 2.0 with an average HER2 copy number ? 6.0 signals/cell   Group 4 and IHC 0-1 or 2+  HER2/CEP 17 ratio < 2.0 with an average HER2 copy number ? 4.0 and < 6.0 signals/cell  Group 5  HER2/CEP17 ratio < 2.0 with an average  HER2 copy number < 4.0 signals/cell     Amplified (Positive) Group 1  HER2/CEP17 ratio ? 2.0 with an average HER2 copy number ? 4.0 signals/cell  Group 2 AND IHC 3+  HER2/CEP17 ratio ? 2.0 with an average HER2 copy number <4.0 signals/cell   Group 3 AND IHC 2+ or 3+  HER2/CEP17 ratio < 2.0 with an average HER2 copy number ? 6.0 signals/cell   Group 4 and IHC 3+  HER2/CEP 17 ratio < 2.0 with an average HER2 copy number ? 4.0 and < 6.0 signals/cell           Methodology: HER-2 gene amplification was assessed utilizing the PathRedfin Networkysion® HER-2 DNA Probe Kit ([employer]).  The identification probes for the HER-2 (SpectrumOrange) and alpha satellite DNA sequence at the centromeric region of chromosome 17 (SpectrumGreen) were hybridized according to the ’s guidelines.  At least 20 non-overlapping nuclei containing at least one orange and one green signal were enumerated.  The ratio of orange signals(HER-2 gene) to green signals (chromosome 17) was calculated.  Validation, performance, reporting,  and proficiency testing regarding the assay is in compliance with the published ASCO-CAP Guidelines (Sheila VALVERDE, et al. Arch Path Lab Med 131:13-43,2007) and the subsequent 2013 Practice Guideline Update (Sheila VALVERDE et al. J Clin Oncol 31(31):7162-9399,2013) J Clin Oncol 10;36(20):7727-5874,2018)..      Intended  Use: HER-2 gene amplification was assessed utilizing the PathVysion® HER-2 DNA Probe Kit ([employer]).  The identification probes for the HER-2 (SpectrumOrange) and alpha satellite DNA sequence at the centromeric region of chromosome 17 (SpectrumGreen) were hybridized according to the ’s guidelines.  At least 20 non-overlapping nuclei containing at least one orange and one green signal were enumerated.  The ratio of orange signals(HER-2 gene) to green signals (chromosome 17) was calculated.  Validation, performance, reporting,  and proficiency testing regarding the assay is in compliance with the published ASCO-CAP Guidelines (Sheila VALVERDE, et al. Arch Path Lab Med 131:13-43,2007; Sheila VALVERDE, et al. J Clin Oncol 31(31):0873-4842,2013; J Clin Oncol 10;36(20):9672-5535,2018).         [place] FDA approved in vitro diagnostic reagents were used for this assay.     Addendum electronically signed by Jem Sanchez MD on 3/28/2024 at  1:35 PM     Addendum 1     Quantitative Immunohistochemistry:   Material:  Block A1  Population:   Tumor Cells     Antibody & Clone             Result Interpretation   Estrogen Receptor   -   SP1 100 % Positive Cells Strong Positive   Progesterone Receptor   -   16 <1 % Positive Cells Negative   KI-67   -   MM1 15 % Positive Cells Intermediate   Her2   -   CB11 2+ Equivocal **      **HER-2/chandler by IHC is 2+/equivocal and will be reflexed to FISH test with results to follow.        Guidelines for HER2 IHC  3+:  based on circumferential membrane staining that is complete, intense, and in  >10% of tumor cells.  2+:  based on weak to moderate complete membrane staining observed in >10% of tumor cells.  1+:  as defined by incomplete membrane staining that is faint/barely perceptible and within > 10% of the tumor cells  0:  as defined by no staining observed or membrane staining that is incomplete and is faint/barely perceptible and within ? 10% of the invasive  tumor cells     Internal control is appropriately positive      Interpreted by:  Page Anand MD     Note:          The quantitation of the Estrogen and Progesterone receptors and Ki-67 is performed by a pathologist via microscopic analysis.  Because of formalin fixation and paraffin embedding of the tissue, the actual levels of estrogen and progesterone receptor may be higher than is detected in this case.     Methodology:         Immunohistochemical stains are performed on formalin-fixed, paraffin-embedded tissue sections.  Deparaffinization, antigen retrieval, and staining utilizes the automated Leica Hannah III immunohistochemistry platform.  A proprietary, non-biotin, polymer-based detection system (Bond Polymer Refine DetectionTM ) is employed.  All antibodies are validated by [facility] Department of Pathology to document appropriate staining reactions.  Positive controls are utilized and show appropriate reactivity.  Validation, performance, reporting,  and proficiency testing regarding the assay is in compliance with the published ASCO-CAP Guidelines (2018).        Addendum electronically signed by Page Rosas MD on 3/25/2024 at 10:10 AM     Final Diagnosis:     Ultrasound-guided core biopsy, left breast, 11 o'clock position, mass:  -Invasive ductal carcinoma.        -Grade 2 (Tubules: 2, Nuclei: 2, Mitoses: 2).        -Largest focus of tumor measures 7 mm (0.7 cm); present in 60% of submitted tissue.   -Ductal carcinoma in situ (DCIS).        -Nuclear grade 2, cribriform type.        Electronically signed by Megan Thorne MD on 3/21/2024 at  1:47 PM        Final Diagnosis Comment      Breast receptor studies will be performed on block A1 with results to follow.     Case also seen by Dr. Page Anand, who concurs.             Clinical Information      Breast Mass, Mass Of Upper Outer Quadrant Of Left Breast            Gross Description       A. Labeled with the patient's name, MRN, and \"breast, left mass, 11:00\"  Received in formalin: Multiple soft, yellow-tan tissue fragments measuring 2.6 x 2.2 x 0.3 cm in aggregate.  The specimen is inked yellow and submitted entirely in A1.     Time tissue excised: 8:44 AM 3/20/2024  Ischemic time: 1 minute  Formalin time: Greater than 6 but less than 72 hours     (MASOOD)     Interpretation     Malignant Abnormal        Assessment / Plan:  Invasive ductal carcinoma, left breast, T1 N0, ER positive NC positive HER2 positive  Case reviewed in tumor board  Recommend upfront surgery.  Discussed options for managing primary tumor and axilla.  Went over breast conserving therapy which she certainly would be a candidate for to include lumpectomy and radiation, discussed role of sentinel lymph node biopsy.  Also went over mastectomy options including a conversation about a contralateral prophylactic mastectomy given her family history.  At this point she would like to think about her options.  She does have an appointment with genetics, I will follow-up with her as she may seek a second opinion.  All questions answered    Renny Claros MD  [facility]  [facility]  [email or web address]        Follow Up:  No follow-ups on file.       Electronically Signed by: Renny Claros MD

## 2024-04-03 ENCOUNTER — TELEPHONE (OUTPATIENT)
Dept: HEMATOLOGY/ONCOLOGY | Facility: HOSPITAL | Age: 59
End: 2024-04-03

## 2024-04-03 NOTE — TELEPHONE ENCOUNTER
Called patient to follow up on scheduling with medical oncology, she is HER2+.  She has decided to go to another facility for both surgery and medical oncology. She states she will make those appointments for herself.  Encouraged patient to call back if she wishes to resume care at [facility].  She agrees to call back if needed.

## 2024-04-04 ENCOUNTER — MED REC SCAN ONLY (OUTPATIENT)
Dept: FAMILY MEDICINE CLINIC | Facility: CLINIC | Age: 59
End: 2024-04-04

## 2024-04-25 ENCOUNTER — TELEPHONE (OUTPATIENT)
Dept: FAMILY MEDICINE CLINIC | Facility: CLINIC | Age: 59
End: 2024-04-25

## 2024-04-25 NOTE — TELEPHONE ENCOUNTER
Have you received orders  
NO  
Paige Barkley, DO   [address] [address]   Phone: [phone]   Fax: [phone]     Called and spoke with Sabiha at [facility] and requested orders faxed to our office  Office fax provided  
Patient called and scheduled a pre-op appointment for May 30th at 9:00AM.     Patient is scheduled to have a bilateral mastectomy at [facility] on June 12th by Dr. Barkley.   
Labor

## 2024-05-30 ENCOUNTER — OFFICE VISIT (OUTPATIENT)
Dept: FAMILY MEDICINE CLINIC | Facility: CLINIC | Age: 59
End: 2024-05-30

## 2024-05-30 VITALS
BODY MASS INDEX: 37 KG/M2 | SYSTOLIC BLOOD PRESSURE: 112 MMHG | WEIGHT: 238.38 LBS | HEART RATE: 75 BPM | RESPIRATION RATE: 16 BRPM | OXYGEN SATURATION: 98 % | DIASTOLIC BLOOD PRESSURE: 78 MMHG | TEMPERATURE: 97 F

## 2024-05-30 DIAGNOSIS — C50.912 INVASIVE DUCTAL CARCINOMA OF LEFT BREAST, STAGE 2 (HCC): ICD-10-CM

## 2024-05-30 DIAGNOSIS — Z01.818 PREOP EXAMINATION: Primary | ICD-10-CM

## 2024-05-30 PROCEDURE — 93000 ELECTROCARDIOGRAM COMPLETE: CPT | Performed by: FAMILY MEDICINE

## 2024-05-30 PROCEDURE — 99214 OFFICE O/P EST MOD 30 MIN: CPT | Performed by: FAMILY MEDICINE

## 2024-05-30 PROCEDURE — G2211 COMPLEX E/M VISIT ADD ON: HCPCS | Performed by: FAMILY MEDICINE

## 2024-05-30 NOTE — PROGRESS NOTES
Sienna Fontenot is a 58 year old female who presents for a pre-operative physical exam. Patient is to have (Bilateral breast reconstruction with tissue expanders on 6/12/24 with Dr Dowd at [facility]) , to be done by Dr. DOWD at [facility] on 6/12/24.      HPI:   Pt complains of left breast invasive ductal carcinoma [very pleasant 58-year-old female who is being evaluated today for the above reason.Patient underwent screening mammogram on 12/26/2023 which was designated BI-RADS Category 0 as there was an indeterminate mass in the upper central left breast.  On 3/18/2024, patient underwent a diagnostic mammogram which was designated a BI-RADS Category 5 as it revealed a hypoechoic mass at the 11 o'clock position biopsy revealed invasive ductal carcinoma grade 2, HER positive Estrogen receptor positive.  Has a sister who also had breast cancer, but different than  what Sienna has.  Current Outpatient Medications   Medication Sig Dispense Refill    Omeprazole 40 MG Oral Capsule Delayed Release Take 1 capsule (40 mg total) by mouth 2 (two) times daily before meals. 180 capsule 1    Pancrelipase, Lip-Prot-Amyl, (ZENPEP) 47641-957906 units Oral Cap DR Particles Take 2 capsules by mouth 3 (three) times daily with meals. May also take 1 capsule 2 (two) times daily as needed (snack). 540 capsule 2    ergocalciferol 1.25 MG (43455 UT) Oral Cap Take 1 capsule (50,000 Units total) by mouth once a week. 12 capsule 1    Diethylpropion HCl ER 75 MG Oral Tablet 24 Hr Take 1 tablet (75 mg total) by mouth every morning. 90 tablet 0      Allergies:   Allergies   Allergen Reactions    Terbutaline      Swelling/tachycardia-given to pt for premature labor      Past Medical History:    Disorder of thyroid    Fibrocystic breast disease    Fibromyalgia    Obesity, unspecified    PCOS (polycystic ovarian syndrome)    Problems with swallowing    Visual impairment    glasses      Past Surgical History:   Procedure Laterality  Date    Colonoscopy N/A 2022    Procedure: COLONOSCOPY;  Surgeon: Justus Taylor MD;  Location: [facility] ENDOSCOPY    Cyst aspiration right      Fracture surgery      Jose localization wire 1 site left (cpt=[id number])      BENIGN    Needle biopsy left  2010    BENIGN    Oophorectomy Bilateral 2023    [facility]    Other      left hand, injury    Other Right 09/10/2019    Right femur rodding     Other surgical history Right 2018    arthroscopy with partial medial mensicectomy    Repair rotator cuff,acute Left     Total abdominal hysterectomy  2023    [facility] [place]    Tubal ligation        Family History   Problem Relation Age of Onset    Lipids Mother     Obesity Mother     Heart Disorder Father     Hypertension Father     Breast Cancer Sister 47    Other (sudden death) Brother     Breast Cancer Paternal Aunt 55    Breast Cancer Paternal Aunt 60      Social History:   Social History     Socioeconomic History    Marital status:    Tobacco Use    Smoking status: Former     Current packs/day: 0.00     Average packs/day: 0.1 packs/day for 3.0 years (0.3 ttl pk-yrs)     Types: Cigarettes     Start date: 1990     Quit date: 1993     Years since quittin.1    Smokeless tobacco: Never   Vaping Use    Vaping status: Never Used   Substance and Sexual Activity    Alcohol use: Yes     Alcohol/week: 0.0 - 2.0 standard drinks of alcohol     Comment: social    Drug use: Never     Comment: rare   Other Topics Concern    Caffeine Concern Yes     Comment: 1-2 cups coffee daily    Exercise Yes     Comment: 4-5 x weekly     Social Determinants of Health     Food Insecurity: Food Insecurity Present (2023)    Received from [facility]    Food Insecurity     Currently or in the past 3 months, have you worried your food would run out before you had money to buy more?: Yes     In the past 12 months, have you run out of food or been unable to get more?: No   Transportation  Needs: No Transportation Needs (9/13/2023)    Received from [facility]    Transportation Needs     Medical Transportation Needs?: No    Received from [facility]    Housing Stability      Occ: . : . Children: 2.   Exercise: none.  Diet: doesn't watch     REVIEW OF SYSTEMS:   GENERAL: feels well otherwise  SKIN: denies any unusual skin lesions  EYES:denies blurred vision or double vision  HEENT: denies nasal congestion, sinus pain or ST  LUNGS: denies shortness of breath with exertion  CARDIOVASCULAR: denies chest pain on exertion  GI: denies abdominal pain,denies heartburn  : denies dysuria, vaginal discharge or itching,periods regular denies nocturia or changes in stream  MUSCULOSKELETAL: denies back pain  NEURO: denies headaches  PSYCHE: denies depression or anxiety  HEMATOLOGIC: denies hx of anemia  ENDOCRINE: denies thyroid history  ALL/ASTHMA: denies hx of allergy or asthma    EXAM:   /78   Pulse 75   Temp 97.1 °F (36.2 °C) (Temporal)   Resp 16   Wt 238 lb 6 oz (108.1 kg)   SpO2 98%   BMI 36.78 kg/m²   GENERAL: well developed, well nourished,in no apparent distress  SKIN: no rashes,no suspicious lesions  HEENT: atraumatic, normocephalic,ears and throat are clear  EYES:PERRLA, EOMI, normal optic disk,conjunctiva are clear  NECK: supple,no adenopathy,no bruits  CHEST: no chest tenderness  LUNGS: clear to auscultation  CARDIO: RRR without murmur  GI: good BS's,no masses, HSM or tenderness  MUSCULOSKELETAL: back is not tender,FROM of the back  EXTREMITIES: no cyanosis, clubbing or edema  NEURO: Oriented times three,cranial nerves are intact,motor and sensory are grossly intact    ASSESSMENT AND PLAN:     Encounter Diagnoses   Name Primary?    Preop examination Yes    Invasive ductal carcinoma of left breast, stage 2 (HCC)          Imaging & Consults:  ELECTROCARDIOGRAM, COMPLETE  EKG shows NSR  NO ACUTE ST-T WAVE CHANGES  NORMAL  INTERVALS AND AXIS      Sienna Fontenot is a 58 year old female who presents for a pre-operative physical exam. Patient is to have , (Bilateral breast reconstruction with tissue expanders on 6/12/24 with Dr Dowd at [facility]) , to be done by Dr. DOWD at [facility] on 6/12/24.   Pt has no significant history of cardiac or pulmonary conditions. Pt is a good surgical candidate. This consult was sent back the referring physician, Dr. Dowd.  REVIEWED HER CMP AND COMPLETE BLOOD COUNT FROM [facility]IL AND APPEARS TO BE ACCEPTABLE, MEDICALLY CLEAR FOR SURGERY    MEDICALLY CLEAR FOR SURGERY

## 2024-06-03 DIAGNOSIS — E66.01 CLASS 2 SEVERE OBESITY WITH SERIOUS COMORBIDITY AND BODY MASS INDEX (BMI) OF 38.0 TO 38.9 IN ADULT, UNSPECIFIED OBESITY TYPE (HCC): ICD-10-CM

## 2024-06-03 DIAGNOSIS — Z51.81 ENCOUNTER FOR THERAPEUTIC DRUG MONITORING: ICD-10-CM

## 2024-06-03 DIAGNOSIS — D50.0 IRON DEFICIENCY ANEMIA DUE TO CHRONIC BLOOD LOSS: ICD-10-CM

## 2024-06-03 DIAGNOSIS — K21.00 GASTROESOPHAGEAL REFLUX DISEASE WITH ESOPHAGITIS WITHOUT HEMORRHAGE: ICD-10-CM

## 2024-06-03 DIAGNOSIS — Z00.00 ROUTINE HEALTH MAINTENANCE: ICD-10-CM

## 2024-06-03 DIAGNOSIS — G25.81 RESTLESS LEGS: ICD-10-CM

## 2024-06-03 RX ORDER — PANCRELIPASE LIPASE, PANCRELIPASE PROTEASE, PANCRELIPASE AMYLASE 40000; 126000; 168000 [USP'U]/1; [USP'U]/1; [USP'U]/1
CAPSULE, DELAYED RELEASE ORAL
Qty: 540 CAPSULE | Refills: 2 | Status: SHIPPED | OUTPATIENT
Start: 2024-06-03

## 2024-06-03 RX ORDER — OMEPRAZOLE 40 MG/1
40 CAPSULE, DELAYED RELEASE ORAL
Qty: 180 CAPSULE | Refills: 1 | Status: SHIPPED | OUTPATIENT
Start: 2024-06-03 | End: 2024-11-30

## 2024-06-03 NOTE — TELEPHONE ENCOUNTER
PT CALLED AND ADV NEEDS SOME REFILLS BEFORE SURGERY :     Omeprazole 40 MG Oral Capsule Delayed Release     Pancrelipase, Lip-Prot-Amyl, (ZENPEP) 61118-280626 units Oral Cap DR Izquierdo     ** WAS ALSO ADV THAT  WAS GOING TO PRESCRIBE A FAST ACTING ANXIETY MEDICATION FOR SURGERY **    ** PLEASE SEND TO ELIE LAKHANI  **    THANK YOU

## 2024-06-03 NOTE — TELEPHONE ENCOUNTER
Medications pended for provider review- patient is also requesting PRN mediation for     ** WAS ALSO ADV THAT DR WAS GOING TO PRESCRIBE A FAST ACTING ANXIETY MEDICATION FOR SURGERY **

## 2024-08-02 ENCOUNTER — TELEPHONE (OUTPATIENT)
Dept: FAMILY MEDICINE CLINIC | Facility: CLINIC | Age: 59
End: 2024-08-02

## 2024-08-02 DIAGNOSIS — F41.9 ANXIETY: ICD-10-CM

## 2024-08-02 RX ORDER — ALPRAZOLAM 0.25 MG/1
0.25 TABLET ORAL EVERY 6 HOURS PRN
Qty: 30 TABLET | Refills: 0 | Status: SHIPPED | OUTPATIENT
Start: 2024-08-02

## 2024-09-23 ENCOUNTER — TELEPHONE (OUTPATIENT)
Dept: FAMILY MEDICINE CLINIC | Facility: CLINIC | Age: 59
End: 2024-09-23

## 2024-09-23 DIAGNOSIS — E78.5 LIPIDS BLOOD INCREASED: Primary | ICD-10-CM

## 2024-09-23 NOTE — TELEPHONE ENCOUNTER
Patient scheduling for her MAW on 11/4. She states that she gets her labs done every week at the hospital that does her chemo. Patient requesting Dr. Catalan to advise if he needs anything specific/ additional completed at the [facility]. Endorsed to RN.

## 2024-09-23 NOTE — TELEPHONE ENCOUNTER
RN spoke to patient and advised that she does not need anything at this time as she has had great monitoring of labs

## 2024-11-03 NOTE — TELEPHONE ENCOUNTER
Question Answer   Anesthesia Type Sedation   Provider Nancy Thomas   Location Lab   Procedure Facet   Laterality/Level Left diagnostic lumbar facet joint injection at L4-L5 and L5-S1 level under fluoroscopy   Implants No   Medical clearance requested (will send to [facility]) No   Patient has Medicare coverage?  Yes Substance dependence

## 2024-11-06 ENCOUNTER — TELEPHONE (OUTPATIENT)
Dept: FAMILY MEDICINE CLINIC | Facility: CLINIC | Age: 59
End: 2024-11-06

## 2024-11-06 ENCOUNTER — OFFICE VISIT (OUTPATIENT)
Dept: FAMILY MEDICINE CLINIC | Facility: CLINIC | Age: 59
End: 2024-11-06
Payer: MEDICARE

## 2024-11-06 ENCOUNTER — HOSPITAL ENCOUNTER (OUTPATIENT)
Dept: GENERAL RADIOLOGY | Age: 59
Discharge: HOME | End: 2024-11-06
Attending: FAMILY MEDICINE
Payer: MEDICARE

## 2024-11-06 VITALS
RESPIRATION RATE: 20 BRPM | HEART RATE: 91 BPM | OXYGEN SATURATION: 96 % | BODY MASS INDEX: 38 KG/M2 | SYSTOLIC BLOOD PRESSURE: 120 MMHG | WEIGHT: 244.25 LBS | TEMPERATURE: 97 F | DIASTOLIC BLOOD PRESSURE: 82 MMHG

## 2024-11-06 DIAGNOSIS — C50.512 MALIGNANT NEOPLASM OF LOWER-OUTER QUADRANT OF LEFT BREAST OF FEMALE, ESTROGEN RECEPTOR POSITIVE (HCC): ICD-10-CM

## 2024-11-06 DIAGNOSIS — E66.01 CLASS 2 SEVERE OBESITY WITH SERIOUS COMORBIDITY AND BODY MASS INDEX (BMI) OF 38.0 TO 38.9 IN ADULT, UNSPECIFIED OBESITY TYPE (HCC): ICD-10-CM

## 2024-11-06 DIAGNOSIS — Z17.0 MALIGNANT NEOPLASM OF LOWER-OUTER QUADRANT OF LEFT BREAST OF FEMALE, ESTROGEN RECEPTOR POSITIVE (HCC): ICD-10-CM

## 2024-11-06 DIAGNOSIS — R05.1 ACUTE COUGH: ICD-10-CM

## 2024-11-06 DIAGNOSIS — J98.01 BRONCHOSPASM: ICD-10-CM

## 2024-11-06 DIAGNOSIS — R05.1 ACUTE COUGH: Primary | ICD-10-CM

## 2024-11-06 DIAGNOSIS — E66.812 CLASS 2 SEVERE OBESITY WITH SERIOUS COMORBIDITY AND BODY MASS INDEX (BMI) OF 38.0 TO 38.9 IN ADULT, UNSPECIFIED OBESITY TYPE (HCC): ICD-10-CM

## 2024-11-06 PROCEDURE — 94640 AIRWAY INHALATION TREATMENT: CPT | Performed by: FAMILY MEDICINE

## 2024-11-06 PROCEDURE — 99214 OFFICE O/P EST MOD 30 MIN: CPT | Performed by: FAMILY MEDICINE

## 2024-11-06 PROCEDURE — 71046 X-RAY EXAM CHEST 2 VIEWS: CPT | Performed by: FAMILY MEDICINE

## 2024-11-06 RX ORDER — ACETAMINOPHEN 650 MG
1 TABLET, EXTENDED RELEASE ORAL 2 TIMES DAILY
COMMUNITY
Start: 2024-06-19

## 2024-11-06 RX ORDER — LIDOCAINE/PRILOCAINE 2.5 %-2.5%
CREAM (GRAM) TOPICAL
COMMUNITY
Start: 2024-07-15

## 2024-11-06 RX ORDER — SERTRALINE HYDROCHLORIDE 100 MG/1
100 TABLET, FILM COATED ORAL NIGHTLY
COMMUNITY
Start: 2024-10-02

## 2024-11-06 RX ORDER — BENZOCAINE/MENTHOL 6 MG-10 MG
LOZENGE MUCOUS MEMBRANE 2 TIMES DAILY
COMMUNITY
Start: 2024-06-19

## 2024-11-06 RX ORDER — GABAPENTIN 100 MG/1
CAPSULE ORAL
COMMUNITY
Start: 2024-07-10

## 2024-11-06 RX ORDER — CLONAZEPAM 0.5 MG/1
0.5 TABLET ORAL 2 TIMES DAILY PRN
COMMUNITY
Start: 2024-10-02

## 2024-11-06 RX ORDER — GABAPENTIN 300 MG/1
300 CAPSULE ORAL 3 TIMES DAILY
COMMUNITY
Start: 2024-07-23

## 2024-11-06 RX ORDER — ANASTROZOLE 1 MG/1
1 TABLET ORAL DAILY
COMMUNITY
Start: 2024-10-16

## 2024-11-06 RX ORDER — HYDROCODONE BITARTRATE AND ACETAMINOPHEN 5; 325 MG/1; MG/1
1 TABLET ORAL EVERY 6 HOURS PRN
COMMUNITY
Start: 2024-07-23

## 2024-11-06 RX ORDER — ALBUTEROL SULFATE 0.83 MG/ML
2.5 SOLUTION RESPIRATORY (INHALATION) EVERY 6 HOURS PRN
Qty: 1 EACH | Refills: 3 | Status: SHIPPED | OUTPATIENT
Start: 2024-11-06

## 2024-11-06 RX ORDER — CLINDAMYCIN PHOSPHATE 11.9 MG/ML
SOLUTION TOPICAL 2 TIMES DAILY
COMMUNITY
Start: 2024-09-11

## 2024-11-06 RX ORDER — BENZONATATE 100 MG/1
100 CAPSULE ORAL 3 TIMES DAILY
COMMUNITY
Start: 2024-11-05

## 2024-11-06 RX ORDER — PSEUDOEPHEDRINE HCL 30 MG
100 TABLET ORAL 2 TIMES DAILY
COMMUNITY
Start: 2024-07-23

## 2024-11-06 RX ORDER — PROMETHAZINE HYDROCHLORIDE AND CODEINE PHOSPHATE 6.25; 1 MG/5ML; MG/5ML
5 SYRUP ORAL NIGHTLY
Qty: 100 ML | Refills: 0 | Status: SHIPPED | OUTPATIENT
Start: 2024-11-06

## 2024-11-06 RX ORDER — ONDANSETRON 8 MG/1
8 TABLET, FILM COATED ORAL
COMMUNITY
Start: 2024-07-31

## 2024-11-06 RX ORDER — IPRATROPIUM BROMIDE AND ALBUTEROL SULFATE 2.5; .5 MG/3ML; MG/3ML
3 SOLUTION RESPIRATORY (INHALATION) ONCE
Status: COMPLETED | OUTPATIENT
Start: 2024-11-06 | End: 2024-11-06

## 2024-11-06 RX ORDER — ALBUTEROL SULFATE 0.83 MG/ML
2.5 SOLUTION RESPIRATORY (INHALATION) ONCE
Status: COMPLETED | OUTPATIENT
Start: 2024-11-06 | End: 2024-11-06

## 2024-11-06 RX ORDER — SILVER SULFADIAZINE 10 MG/G
CREAM TOPICAL 2 TIMES DAILY
COMMUNITY
Start: 2024-07-17

## 2024-11-06 RX ORDER — METHYLPREDNISOLONE 4 MG/1
TABLET ORAL
Qty: 1 EACH | Refills: 0 | Status: SHIPPED | OUTPATIENT
Start: 2024-11-06

## 2024-11-06 RX ADMIN — IPRATROPIUM BROMIDE AND ALBUTEROL SULFATE 3 ML: 2.5; .5 SOLUTION RESPIRATORY (INHALATION) at 15:13:00

## 2024-11-06 RX ADMIN — ALBUTEROL SULFATE 2.5 MG: 0.83 SOLUTION RESPIRATORY (INHALATION) at 15:39:00

## 2024-11-06 NOTE — TELEPHONE ENCOUNTER
Patient received nebulizer machine from office today.    Signed nebulizer order faxed to [facility]

## 2024-11-06 NOTE — PROGRESS NOTES
Sienna Fontenot is a 58 year old female.  HPI:   Sienna has a hx of Breast caner of the left breast and has been treated with chemotherapy. She had a fever initially to 101 , cough is dry and nonproductive, has been through 4 bottles of cough syrup , tessalon perles, and a number of other decongestants and cough meds, no CXR, no nebulizer treatments  Current Outpatient Medications   Medication Sig Dispense Refill    silver sulfADIAZINE 1 % External Cream Apply topically 2 (two) times daily.      sertraline 100 MG Oral Tab Take 1 tablet (100 mg total) by mouth nightly.      povidone-iodine 10 % External Solution Apply 1 Application topically 2 (two) times daily.      ondansetron (ZOFRAN) 8 MG tablet Take 1 tablet (8 mg total) by mouth.      lidocaine-prilocaine 2.5-2.5 % External Cream Apply topically.      hydrocortisone 1 % External Cream Apply topically 2 (two) times daily.      HYDROcodone-acetaminophen 5-325 MG Oral Tab Take 1 tablet by mouth every 6 (six) hours as needed.      gabapentin 300 MG Oral Cap Take 1 capsule (300 mg total) by mouth 3 (three) times daily.      gabapentin 100 MG Oral Cap       docusate sodium 100 MG Oral Cap Take 100 mg by mouth 2 (two) times daily.      clonazePAM 0.5 MG Oral Tab Take 1 tablet (0.5 mg total) by mouth 2 (two) times daily as needed.      clindamycin 1 % External Solution Apply topically 2 (two) times daily.      benzonatate 100 MG Oral Cap Take 1 capsule (100 mg total) by mouth 3 (three) times daily.      anastrozole 1 MG Oral Tab tab Take 1 tablet (1 mg total) by mouth daily.      methylPREDNISolone (MEDROL) 4 MG Oral Tablet Therapy Pack As directed. 1 each 0    albuterol (2.5 MG/3ML) 0.083% Inhalation Nebu Soln Take 3 mL (2.5 mg total) by nebulization every 6 (six) hours as needed for Wheezing. 1 box please 1 each 3    ALPRAZolam 0.25 MG Oral Tab Take 1 tablet (0.25 mg total) by mouth every 6 (six) hours as needed. 30 tablet 0    Pancrelipase, Lip-Prot-Amyl,  (ZENPEP) 81109-787514 units Oral Cap DR Particles Take 2 capsules by mouth 3 (three) times daily with meals. May also take 1 capsule 2 (two) times daily as needed (snack). 540 capsule 2    Omeprazole 40 MG Oral Capsule Delayed Release Take 1 capsule (40 mg total) by mouth 2 (two) times daily before meals. 180 capsule 1    ergocalciferol 1.25 MG (96101 UT) Oral Cap Take 1 capsule (50,000 Units total) by mouth once a week. 12 capsule 1    Diethylpropion HCl ER 75 MG Oral Tablet 24 Hr Take 1 tablet (75 mg total) by mouth every morning. 90 tablet 0      Past Medical History:    Disorder of thyroid    Fibrocystic breast disease    Fibromyalgia    Obesity, unspecified    PCOS (polycystic ovarian syndrome)    Problems with swallowing    Visual impairment    glasses      Social History:  Social History     Socioeconomic History    Marital status:    Tobacco Use    Smoking status: Former     Current packs/day: 0.00     Average packs/day: 0.1 packs/day for 3.0 years (0.3 ttl pk-yrs)     Types: Cigarettes     Start date: 1990     Quit date: 1993     Years since quittin.5    Smokeless tobacco: Never   Vaping Use    Vaping status: Never Used   Substance and Sexual Activity    Alcohol use: Yes     Alcohol/week: 0.0 - 2.0 standard drinks of alcohol     Comment: social    Drug use: Never     Comment: rare   Other Topics Concern    Caffeine Concern Yes     Comment: 1-2 cups coffee daily    Exercise Yes     Comment: 4-5 x weekly     Social Drivers of Health     Food Insecurity: Food Insecurity Present (2024)    Received from [facility]    Food Insecurity     Currently or in the past 3 months, have you worried your food would run out before you had money to buy more?: Yes     In the past 12 months, have you run out of food or been unable to get more?: Yes   Transportation Needs: No Transportation Needs (2024)    Received from [facility]    Transportation Needs      Medical Transportation Needs?: No   Housing Stability: Low Risk  (7/31/2024)    Received from [facility]    Housing Stability     Mortgage Payment Concerns?: No        REVIEW OF SYSTEMS:   GENERAL HEALTH: feels terrible has been coughing for almost 2 weeks cannot stop  HEENT: has head and nasal congestion  NECK denies LA  SKIN: denies any unusual skin lesions or rashes  RESPIRATORY:  shortness of breath with exertion, and speaking  CARDIOVASCULAR: denies chest pain on exertion  GI: denies abdominal pain and denies heartburn  NEURO: denies headaches  EXT: denies EDEMA  HEMATOLOGY: hx of breast cancer in treatment currently  EXAM:   /82   Pulse 91   Temp 97.4 °F (36.3 °C) (Temporal)   Resp 20   Wt 244 lb 4 oz (110.8 kg)   SpO2 96%   BMI 37.69 kg/m²   GENERAL: well developed, well nourished,in no apparent distress  SKIN: no rashes,no suspicious lesions  HEENT: atraumatic, normocephalic,ears and throat are clear  NECK: supple,no adenopathy,no bruits  LUNGS: lungs are tight with inspiration, there crackles in the right base  CARDIO: RRR without murmur  GI: good BS's,no masses, HSM or tenderness  EXTREMITIES: no cyanosis, clubbing or edema    ASSESSMENT AND PLAN:     Encounter Diagnoses   Name Primary?    Acute cough Yes    Bronchospasm     Class 2 severe obesity with serious comorbidity and body mass index (BMI) of 38.0 to 38.9 in adult, unspecified obesity type (HCC)     Malignant neoplasm of lower-outer quadrant of left breast of female, estrogen receptor positive (HCC)      Was given nebulizer treatment with good improvement of air movement, and resolution of wheezing after ipratropium  Was given  2nd nebulizer treatment , with plain albuterol with good improvement of air movement, and resolution of wheezing  START MEDROL DOSE CIRO TODAY   The patient indicates understanding of these issues and agrees to the plan.  The patient is asked to return in 10 days.

## 2024-11-13 DIAGNOSIS — R05.1 ACUTE COUGH: Primary | ICD-10-CM

## 2024-11-13 RX ORDER — HYDROCODONE BITARTRATE AND HOMATROPINE METHYLBROMIDE ORAL SOLUTION 5; 1.5 MG/5ML; MG/5ML
5 LIQUID ORAL EVERY 6 HOURS PRN
Qty: 100 ML | Refills: 0 | Status: SHIPPED | OUTPATIENT
Start: 2024-11-13

## 2024-11-20 ENCOUNTER — OFFICE VISIT (OUTPATIENT)
Dept: FAMILY MEDICINE CLINIC | Facility: CLINIC | Age: 59
End: 2024-11-20
Payer: MEDICARE

## 2024-11-20 VITALS
SYSTOLIC BLOOD PRESSURE: 110 MMHG | BODY MASS INDEX: 37 KG/M2 | RESPIRATION RATE: 16 BRPM | HEART RATE: 87 BPM | TEMPERATURE: 98 F | WEIGHT: 241.25 LBS | OXYGEN SATURATION: 97 % | DIASTOLIC BLOOD PRESSURE: 78 MMHG

## 2024-11-20 DIAGNOSIS — J98.01 BRONCHOSPASM: Primary | ICD-10-CM

## 2024-11-20 DIAGNOSIS — R05.1 ACUTE COUGH: ICD-10-CM

## 2024-11-20 PROCEDURE — 99214 OFFICE O/P EST MOD 30 MIN: CPT | Performed by: FAMILY MEDICINE

## 2024-11-20 RX ORDER — METHYLPREDNISOLONE 4 MG/1
TABLET ORAL
Qty: 21 EACH | Refills: 0 | Status: SHIPPED | OUTPATIENT
Start: 2024-11-20

## 2024-11-20 NOTE — PROGRESS NOTES
Sienna Fontenot is a 58 year old female.  HPI:   Sienna has a hx of Breast caner of the left breast and has been treated with chemotherapy. She had a fever initially to 101 , cough is dry and nonproductive, has been through 4 bottles of cough syrup , tessalon perles, and a number of other decongestants and cough meds, CXR was negative was started on Q4 nebs and medrol dose kimmy and is doing better   Current Outpatient Medications   Medication Sig Dispense Refill    methylPREDNISolone (MEDROL) 4 MG Oral Tablet Therapy Pack As directed. 21 each 0    HYDROcodone-homatropine 5-1.5 MG/5ML Oral Solution Take 5 mL by mouth every 6 (six) hours as needed. 100 mL 0    silver sulfADIAZINE 1 % External Cream Apply topically 2 (two) times daily.      sertraline 100 MG Oral Tab Take 1 tablet (100 mg total) by mouth nightly.      povidone-iodine 10 % External Solution Apply 1 Application topically 2 (two) times daily.      ondansetron (ZOFRAN) 8 MG tablet Take 1 tablet (8 mg total) by mouth.      lidocaine-prilocaine 2.5-2.5 % External Cream Apply topically.      hydrocortisone 1 % External Cream Apply topically 2 (two) times daily.      HYDROcodone-acetaminophen 5-325 MG Oral Tab Take 1 tablet by mouth every 6 (six) hours as needed.      gabapentin 300 MG Oral Cap Take 1 capsule (300 mg total) by mouth 3 (three) times daily.      gabapentin 100 MG Oral Cap       docusate sodium 100 MG Oral Cap Take 100 mg by mouth 2 (two) times daily.      clonazePAM 0.5 MG Oral Tab Take 1 tablet (0.5 mg total) by mouth 2 (two) times daily as needed.      clindamycin 1 % External Solution Apply topically 2 (two) times daily.      benzonatate 100 MG Oral Cap Take 1 capsule (100 mg total) by mouth 3 (three) times daily.      anastrozole 1 MG Oral Tab tab Take 1 tablet (1 mg total) by mouth daily.      albuterol (2.5 MG/3ML) 0.083% Inhalation Nebu Soln Take 3 mL (2.5 mg total) by nebulization every 6 (six) hours as needed for Wheezing. 1 box  please 1 each 3    ALPRAZolam 0.25 MG Oral Tab Take 1 tablet (0.25 mg total) by mouth every 6 (six) hours as needed. 30 tablet 0    Pancrelipase, Lip-Prot-Amyl, (ZENPEP) 79102-937009 units Oral Cap DR Particles Take 2 capsules by mouth 3 (three) times daily with meals. May also take 1 capsule 2 (two) times daily as needed (snack). 540 capsule 2    Omeprazole 40 MG Oral Capsule Delayed Release Take 1 capsule (40 mg total) by mouth 2 (two) times daily before meals. 180 capsule 1    ergocalciferol 1.25 MG (14934 UT) Oral Cap Take 1 capsule (50,000 Units total) by mouth once a week. 12 capsule 1    Diethylpropion HCl ER 75 MG Oral Tablet 24 Hr Take 1 tablet (75 mg total) by mouth every morning. 90 tablet 0      Past Medical History:    Disorder of thyroid    Fibrocystic breast disease    Fibromyalgia    Obesity, unspecified    PCOS (polycystic ovarian syndrome)    Problems with swallowing    Visual impairment    glasses      Social History:  Social History     Socioeconomic History    Marital status:    Tobacco Use    Smoking status: Former     Current packs/day: 0.00     Average packs/day: 0.1 packs/day for 3.0 years (0.3 ttl pk-yrs)     Types: Cigarettes     Start date: 1990     Quit date: 1993     Years since quittin.6    Smokeless tobacco: Never   Vaping Use    Vaping status: Never Used   Substance and Sexual Activity    Alcohol use: Yes     Alcohol/week: 0.0 - 2.0 standard drinks of alcohol     Comment: social    Drug use: Never     Comment: rare   Other Topics Concern    Caffeine Concern Yes     Comment: 1-2 cups coffee daily    Exercise Yes     Comment: 4-5 x weekly     Social Drivers of Health     Food Insecurity: Food Insecurity Present (2024)    Received from [facility]    Food Insecurity     Currently or in the past 3 months, have you worried your food would run out before you had money to buy more?: Yes     In the past 12 months, have you run out of food or  been unable to get more?: Yes   Transportation Needs: No Transportation Needs (7/31/2024)    Received from [facility]    Transportation Needs     Medical Transportation Needs?: No   Housing Stability: Low Risk  (7/31/2024)    Received from [facility]    Housing Stability     Mortgage Payment Concerns?: No        REVIEW OF SYSTEMS:   GENERAL HEALTH: feels terrible has been coughing for almost 2 weeks cannot stop  HEENT: has head and nasal congestion  NECK denies LA  SKIN: denies any unusual skin lesions or rashes  RESPIRATORY:  shortness of breath with exertion, has improved still coughing but not nearly as much  CARDIOVASCULAR: denies chest pain on exertion  GI: denies abdominal pain and denies heartburn  NEURO: denies headaches  EXT: denies EDEMA  HEMATOLOGY: hx of breast cancer in treatment currently  EXAM:   /78   Pulse 87   Temp 97.5 °F (36.4 °C) (Temporal)   Resp 16   Wt 241 lb 4 oz (109.4 kg)   SpO2 97%   BMI 37.23 kg/m²   GENERAL: well developed, well nourished,in no apparent distress  SKIN: no rashes,no suspicious lesions  HEENT: atraumatic, normocephalic,ears and throat are clear  NECK: supple,no adenopathy,no bruits  LUNGS: lungs are not as tight, previously noted crackles in the right base have since resolved  CARDIO: RRR without murmur  GI: good BS's,no masses, HSM or tenderness  EXTREMITIES: no cyanosis, clubbing or edema    ASSESSMENT AND PLAN:     Encounter Diagnoses   Name Primary?    Bronchospasm Yes    Acute cough    Is doing better, but would like her to use her nebulizer at least once a day for another 5-7 days, one more round of medrol and can use cough medicine as needed,

## 2025-04-02 ENCOUNTER — TELEPHONE (OUTPATIENT)
Dept: FAMILY MEDICINE CLINIC | Facility: CLINIC | Age: 60
End: 2025-04-02

## 2025-04-02 NOTE — TELEPHONE ENCOUNTER
Patient scheduled 5/19/25 for pre-operative physical    breast reconstruction, Dr. Dowd, @ [facility], 6/16/25     No orders in blue book    Please adv  Thank you

## 2025-05-19 ENCOUNTER — OFFICE VISIT (OUTPATIENT)
Dept: FAMILY MEDICINE CLINIC | Facility: CLINIC | Age: 60
End: 2025-05-19
Payer: MEDICARE

## 2025-05-19 ENCOUNTER — TELEPHONE (OUTPATIENT)
Dept: FAMILY MEDICINE CLINIC | Facility: CLINIC | Age: 60
End: 2025-05-19

## 2025-05-19 VITALS
BODY MASS INDEX: 38 KG/M2 | TEMPERATURE: 98 F | DIASTOLIC BLOOD PRESSURE: 86 MMHG | WEIGHT: 249.38 LBS | RESPIRATION RATE: 16 BRPM | HEART RATE: 81 BPM | SYSTOLIC BLOOD PRESSURE: 126 MMHG | OXYGEN SATURATION: 96 %

## 2025-05-19 DIAGNOSIS — E66.01 CLASS 2 SEVERE OBESITY WITH SERIOUS COMORBIDITY AND BODY MASS INDEX (BMI) OF 38.0 TO 38.9 IN ADULT, UNSPECIFIED OBESITY TYPE (HCC): ICD-10-CM

## 2025-05-19 DIAGNOSIS — C50.512 MALIGNANT NEOPLASM OF LOWER-OUTER QUADRANT OF LEFT BREAST OF FEMALE, ESTROGEN RECEPTOR POSITIVE (HCC): ICD-10-CM

## 2025-05-19 DIAGNOSIS — K21.00 GASTROESOPHAGEAL REFLUX DISEASE WITH ESOPHAGITIS WITHOUT HEMORRHAGE: ICD-10-CM

## 2025-05-19 DIAGNOSIS — E66.812 CLASS 2 SEVERE OBESITY WITH SERIOUS COMORBIDITY AND BODY MASS INDEX (BMI) OF 38.0 TO 38.9 IN ADULT, UNSPECIFIED OBESITY TYPE (HCC): ICD-10-CM

## 2025-05-19 DIAGNOSIS — Z17.0 MALIGNANT NEOPLASM OF LOWER-OUTER QUADRANT OF LEFT BREAST OF FEMALE, ESTROGEN RECEPTOR POSITIVE (HCC): ICD-10-CM

## 2025-05-19 DIAGNOSIS — Z01.818 PREOP EXAMINATION: Primary | ICD-10-CM

## 2025-05-19 DIAGNOSIS — Z51.81 ENCOUNTER FOR THERAPEUTIC DRUG MONITORING: ICD-10-CM

## 2025-05-19 PROBLEM — M81.8 OTHER OSTEOPOROSIS WITHOUT CURRENT PATHOLOGICAL FRACTURE: Status: ACTIVE | Noted: 2025-03-07

## 2025-05-19 LAB
ALBUMIN SERPL-MCNC: 4.9 G/DL (ref 3.2–4.8)
ALBUMIN/GLOB SERPL: 1.4 {RATIO} (ref 1–2)
ALP LIVER SERPL-CCNC: 91 U/L (ref 46–118)
ALT SERPL-CCNC: 17 U/L (ref 10–49)
ANION GAP SERPL CALC-SCNC: 6 MMOL/L (ref 0–18)
AST SERPL-CCNC: 23 U/L (ref ?–34)
BASOPHILS # BLD AUTO: 0.05 X10(3) UL (ref 0–0.2)
BASOPHILS NFR BLD AUTO: 0.6 %
BILIRUB SERPL-MCNC: 0.4 MG/DL (ref 0.3–1.2)
BUN BLD-MCNC: 12 MG/DL (ref 9–23)
CALCIUM BLD-MCNC: 9.7 MG/DL (ref 8.7–10.6)
CHLORIDE SERPL-SCNC: 105 MMOL/L (ref 98–112)
CO2 SERPL-SCNC: 27 MMOL/L (ref 21–32)
CREAT BLD-MCNC: 0.81 MG/DL (ref 0.55–1.02)
EGFRCR SERPLBLD CKD-EPI 2021: 84 ML/MIN/1.73M2 (ref 60–?)
EOSINOPHIL # BLD AUTO: 0.12 X10(3) UL (ref 0–0.7)
EOSINOPHIL NFR BLD AUTO: 1.3 %
ERYTHROCYTE [DISTWIDTH] IN BLOOD BY AUTOMATED COUNT: 17.6 %
EST. AVERAGE GLUCOSE BLD GHB EST-MCNC: 123 MG/DL (ref 68–126)
FASTING STATUS PATIENT QL REPORTED: YES
GLOBULIN PLAS-MCNC: 3.4 G/DL (ref 2–3.5)
GLUCOSE BLD-MCNC: 87 MG/DL (ref 70–99)
HBA1C MFR BLD: 5.9 % (ref ?–5.7)
HCT VFR BLD AUTO: 35.8 % (ref 35–48)
HGB BLD-MCNC: 10.9 G/DL (ref 12–16)
IMM GRANULOCYTES # BLD AUTO: 0.02 X10(3) UL (ref 0–1)
IMM GRANULOCYTES NFR BLD: 0.2 %
LYMPHOCYTES # BLD AUTO: 3.12 X10(3) UL (ref 1–4)
LYMPHOCYTES NFR BLD AUTO: 34.4 %
MCH RBC QN AUTO: 23.3 PG (ref 26–34)
MCHC RBC AUTO-ENTMCNC: 30.4 G/DL (ref 31–37)
MCV RBC AUTO: 76.7 FL (ref 80–100)
MONOCYTES # BLD AUTO: 0.57 X10(3) UL (ref 0.1–1)
MONOCYTES NFR BLD AUTO: 6.3 %
NEUTROPHILS # BLD AUTO: 5.19 X10 (3) UL (ref 1.5–7.7)
NEUTROPHILS # BLD AUTO: 5.19 X10(3) UL (ref 1.5–7.7)
NEUTROPHILS NFR BLD AUTO: 57.2 %
OSMOLALITY SERPL CALC.SUM OF ELEC: 285 MOSM/KG (ref 275–295)
PLATELET # BLD AUTO: 427 10(3)UL (ref 150–450)
POTASSIUM SERPL-SCNC: 3.9 MMOL/L (ref 3.5–5.1)
PROT SERPL-MCNC: 8.3 G/DL (ref 5.7–8.2)
RBC # BLD AUTO: 4.67 X10(6)UL (ref 3.8–5.3)
SODIUM SERPL-SCNC: 138 MMOL/L (ref 136–145)
WBC # BLD AUTO: 9.1 X10(3) UL (ref 4–11)

## 2025-05-19 PROCEDURE — 93000 ELECTROCARDIOGRAM COMPLETE: CPT | Performed by: FAMILY MEDICINE

## 2025-05-19 PROCEDURE — 83036 HEMOGLOBIN GLYCOSYLATED A1C: CPT | Performed by: FAMILY MEDICINE

## 2025-05-19 PROCEDURE — 99214 OFFICE O/P EST MOD 30 MIN: CPT | Performed by: FAMILY MEDICINE

## 2025-05-19 PROCEDURE — 80053 COMPREHEN METABOLIC PANEL: CPT | Performed by: FAMILY MEDICINE

## 2025-05-19 PROCEDURE — 85730 THROMBOPLASTIN TIME PARTIAL: CPT | Performed by: FAMILY MEDICINE

## 2025-05-19 PROCEDURE — 85610 PROTHROMBIN TIME: CPT | Performed by: FAMILY MEDICINE

## 2025-05-19 PROCEDURE — 85025 COMPLETE CBC W/AUTO DIFF WBC: CPT | Performed by: FAMILY MEDICINE

## 2025-05-19 RX ORDER — ERGOCALCIFEROL 1.25 MG/1
50000 CAPSULE, LIQUID FILLED ORAL WEEKLY
Qty: 12 CAPSULE | Refills: 1 | Status: SHIPPED | OUTPATIENT
Start: 2025-05-19

## 2025-05-19 RX ORDER — PANCRELIPASE LIPASE, PANCRELIPASE PROTEASE, PANCRELIPASE AMYLASE 40000; 126000; 168000 [USP'U]/1; [USP'U]/1; [USP'U]/1
CAPSULE, DELAYED RELEASE ORAL
Qty: 540 CAPSULE | Refills: 2 | Status: SHIPPED | OUTPATIENT
Start: 2025-05-19

## 2025-05-19 RX ORDER — DULOXETIN HYDROCHLORIDE 30 MG/1
CAPSULE, DELAYED RELEASE ORAL
COMMUNITY
Start: 2025-03-13 | End: 2025-05-19
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
## 2025-05-20 LAB
APTT PPP: 29.3 SECONDS (ref 23–36)
ATRIAL RATE: 61 BPM
INR BLD: 1.02 (ref 0.8–1.2)
P AXIS: 51 DEGREES
P-R INTERVAL: 158 MS
PROTHROMBIN TIME: 13.5 SECONDS (ref 11.6–14.8)
Q-T INTERVAL: 436 MS
QRS DURATION: 86 MS
QTC CALCULATION (BEZET): 438 MS
R AXIS: 9 DEGREES
T AXIS: 31 DEGREES
VENTRICULAR RATE: 61 BPM

## 2025-05-22 NOTE — TELEPHONE ENCOUNTER
Spoke to Sienna and she said that she saw the [facility] and has no questions at this time.

## 2025-06-09 ENCOUNTER — OFFICE VISIT (OUTPATIENT)
Dept: FAMILY MEDICINE CLINIC | Facility: CLINIC | Age: 60
End: 2025-06-09
Payer: MEDICARE

## 2025-06-09 ENCOUNTER — TELEPHONE (OUTPATIENT)
Dept: FAMILY MEDICINE CLINIC | Facility: CLINIC | Age: 60
End: 2025-06-09

## 2025-06-09 VITALS
RESPIRATION RATE: 16 BRPM | BODY MASS INDEX: 39 KG/M2 | WEIGHT: 251.38 LBS | TEMPERATURE: 97 F | OXYGEN SATURATION: 95 % | HEART RATE: 94 BPM | DIASTOLIC BLOOD PRESSURE: 82 MMHG | SYSTOLIC BLOOD PRESSURE: 120 MMHG

## 2025-06-09 DIAGNOSIS — J98.01 BRONCHOSPASM: Primary | ICD-10-CM

## 2025-06-09 DIAGNOSIS — R05.2 SUBACUTE COUGH: ICD-10-CM

## 2025-06-09 DIAGNOSIS — J40 BRONCHITIS: ICD-10-CM

## 2025-06-09 PROCEDURE — 99214 OFFICE O/P EST MOD 30 MIN: CPT | Performed by: FAMILY MEDICINE

## 2025-06-09 RX ORDER — PREDNISONE 10 MG/1
TABLET ORAL
Qty: 18 TABLET | Refills: 0 | Status: SHIPPED | OUTPATIENT
Start: 2025-06-09

## 2025-06-09 RX ORDER — AZITHROMYCIN 250 MG/1
TABLET, FILM COATED ORAL
Qty: 6 TABLET | Refills: 0 | Status: SHIPPED | OUTPATIENT
Start: 2025-06-09 | End: 2025-06-14

## 2025-06-09 RX ORDER — ALBUTEROL SULFATE 0.83 MG/ML
2.5 SOLUTION RESPIRATORY (INHALATION) EVERY 6 HOURS PRN
Qty: 1 EACH | Refills: 3 | Status: SHIPPED | OUTPATIENT
Start: 2025-06-09

## 2025-06-09 NOTE — PROGRESS NOTES
HPI:   Sienna Fontenot is a 59 year old female who presents for upper respiratory symptoms for  3  weeks. Patient reports sore throat, congestion, cough with yellow colored sputum. She was here for her preop physical and was getting over a URI, 5/19/25. She was good for a week and then her cough came back , and has been coughing ever since. She' has been wheezing using her nebulizer 3-4 time a day but doesn't seem to be helping has  a productive cough with some blood tinged sputum, no fever.    Current Outpatient Medications   Medication Sig Dispense Refill    predniSONE 10 MG Oral Tab 3 pills for 3 days, 2 pills for 3 days, then one pill daily for 3 days 18 tablet 0    azithromycin (ZITHROMAX Z-CIRO) 250 MG Oral Tab Take 2 tablets (500 mg total) by mouth daily for 1 day, THEN 1 tablet (250 mg total) daily for 4 days. 6 tablet 0    albuterol (2.5 MG/3ML) 0.083% Inhalation Nebu Soln Take 3 mL (2.5 mg total) by nebulization every 6 (six) hours as needed for Wheezing. 1 box please 1 each 3    Pancrelipase, Lip-Prot-Amyl, (ZENPEP) 36675-503264 units Oral Cap DR Particles Take 2 capsules by mouth 3 (three) times daily with meals. May also take 1 capsule 2 (two) times daily as needed (snack). 540 capsule 2    ergocalciferol 1.25 MG (79610 UT) Oral Cap Take 1 capsule (50,000 Units total) by mouth once a week. 12 capsule 1    lidocaine-prilocaine 2.5-2.5 % External Cream Apply topically.      anastrozole 1 MG Oral Tab tab Take 1 tablet (1 mg total) by mouth daily.      Omeprazole 40 MG Oral Capsule Delayed Release Take 1 capsule (40 mg total) by mouth 2 (two) times daily before meals. 180 capsule 1      Past Medical History:    Disorder of thyroid    Fibrocystic breast disease    Fibromyalgia    Obesity, unspecified    PCOS (polycystic ovarian syndrome)    Problems with swallowing    Visual impairment    glasses      Past Surgical History:   Procedure Laterality Date    Colonoscopy N/A 02/24/2022    Procedure:  COLONOSCOPY;  Surgeon: Justus Taylor MD;  Location: [facility] ENDOSCOPY    Cyst aspiration right      Fracture surgery      Jose localization wire 1 site left (cpt=19281)  1979    BENIGN    Needle biopsy left  2010    BENIGN    Oophorectomy Bilateral 2023    Rush Erin    Other      left hand, injury    Other Right 09/10/2019    Right femur rodding     Other surgical history Right 2018    arthroscopy with partial medial mensicectomy    Repair rotator cuff,acute Left     Total abdominal hysterectomy  2023    Rush Erin    Tubal ligation        Family History   Problem Relation Age of Onset    Lipids Mother     Obesity Mother     Heart Disorder Father     Hypertension Father     Breast Cancer Sister 47    Other (sudden death) Brother     Breast Cancer Paternal Aunt 55    Breast Cancer Paternal Aunt 60      Social History     Socioeconomic History    Marital status:    Tobacco Use    Smoking status: Former     Current packs/day: 0.00     Average packs/day: 0.1 packs/day for 3.0 years (0.3 ttl pk-yrs)     Types: Cigarettes     Start date: 1990     Quit date: 1993     Years since quittin.1    Smokeless tobacco: Never   Vaping Use    Vaping status: Never Used   Substance and Sexual Activity    Alcohol use: Yes     Alcohol/week: 0.0 - 2.0 standard drinks of alcohol     Comment: social    Drug use: Never     Comment: rare   Other Topics Concern    Caffeine Concern Yes     Comment: 1-2 cups coffee daily    Exercise Yes     Comment: 4-5 x weekly     Social Drivers of Health     Transportation Needs: Unknown (2025)    Received from [facility]    Transportation Needs     Currently or in the past 3 months, has lack of transportation kept you from medical appointments, getting food or medicine, or providing care to a family member?: Unrecognized value   Housing Stability: Low Risk  (2024)    Received from [facility]    Housing Stability      In the last 12 months, was there a time when you were not able to pay the mortgage or rent on time?: Unrecognized value         REVIEW OF SYSTEMS:   GENERAL: feels well otherwise  SKIN: no rashes  EYES:denies blurred vision or double vision  HEENT: congested  LUNGS:  shortness of breath with exertion, coughing all the time, and wheezing  CARDIOVASCULAR: denies chest pain on exertion  GI: no nausea or abdominal pain  NEURO: denies headaches    EXAM:   /82   Pulse 94   Temp 97 °F (36.1 °C) (Temporal)   Resp 16   Wt 251 lb 6 oz (114 kg)   SpO2 95%   BMI 38.79 kg/m²   GENERAL: well developed, well nourished,in no apparent distress  SKIN: no rashes,no suspicious lesions  EYES:PERRLA, EOMI, normal optic disk,conjunctiva are clear  HEENT: atraumatic, normocephalic,ears and throat are clear  NECK: supple,no adenopathy,no bruits  LUNGS: has crackles in both lung fields, with inspiratory/ expiratory wheezes  CARDIO: RRR without murmur  GI: good BS's,no masses, HSM or tenderness    ASSESSMENT AND PLAN:     Encounter Diagnoses   Name Primary?    Bronchospasm Yes    Bronchitis     Subacute cough        No orders of the defined types were placed in this encounter.      Meds & Refills for this Visit:  Requested Prescriptions     Signed Prescriptions Disp Refills    predniSONE 10 MG Oral Tab 18 tablet 0     Sig: 3 pills for 3 days, 2 pills for 3 days, then one pill daily for 3 days    azithromycin (ZITHROMAX Z-CIRO) 250 MG Oral Tab 6 tablet 0     Sig: Take 2 tablets (500 mg total) by mouth daily for 1 day, THEN 1 tablet (250 mg total) daily for 4 days.    albuterol (2.5 MG/3ML) 0.083% Inhalation Nebu Soln 1 each 3     Sig: Take 3 mL (2.5 mg total) by nebulization every 6 (six) hours as needed for Wheezing. 1 box please

## 2025-06-09 NOTE — TELEPHONE ENCOUNTER
Patient seen in office today    Pharmacy did not receive the following Rx:    albuterol (2.5 MG/3ML) 0.083% Inhalation Nebu Soln     Please adv  Thank you

## 2025-06-23 ENCOUNTER — TELEPHONE (OUTPATIENT)
Dept: FAMILY MEDICINE CLINIC | Facility: CLINIC | Age: 60
End: 2025-06-23

## 2025-06-23 DIAGNOSIS — R05.2 SUBACUTE COUGH: ICD-10-CM

## 2025-06-23 DIAGNOSIS — J40 BRONCHITIS: ICD-10-CM

## 2025-06-23 DIAGNOSIS — J98.01 BRONCHOSPASM: Primary | ICD-10-CM

## 2025-06-23 RX ORDER — CODEINE PHOSPHATE AND GUAIFENESIN 10; 100 MG/5ML; MG/5ML
5 SOLUTION ORAL EVERY 6 HOURS PRN
Qty: 100 ML | Refills: 0 | Status: SHIPPED | OUTPATIENT
Start: 2025-06-23

## 2025-06-23 NOTE — TELEPHONE ENCOUNTER
Left message for the pt with the instructions from Dr. Catalan- advised to call with update in 4-5 days  Also advised to call if any questions

## 2025-06-23 NOTE — TELEPHONE ENCOUNTER
Spoke with the pt and advised of the recommendations- she v/u    Advised to call with an update in a 4-5 days- she v/u  Explained that the cough syrup can make her sleepy- she v/u

## 2025-06-23 NOTE — TELEPHONE ENCOUNTER
I know this is going to sound odd, but have her try some OTC Omeprazole 20 mg at bedtime it may take a couple of days to help. If she gets some relief but not complete then try taking 2 after maybe 4-5 days and let me know. It may be that she is refluxing at night, and that is causing her cough to be so persistent

## 2025-06-23 NOTE — TELEPHONE ENCOUNTER
Spoke with the pt and she is still coughing  productive- worse at night  Took zyrtec, allergra  and zyxol and nothing is helping    No wheezing- but she is still coughing up mucus- clear    Tried day and nyquill and nothing is drying up    Feeling it in her throat- there is a tickle there.    Son told her that she was grey yesterday when she was coughing- it was so hard

## 2025-06-23 NOTE — TELEPHONE ENCOUNTER
Pt called back and states that she takes omeprazole 40mg  every morning. She has been using the nebulizer as well  She doesn't want any steroids because it has pushed back her surgery until July 31st.    Is there a cough syrup she might try? Or maybe a maintenance inhaler she can try???  Or a [id number] blocker? Pt is at a loss

## 2025-06-23 NOTE — TELEPHONE ENCOUNTER
So I still want her to take a dose at bedtime then of the omeprazole, I can send in a cough medicine for her on

## 2025-07-10 ENCOUNTER — TELEPHONE (OUTPATIENT)
Dept: FAMILY MEDICINE CLINIC | Facility: CLINIC | Age: 60
End: 2025-07-10

## 2025-07-10 DIAGNOSIS — J40 BRONCHITIS: Primary | ICD-10-CM

## 2025-07-10 RX ORDER — INHALER, ASSIST DEVICES
1 SPACER (EA) MISCELLANEOUS DAILY
Qty: 1 EACH | Refills: 0 | Status: SHIPPED | OUTPATIENT
Start: 2025-07-10

## 2025-07-10 NOTE — TELEPHONE ENCOUNTER
Patient's name and  verified     Spoke to patient and home health nurse. Home health nurse said patient could benefit from an rescue inhaler with a spacer.   Patient was suppose to be sent home with from the hospital with an inhaler with spacer and DuoNeb.     Please Advise

## 2025-07-10 NOTE — TELEPHONE ENCOUNTER
Left message detail message per (HIPPA form) that prescriptions were called into pharmacy. Patient to call back if they have any questions

## 2025-07-10 NOTE — TELEPHONE ENCOUNTER
Patient discharged from hospital 6/27/25  Finished prednisone 7/7/25    Home health nurse there today    Patient was to receive Rx for albuterol and Duo-Nub with spacer    I advised that patient should have albuterol at pharmacy from 6/9/25 refill    She is requesting the Duo-neb with spacer    Neda roth

## 2025-07-14 ENCOUNTER — OFFICE VISIT (OUTPATIENT)
Dept: FAMILY MEDICINE CLINIC | Facility: CLINIC | Age: 60
End: 2025-07-14
Payer: MEDICARE

## 2025-07-14 VITALS
OXYGEN SATURATION: 98 % | WEIGHT: 255.25 LBS | RESPIRATION RATE: 16 BRPM | SYSTOLIC BLOOD PRESSURE: 122 MMHG | BODY MASS INDEX: 39 KG/M2 | HEART RATE: 97 BPM | TEMPERATURE: 97 F | DIASTOLIC BLOOD PRESSURE: 68 MMHG

## 2025-07-14 DIAGNOSIS — Z17.0 MALIGNANT NEOPLASM OF LOWER-OUTER QUADRANT OF LEFT BREAST OF FEMALE, ESTROGEN RECEPTOR POSITIVE (HCC): ICD-10-CM

## 2025-07-14 DIAGNOSIS — J45.41 MODERATE PERSISTENT ASTHMA WITH EXACERBATION (HCC): ICD-10-CM

## 2025-07-14 DIAGNOSIS — C50.512 MALIGNANT NEOPLASM OF LOWER-OUTER QUADRANT OF LEFT BREAST OF FEMALE, ESTROGEN RECEPTOR POSITIVE (HCC): ICD-10-CM

## 2025-07-14 DIAGNOSIS — J96.01 ACUTE HYPOXIC RESPIRATORY FAILURE (HCC): Primary | ICD-10-CM

## 2025-07-14 PROBLEM — R09.02 HYPOXIA: Status: ACTIVE | Noted: 2025-07-14

## 2025-07-14 PROBLEM — R07.9 NONSPECIFIC CHEST PAIN: Status: ACTIVE | Noted: 2025-06-26

## 2025-07-14 PROBLEM — J38.3 PARADOXICAL VOCAL CORD MOTION DISORDER: Status: ACTIVE | Noted: 2025-07-14

## 2025-07-14 PROCEDURE — G0180 MD CERTIFICATION HHA PATIENT: HCPCS | Performed by: FAMILY MEDICINE

## 2025-07-14 PROCEDURE — 99214 OFFICE O/P EST MOD 30 MIN: CPT | Performed by: FAMILY MEDICINE

## 2025-07-14 RX ORDER — FLUTICASONE FUROATE AND VILANTEROL 200; 25 UG/1; UG/1
1 POWDER RESPIRATORY (INHALATION) DAILY
COMMUNITY
Start: 2025-07-14

## 2025-07-14 NOTE — PROGRESS NOTES
Subjective:   Sienna Fontenot is a 59 year old female who presents for hospital follow up.   She was discharged from Inpatient hospital, [facility] to Home   Admit Date: 6/24/25  Discharge Date: 6/29/25  Hospital Discharge Diagnosis: Acute respiratory failure, Hypoxia, Human Rhinovirus     Interactive contact within 2 business days post discharge first initiated on Date: 7/2/25    I accessed [employer] and/or Care Everywhere and personally reviewed the following for the recent hospitalization: provider notes, consults, summaries, labs and other test results and the pertinent findings are documented below.     HPI: Adilia was dealing with a cough that had gotten progressively worse and could not stop, to the point she went to the ER and was admitted, but after a long protracted stay in the ER, once she got admitted she was  then transferred to the ICU due to respiratory failure. She had HRV, she was sent home on combivent,/DUONEB, and restarted on wellbutrin.  She is still coughing and SHORTNESS OF BREATH, is getting CPAP soon.     History/Other:   Current Medications:  Medication Reconciliation:  I am aware of an inpatient discharge within the last 30 days.  The discharge medication list has been reconciled with the patient's current medication list and reviewed by me. See medication list for additions of new medication, and changes to current doses of medications and discontinued medications.  Outpatient Medications Marked as Taking for the 7/14/25 encounter (Office Visit) with Sang Catalan, DO   Medication Sig    buPROPion  MG Oral Tablet 24 Hr Take 1 tablet (150 mg total) by mouth daily.    Melatonin (MELATONIN MAXIMUM STRENGTH) 5 MG Oral Tab Take 1 tablet (5 mg total) by mouth.    fluticasone furoate-vilanterol 200-25 MCG/ACT Inhalation Aerosol Powder, Breath Activated Inhale 1 puff into the lungs daily.    ipratropium-albuterol  MCG/ACT Inhalation Aero Soln Inhale 1 puff into the lungs  4 (four) times daily.    Spacer/Aero-Holding Chambers (AEROCHAMBER HOLDING CHAMBER) Does not apply Device 1 each daily. To be used with MDI    albuterol (2.5 MG/3ML) 0.083% Inhalation Nebu Soln Take 3 mL (2.5 mg total) by nebulization every 6 (six) hours as needed for Wheezing. 1 box please    Pancrelipase, Lip-Prot-Amyl, (ZENPEP) 23985-446601 units Oral Cap DR Particles Take 2 capsules by mouth 3 (three) times daily with meals. May also take 1 capsule 2 (two) times daily as needed (snack).    ergocalciferol 1.25 MG (12404 UT) Oral Cap Take 1 capsule (50,000 Units total) by mouth once a week.    lidocaine-prilocaine 2.5-2.5 % External Cream Apply topically.    anastrozole 1 MG Oral Tab tab Take 1 tablet (1 mg total) by mouth daily.    Omeprazole 40 MG Oral Capsule Delayed Release Take 1 capsule (40 mg total) by mouth 2 (two) times daily before meals.       Review of Systems:  GENERAL: weight stable, energy stable, no sweating  SKIN: denies any unusual skin lesions  EYES: denies blurred vision or double vision  HEENT: denies nasal congestion, sinus pain or ST  LUNGS: denies shortness of breath with exertion  CARDIOVASCULAR: denies chest pain on exertion or palpitations  GI: denies abdominal pain, denies heartburn, denies diarrhea  MUSCULOSKELETAL: denies pain, normal range of motion of extremities  NEURO: denies headaches, denies dizziness, denies weakness  PSYCHE: denies depression or anxiety  HEMATOLOGIC: denies hx of anemia, or bruising, denies bleeding  ENDOCRINE: denies thyroid history  ALL/ASTHMA: denies hx of allergy or asthma    Objective:   No LMP recorded. Patient has had an ablation.  Estimated body mass index is 39.39 kg/m² as calculated from the following:    Height as of 3/29/24: 5' 7.5\" (1.715 m).    Weight as of this encounter: 255 lb 4 oz (115.8 kg).   /68   Pulse 97   Temp 97.4 °F (36.3 °C) (Temporal)   Resp 16   Wt 255 lb 4 oz (115.8 kg)   SpO2 98%   BMI 39.39 kg/m²    GENERAL: well  developed, well nourished, in no apparent distress  SKIN: no rashes, no suspicious lesions  HEENT: atraumatic, normocephalic, ears and throat are clear, voice is forced with laryngitis   EYES: PERRLA, EOMI, conjunctiva are clear  NECK: supple, no adenopathy, no bruits  CHEST: no chest tenderness  LUNGS: clear to auscultation, but breathing short shallow breaths , tachypneic  CARDIO: RRR without murmur  GI: good BS's, no masses, HSM or tenderness  MUSCULOSKELETAL: back is not tender, FROM of the extremities  EXTREMITIES: no cyanosis, clubbing or edema  NEURO: Oriented times three, cranial nerves are intact, motor and sensory are grossly intact    Assessment & Plan:   1. Acute hypoxic respiratory failure (HCC) (Primary)  2. Malignant neoplasm of lower-outer quadrant of left breast of female, estrogen receptor positive (HCC)  3. Moderate persistent asthma with exacerbation (HCC)  USE THE COMBIVENT QID , ALBUTEROL PRN IN BETWEEN, USE CPAP CONE SHE GETS IT NO OTHER CHANGES ON MEDS, NEEDS TO FOLLOW UP WITH PULMONOLOGY CALL IF SX WORSEN OR PERSIST      Return in 4 weeks (on 8/11/2025).

## 2025-07-16 ENCOUNTER — MED REC SCAN ONLY (OUTPATIENT)
Dept: FAMILY MEDICINE CLINIC | Facility: CLINIC | Age: 60
End: 2025-07-16

## 2025-07-16 ENCOUNTER — HOME HEALTH CHARGES (OUTPATIENT)
Dept: FAMILY MEDICINE CLINIC | Facility: CLINIC | Age: 60
End: 2025-07-16

## 2025-07-16 DIAGNOSIS — J00 ACUTE NASOPHARYNGITIS (COMMON COLD): Primary | ICD-10-CM

## 2025-07-17 ENCOUNTER — TELEPHONE (OUTPATIENT)
Dept: FAMILY MEDICINE CLINIC | Facility: CLINIC | Age: 60
End: 2025-07-17

## 2025-07-29 ENCOUNTER — HOME HEALTH CHARGES (OUTPATIENT)
Dept: FAMILY MEDICINE CLINIC | Facility: CLINIC | Age: 60
End: 2025-07-29

## 2025-07-29 DIAGNOSIS — J00 ACUTE NASOPHARYNGITIS (COMMON COLD): Primary | ICD-10-CM

## 2025-08-07 ENCOUNTER — MED REC SCAN ONLY (OUTPATIENT)
Dept: FAMILY MEDICINE CLINIC | Facility: CLINIC | Age: 60
End: 2025-08-07

## 2025-08-19 RX ORDER — ERGOCALCIFEROL 1.25 MG/1
50000 CAPSULE, LIQUID FILLED ORAL WEEKLY
Qty: 12 CAPSULE | Refills: 1 | OUTPATIENT
Start: 2025-08-19

## (undated) DEVICE — REMOVER PREP SOLUTION 4OZ

## (undated) DEVICE — Device: Brand: BOOT LINER, DISPOSABLE

## (undated) DEVICE — [AUGER BUR, ARTHROSCOPIC SHAVER BLADE,  DO NOT RESTERILIZE,  DO NOT USE IF PACKAGE IS DAMAGED,  KEEP DRY,  KEEP AWAY FROM SUNLIGHT]: Brand: FORMULA

## (undated) DEVICE — GLOVE SURG SENSICARE SZ 6-1/2

## (undated) DEVICE — NEEDLE SCORPION AR-13995N

## (undated) DEVICE — 3M™ IOBAN™ 2 ANTIMICROBIAL INCISE DRAPE 6648EZ: Brand: IOBAN™ 2

## (undated) DEVICE — Device: Brand: BALL NOSE GUIDE WIRE

## (undated) DEVICE — SOL LACT RINGERS 3000ML

## (undated) DEVICE — SNARE OPTMZ PLPCTM TRP

## (undated) DEVICE — TUBING CYSTO TUR DUAL

## (undated) DEVICE — PAD POLAR CARE KNEE/SHOULDER

## (undated) DEVICE — SOL  .9 3000ML

## (undated) DEVICE — SYRINGE 10ML LL CONTRL SYRINGE

## (undated) DEVICE — CONMED SCOPE SAVER BITE BLOCK, 20X27 MM: Brand: SCOPE SAVER

## (undated) DEVICE — 6 ML SYRINGE LUER-LOCK TIP: Brand: MONOJECT

## (undated) DEVICE — SUTURE FIBERLINK FBRWR 2 26IN

## (undated) DEVICE — SUPER SPONGES,MEDIUM: Brand: KERLIX

## (undated) DEVICE — NEEDLE SPINAL 22X5 405148

## (undated) DEVICE — BNDG ADH W1INXL3IN NAT FAB N

## (undated) DEVICE — [AGGRESSIVE PLUS CUTTER, ARTHROSCOPIC SHAVER BLADE,  DO NOT RESTERILIZE,  DO NOT USE IF PACKAGE IS DAMAGED,  KEEP DRY,  KEEP AWAY FROM SUNLIGHT]: Brand: FORMULA

## (undated) DEVICE — LINE MNTR ADLT SET O2 INTMD

## (undated) DEVICE — GLOVE SURG SENSICARE SZ 7

## (undated) DEVICE — PAIN TRAY: Brand: MEDLINE INDUSTRIES, INC.

## (undated) DEVICE — 10K/24K ARTHROSCOPY INFLOW TUBE SET: Brand: 10K/24K

## (undated) DEVICE — GLOVE SURG SENSICARE SZ 7-1/2

## (undated) DEVICE — SHEET,DRAPE,70X100,STERILE: Brand: MEDLINE

## (undated) DEVICE — SNARE CAPTIFLEX MICRO-OVL OLY

## (undated) DEVICE — BANDAGE ROLL,100% COTTON, 6 PLY, LARGE: Brand: KERLIX

## (undated) DEVICE — KIT CLEAN ENDOKIT 1.1OZ GOWNX2

## (undated) DEVICE — KENDALL SCD EXPRESS SLEEVES, KNEE LENGTH, MEDIUM: Brand: KENDALL SCD

## (undated) DEVICE — SHOULDER ARTHROSCOPY CDS-LF: Brand: MEDLINE INDUSTRIES, INC.

## (undated) DEVICE — STERILE POLYISOPRENE POWDER-FREE SURGICAL GLOVES: Brand: PROTEXIS

## (undated) DEVICE — PREMIUM WET SKIN PREP TRAY: Brand: MEDLINE INDUSTRIES, INC.

## (undated) DEVICE — LOWER EXTREMITY CDS-LF: Brand: MEDLINE INDUSTRIES, INC.

## (undated) DEVICE — LEUKOPLAST ELASTIC STERILE 25X75MM TAN 100 1"X3": Brand: LEUKOPLAST®

## (undated) DEVICE — STERILE LATEX POWDER-FREE SURGICAL GLOVESWITH NITRILE COATING: Brand: PROTEXIS

## (undated) DEVICE — POLAR CARE CUBE COOLING UNIT

## (undated) DEVICE — PADDING CAST COTTON  4

## (undated) DEVICE — KNEE ARTHROSCOPY CDS: Brand: MEDLINE INDUSTRIES, INC.

## (undated) DEVICE — KIT ENDO ORCAPOD 160/180/190

## (undated) DEVICE — SOL  .9 1000ML BTL

## (undated) DEVICE — FORCEP RADIAL JAW 4

## (undated) DEVICE — PROXIMATE SKIN STAPLERS (35 WIDE) CONTAINS 35 STAINLESS STEEL STAPLES (FIXED HEAD): Brand: PROXIMATE

## (undated) DEVICE — STERILE HOOK LOCK LATEX FREE ELASTIC BANDAGE 6INX5YD: Brand: HOOK LOCK™

## (undated) DEVICE — CANNULA 5.75 CLEAR AR-6560

## (undated) DEVICE — SKIN MARKER DUAL TIP WITH RULER CAP AND LABELS: Brand: DEVON

## (undated) DEVICE — PIN XTRNFX 508MM 3.2MM NTR NL

## (undated) DEVICE — DRESSING AQUACEL AG 3.5 X 10

## (undated) DEVICE — 3M™ STERI-DRAPE™ U-DRAPE 1015: Brand: STERI-DRAPE™

## (undated) DEVICE — ABDOMINAL PAD: Brand: DERMACEA

## (undated) DEVICE — 3 ML SYRINGE LUER-LOCK TIP: Brand: MONOJECT

## (undated) DEVICE — CANNULA PASSPORT AR-6592-08-40

## (undated) DEVICE — SUTURE VICRYL 2-0 CP-1

## (undated) DEVICE — MEDI-VAC NON-CONDUCTIVE SUCTION TUBING 6MM X 1.8M (6FT.) L: Brand: CARDINAL HEALTH

## (undated) DEVICE — ZIMMER® STERILE DISPOSABLE TOURNIQUET CUFF WITH PLC, DUAL PORT, SINGLE BLADDER, 34 IN. (86 CM)

## (undated) DEVICE — SNARE ENDOSCOPIC 10MM ROUND

## (undated) DEVICE — DRILL SRG 152.5MM FR  NTR

## (undated) DEVICE — SHOULDER TRACTION KIT AR-1635

## (undated) DEVICE — VAPR S 90 ELECTRODE 40 MM 90 DEGREES SUCTION WITH INTEGRATED HANDPIECE: Brand: VAPR

## (undated) DEVICE — PIN FX 30.5CM 3MM NTR NL

## (undated) DEVICE — KIT ASCP FX PUSHLOCK LOPRFL

## (undated) DEVICE — 35 ML SYRINGE REGULAR TIP: Brand: MONOJECT

## (undated) DEVICE — OCCLUSIVE GAUZE STRIP OVERWRAP,3% BISMUTH TRIBROMOPHENATE IN PETROLATUM BLEND: Brand: XEROFORM

## (undated) NOTE — ED AVS SNAPSHOT
THE [facility] Emergency Department in [address] [facility]    Phone:  [phone]    Fax:  [phone]           Mrs. Gage Kim   MRN: [id number]    Department:  THE [facility] Emergency Department in [place] of that Physician. IF THERE IS ANY CHANGE OR WORSENING OF YOUR CONDITION, CALL YOUR PRIMARY CARE PHYSICIAN AT ONCE OR RETURN IMMEDIATELY TO THE EMERGENCY DEPARTMENT.     If you have been prescribed any medication(s), please fill your prescription right a

## (undated) NOTE — LETTER
Gem Shaffer MD        I acknowledge that I have been informed of the risk involved by refusing the urine pregnancy on [address] [facility].     I, thereby,

## (undated) NOTE — LETTER
[facility]  Prakash Ild[address]onsom[id number] [facility], [address] [facility]    Consent for Operation    Date: __________________    Time: _______________    1.  I authorize the performance upon [address] [facility] the following operation:    Procedure(s):  ARTHROSCOPIC A procedure has been videotaped, the surgeon will obtain the original videotape. The hospital will not be responsible for storage or maintenance of this tape.     6. For the purpose of advancing medical education, I consent to the admittance of observers to t STATEMENTS REQUIRING INSERTION OR COMPLETION WERE FILLED IN.     Signature of Patient:   ___________________________    When the patient is a minor or mentally incompetent to give consent:  Signature of person authorized to consent for patient: ____________ supplements, and pills I can buy without a prescription (including street drugs/illegal medications). Failure to inform my anesthesiologist about these medicines may increase my risk of anesthetic complications.   · If I am allergic to anything or have had Anesthesiologist Signature     Date   Time  I have discussed the procedure and information above with the patient (or patient’s representative) and answered their questions. The patient or their representative has agreed to have anesthesia services.     ___
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
## (undated) NOTE — Clinical Note
Thank you for referring Sienna to the [facility]. I met with her in consultation today via person. I have ordered lab, referred for a nutrition consultation with our dietician, and counseling services for hx of PTSD. She was started on diethylpropion for medication therapy and will follow-up with me in about 6 weeks.

## (undated) NOTE — ED AVS SNAPSHOT
[facility] NANI Tan    [address] [place] [address]    Phone:  [phone]    Fax:  [phone]           Mrs. Shaniqua Knowles   MRN: [id number]    Department:  THE [facility] at [facility] [address]  [phone] [address] [place] [address], [facility]    [phone]       To Check ER Wait Times:  TEXT 'Connie Teran' to 04697      Click [email or web address]      Or call [phone]    If you have phone number before you leave. After you leave, you should follow the attached instructions. I have read and understand the instructions given to me by my caregivers.         24-Hour Pharmacies        Pharmacy Address Phone Number   [phone] time:  02/01/17 10:30:55    Final result    Impression:    CONCLUSION:  No evidence of acute displaced fracture or dislocation in the left foot. Dictated by: Aurelia Jay MD on 2/01/2017 at 10:30       Approved by:  Aurelia Jay MD If you've recently had a stay at the Hospital you can access your discharge instructions in [employer] by going to Visits < Admission Summaries.  If you've been to the Emergency Department or your doctor's office, you can view your past visit information in My

## (undated) NOTE — Clinical Note
01/19/2017        Media Peru  [address] [facility]      Dear Jordon Sal,    [facility] records indicate that you have outstanding lab work and or testing that was ordered for you and has not yet been completed. To provide you with the best po

## (undated) NOTE — MR AVS SNAPSHOT
[facility], [place] [address] [facility], [address] [place] [address]. [address] [address] [phone]               Thank you for choosing us for your health care visit with Alanis Harris DO.   We are glad to serve you and happy to provide you with th family member will be picking up prescription, office must be given name of individual in advance and they must present an ID as well. ? The name of the person picking up your prescription must be documented in your chart.   Scheduling Tests    If your phy Commonly known as:  VENTOLIN           * Albuterol Sulfate  (90 Base) MCG/ACT Aers   Inhale 2 puffs into the lungs every 6 (six) hours as needed for Wheezing.            HYDROcodone-acetaminophen  MG Tabs   Take 1 tablet by mouth every 8 (eight

## (undated) NOTE — LETTER
07/24/17        Olga Bennett  [address] [facility] [address]      Dear Harmony Garcia,    [facility] [facility] records indicate that you have outstanding lab work and or testing that was ordered for you and has not yet been completed:  Lab Frequency Next Occurrence

## (undated) NOTE — MR AVS SNAPSHOT
[facility], [address], [address] [place] [address] [phone]               Thank you for choosing us for your health care visit with Alyssa Givens DO.   We are glad to serve you and happy to provide you with th ? Patient must present photo ID at time of . If a designated family member will be picking up prescription, office must be given name of individual in advance and they must present an ID as well. ?  The name of the person picking up your prescripti Follow up with MD Thelma Davis 26, [address], [facility] ([facility])    [address] [facility] [address] [facility] [address], [address] [place] [address]            Feb 23, 2017  2:00 PM   Follow up with Catrachita Anguiano Commonly known as:  NAPROSYN           predniSONE 10 MG Tabs   3 pills for 3 days, 2 pills for 3 days, then one pill daily for 3 days   Commonly known as:  DELTASONE           promethazine-codeine 6.25-10 MG/5ML Syrp   Take 5 mL by mouth every 4 (four) ion [email or web address].tn
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
## (undated) NOTE — MR AVS SNAPSHOT
After Visit Summary   9/28/2020    Sharyle Mam    MRN: [id number]           Visit Information     Date & Time  9/28/2020  3:00 PM Provider  Amaya Snow DO [facility], [facility] Dept.  Phone  [phone] CBC WITH DIFFERENTIAL WITH PLATELET [9295402 CUSTOM]     COMP METABOLIC PANEL (14) [6729256 CUSTOM]     HIV AG AB COMBO LAVENDER HOLD [[id number] CUSTOM]     HIV AG AB COMBO [[id number] CUSTOM]     HIV AG AB COMBO [[id number] CUSTOM]     HPV HIGH RISK , THIN PREP To schedule an appointment for your radiology test please call Sonia Krishnamurthy 84 Scheduling   at [phone]. Around September 28, 2020   Imaging:   XR VIDEO SWALLOW ([id number])    Instructions:  To schedule an appointment for your radiology steve Primary Care Providers  Treatment for acute illness   or injury that are   non-life-threatening.   Also available by appointment     Average cost  $70*       [facility]    [facility]

## (undated) NOTE — LETTER
02/22/2022          [address] [address]      Dear Aminata Mack will be happy to know that your COVID 19 test returned NEGATIVE. If you need any further assistance, please feel free to call [phone]. Thank you for letting us care for you.     Best regards,       MD Lorenzo Jiménez  Gastroenterology  [phone]

## (undated) NOTE — LETTER
[facility]  Administration of Anesthesia  1. I, Sienna Trujillo, or _________________________________ acting on her behalf, (Patient) (Dependent/Representative) request to receive anesthesia for my pending procedure/operation/treatment. A physician (anesthesiologist) alone or an anesthesiologist working with a nurse anesthetist may administer my anesthesia. 2. I understand that my anesthesiologist is not an employee or agent of the hospital, but is an independent medical practitioner who has been permitted to use its facilities for the care and treatment of his/her patients. 3. I acknowledge that a physician from [facility] or their designate(s), recommended anesthesia for me using her/his medical judgment. The type(s) of anesthesia I may receive include:                a) General Anesthesia, b) Spinal/Epidural Anesthesia, c) Regional Anesthesia or d) Monitored Anesthesia Care. 4. If my spinal, regional or monitored anesthesia care (local) is not satisfactory for my comfort, or if my medical condition requires, I consent to the administration of general anesthesia. 5. I am aware that the practice of anesthesiology is not an exact science and that some foreseeable risks or consequences may occur. Some common risks/consequences include sore throat and hoarseness, nausea and vomiting, muscle soreness, backache, damage to the mouth/teeth/vocal cords and eye injury. I understand that more rare but serious potential risks of anesthesia include blood pressure changes, drug reactions, cardiac arrest, brain damage, paralysis or death. These risks apply to whether I have general, spinal/epidural, regional or monitored anesthesia care. 6. OBSTETRIC PATIENTS: Specific risks/consequences of spinal/epidural anesthesia may include itching, low blood pressure, difficulty urinating, slowing of the baby's heart rate and headache.  Rare risks include infections, high spinal block, spinal bleeding, seizure, cardiac arrest and death. 7. AWARENESS: I understand that it is possible (but unlikely) to have explicit memory of events from the operating room while under general anesthesia. 8. ELECTROCONVULSIVE THERAPY PATIENTS: This consent serves for all treatments in a single course of therapy. 9. I understand that I must inform my anesthesiologist when I last ate and/or drank to minimize the risk of anesthesia. 10. If I am pregnant, or may pregnant, I understand that elective surgery should be postponed until after the baby is born. Anesthetics cross the placenta and may temporarily anesthetize the baby. Although fetal complications of anesthesia during pregnancy are rare, they may include birth defects, premature labor, brain damage and death. 11. I certify that I informed the anesthesiologist, to the best of my ability, about medication I take including blood thinners, anticoagulants, herbal remedies, narcotics and recreational drugs (e.g. cocaine, marijuana, PCP). Failure to inform my anesthesiologist about these medicines may increase my risk of anesthetic complications. The nature and purpose of my anesthetic management was explained to me. I had the opportunity to ask questions and the answers and information provided meet my satisfaction.   I retain the right to withdraw this consent at any time prior to the administration of said anesthetic.    ___________________________________________________           _____________________________________________________  Patient Signature                                                                                      Witness Signature                ___________________________________________________           _____________________________________________________  Date/Time                                                                                               Responsible person in case of minor/ unconscious pt /Relationship    My signature below affirms that prior to the time of the procedure, I have explained to the patient and/or his/her guardian, the risks and benefits of undergoing anesthesia, as well as any reasonable alternatives.     ___________________________________________________            _____________________________________________________  Physician Signature                            Date/Time  Patient Name: Don العراقي     : 1965     Printed: 2022      Medical Record #: [id number]                              Page 1 of 1    ----------ANESTHESIA CONSENT----------

## (undated) NOTE — LETTER
Patient Name: Pierre Vergara        : 1965       Medical Record #: [id number]    CONSENT FOR PROCEDURES/SEDATION    Date: 9/15/2017       Time: 8:46 AM        1.  I authorize the performance upon Pierre Vergara the following:    Shar

## (undated) NOTE — LETTER
08/15/17      Dwayne El,    Here are instructions regarding your injection with Dr. Harrison Gill.     Parul  PRE-PROCEDURE INSTRUCTIONS FOR OFFICE PROCEDURES    Appointment Date: 9/15/17  Appointment Time: 8:30AM    Location: [facility]      Please contact your insurance carrier to determine what your financial      responsibility will be for the procedure(s).     Cancellation/Rescheduling Appointment:       In the event you need to cancel or reschedule your appointment, you must notify th

## (undated) NOTE — Clinical Note
2017          To Whom it may Concern,          Re: Xavi Molina, : 65           Please note that Ms. Sanjuanita Rivera has been under my care for several years now.  She recently has undergone several life changing events, including a divorce,

## (undated) NOTE — ED AVS SNAPSHOT
THE [facility] Emergency Department in [address] [place] [address]    Phone:  [phone]    Fax:  [phone]           Mrs. Vizcarra Suha   MRN: [id number]    Department:  THE [facility] Emergency Department in [facility] What changed: This medication is already on your medication list.  Do NOT take both doses of the new and old medication. ONLY take the dose prescribed today.             Where to Get Your Medications      You can get these medications from any pharmacy return to your personal doctor) about any new or lasting problems. The primary care or specialist physician will see patients referred from the [facility] Emergency Department. Follow-up care is at the discretion of that Physician.     IF THERE IS ANY - If you have concerns related to behavioral health issues or thoughts of harming yourself, contact [address] [place] [address] at [phone].     - If you don’t have insurance, Josemanuel Rivas has partnered with Patient Vatler Sera If you've recently had a stay at the Hospital you can access your discharge instructions in [employer] by going to Visits < Admission Summaries.  If you've been to the Emergency Department or your doctor's office, you can view your past visit information in My

## (undated) NOTE — LETTER
[address]  [address] [id number]    8/14/2020      Dear  Julio Hallman    In order to provide the highest quality care, MERI Granado uses a sophisticated computer system to track our makenzie

## (undated) NOTE — LETTER
[address], [place]  Authorization for Invasive Procedures  1. I hereby authorize Dr. Jeffry Hathaway , my physician and whomever may be designated as the doctor's assistant, to perform the following operation and/or procedure:  COLONOSCOPY/ ESOPHAGOGASTRODUODENOSCOPY (EGD)   on Sienna Mclaughlin at [facility].    2. My physician has explained to me the nature and purpose of the operation or other procedure, possible alternative methods of treatment, the risks involved and the possibility of complications to me. I understand the probable consequences of declining the recommended procedure and the alternative methods of treatment. I acknowledge that no guarantee has been made as to the result that may be obtained. 3. I recognize that during the course of this operation or other procedure, unforeseen conditions may necessitate additional or different procedures than those listed above. I, therefore, further authorize and request that the above-named physician, his/her physician assistants, or designees perform such procedures as are, in his/her professional opinion, necessary and desirable. If I have a Do Not Attempt Resuscitation (DNAR) order in place, that status will be suspended while in the operating room, procedural suite, and during the recovery period unless otherwise explicitly stated by me (or a person authorized to consent on my behalf). The surgeon or my attending physician will determine when the applicable recovery period ends for purposes of reinstating the DNAR order. 4. Should the need arise during my operation or immediate post-operative period; I also consent to the administration of blood and/or blood products.  Further, I understand that despite careful testing and screening of blood and blood products, I may still be subject to ill effects as a result of recieving a blood transfusion an/or blood producst. The following are some, but not all, of the potential risks that can occur: fever and allergic reactions, hemolytic reactions, transmission of disease such as hepatitis, AIDS, cytomegalovirus (CMV), and flluid overload. In the event that I wish to have autologous transfusions of my own blood, or a directed donor transfusion, I will discuss this with my physician. 5. I consent to the photographing of the operations or procedures to be performed for the purposes of advancing medicine, science, and/or education, provided my identity is not revealed. If the procedure has been videotaped, the physician/surgeon will obtain the original videotape. The [facility] will not be responsible for storage or maintenance of this tape. 6. I consent to the presence of a  or observer as deemed necessary by my physician or his designee. 7. Any tissues or organs removed in the operation or other procedure may be disposed of by and at the discretion of [facility].    8. I understand that the physician and his/her physician assistants may not be employees or agents of [facility], [facility], nor [facility], but are independent medical practitioners who have been permitted to use its facilities for the care and treatment of their patients. 9. Patients having a sterilization procedure: I understand that if the procedure is successful the results will be permanent and it will therefore be impossible for me to inseminate, conceive or bear children. I also understand that the procedure is intended to result in sterility, although the result has not been guaranteed. 10. I CERTIFY THAT I HAVE READ AND FULLY UNDERSTAND THE ABOVE CONSENT TO OPERATION and/or OTHER PROCEDURE. 11. I acknowledge that my physician has explained sedation/analgesia administration to me including the risks and benefits.  I consent to the administration of sedation/analgesia as may be necessary or desirable in the judgment of my physician. Signature of Patient:  ________________________________________________ Date: _________Time: _________    Responsible person in case of minor or unconscious: _____________________________Relationship: ____________     Witness Signature: ____________________________________________ Date: __________ Time: ___________    Statement of Physician  My signature below affirms that prior to the time of the procedure, I have explained to the patient and/or her legal representative, the risks and benefits involved in the proposed treatment and any reasonable alternative to the proposed treatment. I have also explained the risks and benefits involved in the refusal of the proposed treatment and have answered the patient's questions. If I have a significant financial interest in this procedure/surgery, I have disclosed this and had a discussion with my patient.     Signature of Physician:   ________________________________________Date: _________Time:_______ Patient Name: Jayleen Garcia  : 1965   Printed: 2022    Medical Record #: [id number]

## (undated) NOTE — MR AVS SNAPSHOT
[address] [facility] [address] [phone]               Thank you for choosing us for your health care visit with Mallory Moffett DO.   We are glad to serve you and happy to provide you with this sum Hashimoto's disease        Thyroid nodule          Instructions and Information about Your Health     None      Allergies as of May 22, 2017     Terbinafine Hcl                 Today's Vital Signs     BP Pulse Temp Weight          110/72 mmHg 96 98.6 °F ( Healthy Diet and Regular Exercise  [facility] Cue for making healthy food choices  -   Enjoy your food, but eat less. Fully enjoy your food when eating. Don’t eat while distracted and slow down. Avoid over sized portions.    Baljeet Putnam
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
## (undated) NOTE — MR AVS SNAPSHOT
After Visit Summary   5/15/2018    Shaniqua Knowles    MRN: [id number]           Visit Information     Date & Time  5/15/2018 11:00 AM Provider  Reva Fry DO [place] [address], [facility]  Phone  63 CHLAMYDIA/GONOCOCCUS, SOFIA [[id number] CUSTOM]  5/15/2018 (Approximate) 3/32/3767    COMP METABOLIC PANEL (14) [0001549 CUSTOM]  5/15/2018 (Approximate) 5/15/2019    HIV AG AB COMBO [[id number] CUSTOM]  5/15/2018 (Approximate) 5/15/2019    LIPID PANEL [[id number] C [facility]     Treatment for mild  illness or injury that does  not require immediate attention.           Average cost  $70*       VIDEO VISITS  Visit face-to-face with a [address] or [place]  using [employer]

## (undated) NOTE — LETTER
Anuradha Saha MD        I acknowledge that I have been informed of the risk involved by refusing the urine pregnancy on [address] [facility].     I, thereby,

## (undated) NOTE — LETTER
[address] [facility] [address]  [phone]            3/27/2018        Relda Hetal Convey  [address] [place] [address], [facility]      Dear Rubio Kauffman.     To help u

## (undated) NOTE — LETTER
Freida Lopez [address]  T[place], [address] [place] [address]    Consent for Operation  Date: __________________                                Time: _______________    1.  I authorize the performance upon David Emery the following operation:  Pro procedure has been videotaped, the surgeon will obtain the original videotape. The hospital will not be responsible for storage or maintenance of this tape.   7. For the purpose of advancing medical education, I consent to the admittance of observers to the STATEMENTS REQUIRING INSERTION OR COMPLETION WERE FILLED IN.     Signature of Patient:   ___________________________    When the patient is a minor or mentally incompetent to give consent:  Signature of person authorized to consent for patient: ____________ supplements, and pills I can buy without a prescription (including street drugs/illegal medications). Failure to inform my anesthesiologist about these medicines may increase my risk of anesthetic complications. iv.  If I am allergic to anything or have ha Anesthesiologist Signature     Date   Time  I have discussed the procedure and information above with the patient (or patient’s representative) and answered their questions. The patient or their representative has agreed to have anesthesia services.     ___

## (undated) NOTE — MR AVS SNAPSHOT
[facility], [address] [address]               Thank you for choosing us for your health care visit with MAYLIN Corado.   We are glad to serve you and happy to provide you with this ? Please allow the office 48-72 hours to fill the prescription. ? Patient must present photo ID at time of .   If a designated family member will be picking up prescription, office must be given name of individual in advance and they must present a Return if symptoms worsen or fail to improve, for NS.       Your Appointments     Feb 23, 2017  2:00 PM   Follow up with Yazmin Kohler, [address] [facility], [address] [place], [place] [address]Tj ([address])    [address] [facility], [facility] muscle spasm). Commonly known as:  ROBAXIN           naproxen 500 MG Tabs   Take 1 tablet (500 mg total) by mouth Q12H.  Take with a meal   Commonly known as:  NAPROSYN           predniSONE 10 MG Tabs   3 pills for 3 days, 2 pills for 3 days, then one pil

## (undated) NOTE — LETTER
09/09/19        Frantz Degree Convey  [address] [address]      Dear John Munroe,    [address] [facility] records indicate that you have outstanding lab work and or testing that was ordered for you and has not yet been completed:  Lab Frequency Next Occurre

## (undated) NOTE — LETTER
[address] [facility], [address] [place] [address], [address] [place] [phone]          December 13, 2017    Patient: Vinay Mendoza  YOB: 1965  Member ID: [id number]  Reference #[id number]

## (undated) NOTE — ED AVS SNAPSHOT
[facility] NANI Medeiros November [address] [facility] [address]    Phone:  [phone]    Fax:  [phone]           Mrs. Grant Paul   MRN: [id number]    [facility] Insurance plans vary and the physician(s) referred by the Immediate Care may not be covered by your plan. Please contact your insurance company to determine coverage for follow-up care and referrals. [facility]  [address] If you have been prescribed any medication(s), please fill your prescription right away and begin taking the medication(s) as directed.     If the Immediate Care Provider has read X-rays, these will be re-interpreted by a radiologist.  If there is a signifi can help with your Affordable Care Act coverage, as well as all types of Medicaid plans. To get signed up and covered, please call [phone] and ask to get set up for an insurance coverage that is in-network with Josemanuel Rivas.         Imagin